# Patient Record
Sex: MALE | Race: WHITE | Employment: OTHER | URBAN - METROPOLITAN AREA
[De-identification: names, ages, dates, MRNs, and addresses within clinical notes are randomized per-mention and may not be internally consistent; named-entity substitution may affect disease eponyms.]

---

## 2017-01-09 RX ORDER — MELOXICAM 15 MG/1
TABLET ORAL
Qty: 90 TABLET | Refills: 0 | Status: SHIPPED | OUTPATIENT
Start: 2017-01-09 | End: 2017-04-06 | Stop reason: SDUPTHER

## 2017-02-24 ENCOUNTER — HOSPITAL ENCOUNTER (OUTPATIENT)
Dept: GENERAL RADIOLOGY | Age: 72
Discharge: HOME | End: 2017-02-24
Attending: NURSE PRACTITIONER | Admitting: NURSE PRACTITIONER

## 2017-02-24 ENCOUNTER — OFFICE VISIT (OUTPATIENT)
Dept: INTERNAL MEDICINE CLINIC | Age: 72
End: 2017-02-24

## 2017-02-24 VITALS
WEIGHT: 251 LBS | DIASTOLIC BLOOD PRESSURE: 68 MMHG | HEART RATE: 79 BPM | BODY MASS INDEX: 35.93 KG/M2 | SYSTOLIC BLOOD PRESSURE: 140 MMHG | OXYGEN SATURATION: 96 % | TEMPERATURE: 97.9 F | HEIGHT: 70 IN

## 2017-02-24 DIAGNOSIS — Z12.5 SCREENING FOR PROSTATE CANCER: ICD-10-CM

## 2017-02-24 DIAGNOSIS — Z00.00 ROUTINE GENERAL MEDICAL EXAMINATION AT A HEALTH CARE FACILITY: ICD-10-CM

## 2017-02-24 DIAGNOSIS — Z23 NEED FOR SHINGLES VACCINE: ICD-10-CM

## 2017-02-24 DIAGNOSIS — Z00.00 ROUTINE GENERAL MEDICAL EXAMINATION AT A HEALTH CARE FACILITY: Primary | ICD-10-CM

## 2017-02-24 DIAGNOSIS — N40.0 BENIGN PROSTATIC HYPERPLASIA, PRESENCE OF LOWER URINARY TRACT SYMPTOMS UNSPECIFIED, UNSPECIFIED MORPHOLOGY: ICD-10-CM

## 2017-02-24 DIAGNOSIS — Z11.59 NEED FOR HEPATITIS C SCREENING TEST: ICD-10-CM

## 2017-02-24 DIAGNOSIS — N52.9 ERECTILE DYSFUNCTION, UNSPECIFIED ERECTILE DYSFUNCTION TYPE: ICD-10-CM

## 2017-02-24 DIAGNOSIS — R51.9 NONINTRACTABLE HEADACHE, UNSPECIFIED CHRONICITY PATTERN, UNSPECIFIED HEADACHE TYPE: ICD-10-CM

## 2017-02-24 DIAGNOSIS — E78.2 MIXED HYPERLIPIDEMIA: ICD-10-CM

## 2017-02-24 DIAGNOSIS — I25.10 CORONARY ARTERY DISEASE INVOLVING NATIVE HEART, ANGINA PRESENCE UNSPECIFIED, UNSPECIFIED VESSEL OR LESION TYPE: ICD-10-CM

## 2017-02-24 DIAGNOSIS — I10 ESSENTIAL HYPERTENSION: ICD-10-CM

## 2017-02-24 LAB
A/G RATIO: 1.7 (ref 1.1–2.2)
ALBUMIN SERPL-MCNC: 4.3 G/DL (ref 3.4–5)
ALP BLD-CCNC: 42 U/L (ref 40–129)
ALT SERPL-CCNC: 37 U/L (ref 10–40)
ANION GAP SERPL CALCULATED.3IONS-SCNC: 15 MMOL/L (ref 3–16)
AST SERPL-CCNC: 27 U/L (ref 15–37)
BASOPHILS ABSOLUTE: 0 K/UL (ref 0–0.2)
BASOPHILS RELATIVE PERCENT: 0.8 %
BILIRUB SERPL-MCNC: 0.8 MG/DL (ref 0–1)
BUN BLDV-MCNC: 25 MG/DL (ref 7–20)
CALCIUM SERPL-MCNC: 9.2 MG/DL (ref 8.3–10.6)
CHLORIDE BLD-SCNC: 101 MMOL/L (ref 99–110)
CHOLESTEROL, TOTAL: 166 MG/DL (ref 0–199)
CO2: 25 MMOL/L (ref 21–32)
CREAT SERPL-MCNC: 1.2 MG/DL (ref 0.8–1.3)
EOSINOPHILS ABSOLUTE: 0.1 K/UL (ref 0–0.6)
EOSINOPHILS RELATIVE PERCENT: 2.4 %
GFR AFRICAN AMERICAN: >60
GFR NON-AFRICAN AMERICAN: 60
GLOBULIN: 2.6 G/DL
GLUCOSE BLD-MCNC: 88 MG/DL (ref 70–99)
HCT VFR BLD CALC: 46.5 % (ref 40.5–52.5)
HDLC SERPL-MCNC: 33 MG/DL (ref 40–60)
HEMOGLOBIN: 15.6 G/DL (ref 13.5–17.5)
LDL CHOLESTEROL CALCULATED: 97 MG/DL
LYMPHOCYTES ABSOLUTE: 2 K/UL (ref 1–5.1)
LYMPHOCYTES RELATIVE PERCENT: 37.3 %
MCH RBC QN AUTO: 31.5 PG (ref 26–34)
MCHC RBC AUTO-ENTMCNC: 33.6 G/DL (ref 31–36)
MCV RBC AUTO: 93.8 FL (ref 80–100)
MONOCYTES ABSOLUTE: 0.6 K/UL (ref 0–1.3)
MONOCYTES RELATIVE PERCENT: 10.8 %
NEUTROPHILS ABSOLUTE: 2.6 K/UL (ref 1.7–7.7)
NEUTROPHILS RELATIVE PERCENT: 48.7 %
PDW BLD-RTO: 12.3 % (ref 12.4–15.4)
PLATELET # BLD: 179 K/UL (ref 135–450)
PMV BLD AUTO: 8.3 FL (ref 5–10.5)
POTASSIUM SERPL-SCNC: 4.4 MMOL/L (ref 3.5–5.1)
PROSTATE SPECIFIC ANTIGEN: 1.02 NG/ML (ref 0–4)
RBC # BLD: 4.96 M/UL (ref 4.2–5.9)
SODIUM BLD-SCNC: 141 MMOL/L (ref 136–145)
TOTAL PROTEIN: 6.9 G/DL (ref 6.4–8.2)
TRIGL SERPL-MCNC: 181 MG/DL (ref 0–150)
VLDLC SERPL CALC-MCNC: 36 MG/DL
WBC # BLD: 5.4 K/UL (ref 4–11)

## 2017-02-24 PROCEDURE — 99397 PER PM REEVAL EST PAT 65+ YR: CPT | Performed by: NURSE PRACTITIONER

## 2017-02-24 RX ORDER — TADALAFIL 20 MG/1
20 TABLET ORAL PRN
Qty: 8 TABLET | Refills: 3 | Status: SHIPPED | OUTPATIENT
Start: 2017-02-24 | End: 2018-01-03 | Stop reason: CLARIF

## 2017-02-24 ASSESSMENT — ENCOUNTER SYMPTOMS
SORE THROAT: 0
FACIAL SWELLING: 0
COUGH: 0
SINUS PRESSURE: 0
NAUSEA: 0
DIARRHEA: 0
VOMITING: 0

## 2017-02-25 LAB — HEPATITIS C ANTIBODY INTERPRETATION: NORMAL

## 2017-04-06 RX ORDER — MELOXICAM 15 MG/1
TABLET ORAL
Qty: 90 TABLET | Refills: 0 | Status: SHIPPED | OUTPATIENT
Start: 2017-04-06 | End: 2017-10-03 | Stop reason: SDUPTHER

## 2017-04-06 RX ORDER — AMLODIPINE BESYLATE 5 MG/1
TABLET ORAL
Qty: 90 TABLET | Refills: 3 | Status: SHIPPED | OUTPATIENT
Start: 2017-04-06 | End: 2018-10-01 | Stop reason: CLARIF

## 2017-10-03 RX ORDER — MELOXICAM 15 MG/1
TABLET ORAL
Qty: 90 TABLET | Refills: 0 | Status: SHIPPED | OUTPATIENT
Start: 2017-10-03 | End: 2017-12-28 | Stop reason: SDUPTHER

## 2017-10-03 NOTE — TELEPHONE ENCOUNTER
Refill request for      -                 Meloxicam 15 MG            medication.      Name of Pharmacy-                     [facility]  # [id number]    Last visit - 02/24/17     Pending visit - 02/23/18    Last refill -04/06/17

## 2017-11-02 ENCOUNTER — HOSPITAL ENCOUNTER (OUTPATIENT)
Dept: OTHER | Age: 72
Discharge: HOME | End: 2017-11-02
Attending: RADIOLOGY | Admitting: RADIOLOGY

## 2017-11-02 DIAGNOSIS — S05.50XS: ICD-10-CM

## 2017-12-28 RX ORDER — MELOXICAM 15 MG/1
15 TABLET ORAL DAILY
Qty: 90 TABLET | Refills: 0 | Status: SHIPPED | OUTPATIENT
Start: 2017-12-28 | End: 2018-01-12 | Stop reason: SDUPTHER

## 2017-12-28 NOTE — TELEPHONE ENCOUNTER
Refill request for meloxicam medication.      Name of Pharmacy- [facility]    Last visit - 2/24/17     Pending visit - 1/3/18    Last refill -10/3/17  0 refills

## 2018-01-03 ENCOUNTER — OFFICE VISIT (OUTPATIENT)
Dept: INTERNAL MEDICINE CLINIC | Age: 73
End: 2018-01-03

## 2018-01-03 ENCOUNTER — HOSPITAL ENCOUNTER (OUTPATIENT)
Dept: GENERAL RADIOLOGY | Age: 73
Discharge: HOME | End: 2018-01-03
Attending: NURSE PRACTITIONER | Admitting: NURSE PRACTITIONER

## 2018-01-03 VITALS
SYSTOLIC BLOOD PRESSURE: 136 MMHG | BODY MASS INDEX: 35.98 KG/M2 | OXYGEN SATURATION: 98 % | DIASTOLIC BLOOD PRESSURE: 80 MMHG | WEIGHT: 257 LBS | HEIGHT: 71 IN | HEART RATE: 82 BPM

## 2018-01-03 DIAGNOSIS — I10 ESSENTIAL HYPERTENSION: ICD-10-CM
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
## 2018-01-03 DIAGNOSIS — I25.10 CORONARY ARTERY DISEASE INVOLVING NATIVE HEART, ANGINA PRESENCE UNSPECIFIED, UNSPECIFIED VESSEL OR LESION TYPE: ICD-10-CM

## 2018-01-03 DIAGNOSIS — Z01.818 PRE-OP EXAMINATION: ICD-10-CM

## 2018-01-03 DIAGNOSIS — H26.9 CATARACT OF RIGHT EYE, UNSPECIFIED CATARACT TYPE: Primary | ICD-10-CM

## 2018-01-03 DIAGNOSIS — E78.2 MIXED HYPERLIPIDEMIA: ICD-10-CM

## 2018-01-03 DIAGNOSIS — R35.1 NOCTURIA: ICD-10-CM

## 2018-01-03 LAB
A/G RATIO: 1.8 (ref 1.1–2.2)
ALBUMIN SERPL-MCNC: 4.4 G/DL (ref 3.4–5)
ALP BLD-CCNC: 41 U/L (ref 40–129)
ALT SERPL-CCNC: 39 U/L (ref 10–40)
ANION GAP SERPL CALCULATED.3IONS-SCNC: 15 MMOL/L (ref 3–16)
AST SERPL-CCNC: 26 U/L (ref 15–37)
BASOPHILS ABSOLUTE: 0 K/UL (ref 0–0.2)
BASOPHILS RELATIVE PERCENT: 0.5 %
BILIRUB SERPL-MCNC: 0.6 MG/DL (ref 0–1)
BUN BLDV-MCNC: 28 MG/DL (ref 7–20)
CALCIUM SERPL-MCNC: 9.1 MG/DL (ref 8.3–10.6)
CHLORIDE BLD-SCNC: 103 MMOL/L (ref 99–110)
CHOLESTEROL, TOTAL: 159 MG/DL (ref 0–199)
CO2: 26 MMOL/L (ref 21–32)
CREAT SERPL-MCNC: 1.2 MG/DL (ref 0.8–1.3)
EOSINOPHILS ABSOLUTE: 0.1 K/UL (ref 0–0.6)
EOSINOPHILS RELATIVE PERCENT: 2.1 %
GFR AFRICAN AMERICAN: >60
GFR NON-AFRICAN AMERICAN: 60
GLOBULIN: 2.5 G/DL
GLUCOSE BLD-MCNC: 105 MG/DL (ref 70–99)
HCT VFR BLD CALC: 45.9 % (ref 40.5–52.5)
HDLC SERPL-MCNC: 32 MG/DL (ref 40–60)
HEMOGLOBIN: 15.4 G/DL (ref 13.5–17.5)
LDL CHOLESTEROL CALCULATED: 94 MG/DL
LYMPHOCYTES ABSOLUTE: 1.6 K/UL (ref 1–5.1)
LYMPHOCYTES RELATIVE PERCENT: 24 %
MCH RBC QN AUTO: 31.7 PG (ref 26–34)
MCHC RBC AUTO-ENTMCNC: 33.6 G/DL (ref 31–36)
MCV RBC AUTO: 94.3 FL (ref 80–100)
MONOCYTES ABSOLUTE: 0.7 K/UL (ref 0–1.3)
MONOCYTES RELATIVE PERCENT: 10.7 %
NEUTROPHILS ABSOLUTE: 4.2 K/UL (ref 1.7–7.7)
NEUTROPHILS RELATIVE PERCENT: 62.7 %
PDW BLD-RTO: 12.4 % (ref 12.4–15.4)
PLATELET # BLD: 188 K/UL (ref 135–450)
PMV BLD AUTO: 8.4 FL (ref 5–10.5)
POTASSIUM SERPL-SCNC: 4.5 MMOL/L (ref 3.5–5.1)
PROSTATE SPECIFIC ANTIGEN: 1.1 NG/ML (ref 0–4)
RBC # BLD: 4.87 M/UL (ref 4.2–5.9)
SODIUM BLD-SCNC: 144 MMOL/L (ref 136–145)
TOTAL PROTEIN: 6.9 G/DL (ref 6.4–8.2)
TRIGL SERPL-MCNC: 165 MG/DL (ref 0–150)
VLDLC SERPL CALC-MCNC: 33 MG/DL
WBC # BLD: 6.8 K/UL (ref 4–11)

## 2018-01-03 PROCEDURE — 1123F ACP DISCUSS/DSCN MKR DOCD: CPT | Performed by: NURSE PRACTITIONER

## 2018-01-03 PROCEDURE — 4040F PNEUMOC VAC/ADMIN/RCVD: CPT | Performed by: NURSE PRACTITIONER

## 2018-01-03 PROCEDURE — G8427 DOCREV CUR MEDS BY ELIG CLIN: HCPCS | Performed by: NURSE PRACTITIONER

## 2018-01-03 PROCEDURE — 3017F COLORECTAL CA SCREEN DOC REV: CPT | Performed by: NURSE PRACTITIONER

## 2018-01-03 PROCEDURE — G8417 CALC BMI ABV UP PARAM F/U: HCPCS | Performed by: NURSE PRACTITIONER

## 2018-01-03 PROCEDURE — 99214 OFFICE O/P EST MOD 30 MIN: CPT | Performed by: NURSE PRACTITIONER

## 2018-01-03 PROCEDURE — G8598 ASA/ANTIPLAT THER USED: HCPCS | Performed by: NURSE PRACTITIONER

## 2018-01-03 PROCEDURE — 1036F TOBACCO NON-USER: CPT | Performed by: NURSE PRACTITIONER

## 2018-01-03 PROCEDURE — G8484 FLU IMMUNIZE NO ADMIN: HCPCS | Performed by: NURSE PRACTITIONER

## 2018-01-03 RX ORDER — DOXYCYCLINE HYCLATE 100 MG
100 TABLET ORAL 2 TIMES DAILY
Qty: 20 TABLET | Refills: 0 | Status: SHIPPED | OUTPATIENT
Start: 2018-01-03 | End: 2018-01-13

## 2018-01-03 NOTE — PROGRESS NOTES
Bleeding risk: No recent or remote history of abnormal bleeding  Personal or FH of DVT/PE: No    Patient objection to receiving blood products: No    Patient Active Problem List   Diagnosis    CAD (coronary artery disease)    Hypertension    Hyperlipidemia    Melanoma ([address]    S/P TKR (total knee replacement)       Past Medical History:   Diagnosis Date    CAD (coronary artery disease)     Hearing loss     right    Hyperlipidemia     Hypertension     Melanoma ([address]     S/P coronary artery stent placement 2004     Past Surgical History:   Procedure Laterality Date    COLONOSCOPY  01/2008    neg    COLONOSCOPY  2003    COLONOSCOPY  5/21/2014    diverticulosis-repeat 5 years    CYST REMOVAL Left 1976    foot    JOINT REPLACEMENT      MOHS SURGERY  2006    Melanoma neck    PTCA      2004    TONSILLECTOMY      TONSILLECTOMY AND ADENOIDECTOMY  1951    TOTAL KNEE ARTHROPLASTY Left 10/14    [facility]    WRIST GANGLION EXCISION Right 1979     Family History   Problem Relation Age of Onset    Heart Disease Mother 76    Heart Attack Mother     Heart Disease Father 55    Heart Attack Father     Cancer Brother 35     colon    Crohn's Disease Brother     Heart Disease Paternal Grandfather 39     Social History     Social History    Marital status:      Spouse name: N/A    Number of children: N/A    Years of education: N/A     Occupational History    Not on file. Social History Main Topics    Smoking status: Former Smoker     Packs/day: 1.00     Years: 15.00     Quit date: 1/1/1967    Smokeless tobacco: Never Used    Alcohol use No    Drug use: No    Sexual activity: Yes     Partners: Female     Other Topics Concern    Not on file     Social History Narrative    No narrative on file       Review of Systems  All other systems were reviewed and are negative. Physical Exam   Constitutional: He is oriented to person, place, and time.  He appears well-developed and well-nourished. No distress. Overweight. HENT:   Head: Normocephalic and atraumatic. Mouth/Throat: Uvula is midline, oropharynx is clear and moist and mucous membranes are normal. Crowns two upper front teeth. Eyes: Conjunctivae and EOM are normal. Pupils are equal, round, and reactive to light. Corrective lenses. Neck: Trachea normal and normal range of motion. Neck supple. No JVD present. Carotid bruit is not present. No mass and no thyromegaly present. Cardiovascular: Normal rate, regular rhythm, normal heart sounds and intact distal pulses. Exam reveals no gallop and no friction rub. No murmur heard. Pulmonary/Chest: Effort normal and breath sounds normal. No respiratory distress. He has no wheezes. He has no rales. Abdominal: Soft. Normal aorta and bowel sounds are normal. He exhibits no distension and no mass. There is no hepatosplenomegaly. No tenderness. Musculoskeletal: He exhibits no edema and no tenderness. Neurological: He is alert and oriented to person, place, and time. He has normal strength. No cranial nerve deficit or sensory deficit. Coordination and gait normal.   Skin: Skin is warm and dry. No rash noted. No erythema. Rt thumb with 1 cm laceration at base of thumb, mild surrounding erythema. No active drainage or warmth. Psychiatric: He has a normal mood and affect. His behavior is normal.     EKG Interpretation:  Cardiac clearance. Lab Review not applicable        Assessment:       81 y.o. patient with planned surgery as above. Known risk factors for perioperative complications: Coronary artery disease, Hypertension  Current medications which may produce withdrawal symptoms if withheld perioperatively: none     1. Cataract of right eye, unspecified cataract type  Cleared for surgery pending CC    2. Pre-op examination  Cleared for surgery pending CC    3. Essential hypertension  Monitor, check labs for upcoming appt  - Comprehensive Metabolic Panel;  Future  - CBC Auto Admission

## 2018-01-12 RX ORDER — MELOXICAM 15 MG/1
TABLET ORAL
Qty: 90 TABLET | Refills: 1 | Status: SHIPPED | OUTPATIENT
Start: 2018-01-12 | End: 2018-02-27 | Stop reason: SDUPTHER

## 2018-01-12 NOTE — TELEPHONE ENCOUNTER
Refill request for meloxicam medication.      Name of Pharmacy- [facility]    Last visit - 1-3-2018     Pending visit - 2-    Last refill -12-    Medication Contract signed -   Rizwan rosen-     Additional Comments

## 2018-02-23 ENCOUNTER — OFFICE VISIT (OUTPATIENT)
Dept: INTERNAL MEDICINE CLINIC | Age: 73
End: 2018-02-23

## 2018-02-23 VITALS
BODY MASS INDEX: 36.12 KG/M2 | HEART RATE: 74 BPM | WEIGHT: 258 LBS | SYSTOLIC BLOOD PRESSURE: 142 MMHG | DIASTOLIC BLOOD PRESSURE: 80 MMHG | HEIGHT: 71 IN | OXYGEN SATURATION: 96 %

## 2018-02-23 DIAGNOSIS — E78.2 MIXED HYPERLIPIDEMIA: ICD-10-CM

## 2018-02-23 DIAGNOSIS — Z13.6 SCREENING FOR AAA (ABDOMINAL AORTIC ANEURYSM): ICD-10-CM

## 2018-02-23 DIAGNOSIS — Z00.00 ROUTINE GENERAL MEDICAL EXAMINATION AT A HEALTH CARE FACILITY: Primary | ICD-10-CM

## 2018-02-23 DIAGNOSIS — R73.01 IFG (IMPAIRED FASTING GLUCOSE): ICD-10-CM

## 2018-02-23 DIAGNOSIS — M54.5 CHRONIC LOW BACK PAIN, UNSPECIFIED BACK PAIN LATERALITY, WITH SCIATICA PRESENCE UNSPECIFIED: ICD-10-CM

## 2018-02-23 DIAGNOSIS — R05.9 COUGH: ICD-10-CM

## 2018-02-23 DIAGNOSIS — H91.93 BILATERAL HEARING LOSS, UNSPECIFIED HEARING LOSS TYPE: ICD-10-CM

## 2018-02-23 DIAGNOSIS — K21.9 GASTROESOPHAGEAL REFLUX DISEASE, ESOPHAGITIS PRESENCE NOT SPECIFIED: ICD-10-CM

## 2018-02-23 DIAGNOSIS — I10 ESSENTIAL HYPERTENSION: ICD-10-CM

## 2018-02-23 DIAGNOSIS — G89.29 CHRONIC LOW BACK PAIN, UNSPECIFIED BACK PAIN LATERALITY, WITH SCIATICA PRESENCE UNSPECIFIED: ICD-10-CM

## 2018-02-23 DIAGNOSIS — Z77.29 EXPOSURE TO HAZARDOUS MATERIAL: ICD-10-CM

## 2018-02-23 PROCEDURE — G8598 ASA/ANTIPLAT THER USED: HCPCS | Performed by: NURSE PRACTITIONER

## 2018-02-23 PROCEDURE — 1036F TOBACCO NON-USER: CPT | Performed by: NURSE PRACTITIONER

## 2018-02-23 PROCEDURE — 4040F PNEUMOC VAC/ADMIN/RCVD: CPT | Performed by: NURSE PRACTITIONER

## 2018-02-23 PROCEDURE — G0402 INITIAL PREVENTIVE EXAM: HCPCS | Performed by: NURSE PRACTITIONER

## 2018-02-23 PROCEDURE — G8484 FLU IMMUNIZE NO ADMIN: HCPCS | Performed by: NURSE PRACTITIONER

## 2018-02-23 PROCEDURE — 1123F ACP DISCUSS/DSCN MKR DOCD: CPT | Performed by: NURSE PRACTITIONER

## 2018-02-23 PROCEDURE — 3017F COLORECTAL CA SCREEN DOC REV: CPT | Performed by: NURSE PRACTITIONER

## 2018-02-23 PROCEDURE — 99213 OFFICE O/P EST LOW 20 MIN: CPT | Performed by: NURSE PRACTITIONER

## 2018-02-23 PROCEDURE — G8417 CALC BMI ABV UP PARAM F/U: HCPCS | Performed by: NURSE PRACTITIONER

## 2018-02-23 PROCEDURE — G8427 DOCREV CUR MEDS BY ELIG CLIN: HCPCS | Performed by: NURSE PRACTITIONER

## 2018-02-23 ASSESSMENT — PATIENT HEALTH QUESTIONNAIRE - PHQ9: SUM OF ALL RESPONSES TO PHQ QUESTIONS 1-9: 0

## 2018-02-24 ENCOUNTER — HOSPITAL ENCOUNTER (OUTPATIENT)
Dept: OTHER | Age: 73
Discharge: HOME | End: 2018-02-24
Attending: NURSE PRACTITIONER | Admitting: NURSE PRACTITIONER

## 2018-02-24 DIAGNOSIS — R05.9 COUGH: ICD-10-CM

## 2018-02-24 DIAGNOSIS — Z77.29 EXPOSURE TO HAZARDOUS MATERIAL: ICD-10-CM

## 2018-02-27 RX ORDER — MELOXICAM 15 MG/1
TABLET ORAL
Qty: 90 TABLET | Refills: 1
Start: 2018-02-27 | End: 2018-07-13

## 2018-02-27 ASSESSMENT — ENCOUNTER SYMPTOMS
BACK PAIN: 1
SORE THROAT: 0
FACIAL SWELLING: 0
SINUS PRESSURE: 0
NAUSEA: 0
DIARRHEA: 0
VOMITING: 0
COUGH: 0

## 2018-02-27 NOTE — PROGRESS NOTES
Disease Father 55    Heart Attack Father     Cancer Brother 35     colon    Crohn's Disease Brother     Heart Disease Paternal Grandfather 39       Review of Systems   Constitutional: Negative for appetite change, chills, fatigue, fever and unexpected weight change. HENT: Positive for hearing loss. Negative for congestion, ear discharge, ear pain, facial swelling, sinus pressure, sneezing and sore throat. Respiratory: Negative for cough (in AM). Cardiovascular: Negative for chest pain. Gastrointestinal: Negative for diarrhea, nausea and vomiting. Genitourinary: Negative for difficulty urinating, dysuria, hematuria and urgency. Musculoskeletal: Positive for back pain. Negative for arthralgias and gait problem. Neurological: Negative for dizziness, weakness and headaches. Hematological: Negative for adenopathy. Psychiatric/Behavioral: Negative for sleep disturbance and suicidal ideas. Objective:   Physical Exam   Constitutional: He is oriented to person, place, and time. He appears well-developed and well-nourished. No distress. HENT:   Head: Normocephalic and atraumatic. Right Ear: External ear normal.   Left Ear: External ear normal.   Nose: Nose normal.   Mouth/Throat: Oropharynx is clear and moist.   Cardiovascular: Normal rate, regular rhythm, normal heart sounds and intact distal pulses. Pulmonary/Chest: Effort normal and breath sounds normal. He has no wheezes. He has no rales. Musculoskeletal: Normal range of motion. He exhibits no edema. Neurological: He is alert and oriented to person, place, and time. No cranial nerve deficit. Skin: He is not diaphoretic. Psychiatric: He has a normal mood and affect. His behavior is normal. Thought content normal.       Assessment:        2. Gastroesophageal reflux disease, esophagitis presence not specified  Encouraged to use Zantac 300mg OTC. Work on [employer]. 3. Essential hypertension  Monitor    4.  Mixed

## 2018-03-13 ENCOUNTER — HOSPITAL ENCOUNTER (OUTPATIENT)
Dept: ULTRASOUND IMAGING | Age: 73
Discharge: HOME | End: 2018-03-13
Attending: NURSE PRACTITIONER | Admitting: NURSE PRACTITIONER

## 2018-03-13 DIAGNOSIS — Z13.6 SCREENING FOR AAA (ABDOMINAL AORTIC ANEURYSM): ICD-10-CM

## 2018-03-13 DIAGNOSIS — Z13.6 ENCOUNTER FOR SCREENING FOR CARDIOVASCULAR DISORDERS: ICD-10-CM

## 2018-04-25 ENCOUNTER — HOSPITAL ENCOUNTER (OUTPATIENT)
Dept: OTHER | Age: 73
Discharge: HOME | End: 2018-04-25
Attending: NURSE PRACTITIONER | Admitting: NURSE PRACTITIONER

## 2018-04-25 ENCOUNTER — OFFICE VISIT (OUTPATIENT)
Dept: INTERNAL MEDICINE CLINIC | Age: 73
End: 2018-04-25

## 2018-04-25 ENCOUNTER — HOSPITAL ENCOUNTER (OUTPATIENT)
Dept: GENERAL RADIOLOGY | Age: 73
Discharge: HOME | End: 2018-04-25
Attending: NURSE PRACTITIONER | Admitting: NURSE PRACTITIONER

## 2018-04-25 VITALS
BODY MASS INDEX: 37.16 KG/M2 | HEART RATE: 89 BPM | DIASTOLIC BLOOD PRESSURE: 76 MMHG | WEIGHT: 259 LBS | SYSTOLIC BLOOD PRESSURE: 134 MMHG | OXYGEN SATURATION: 98 %

## 2018-04-25 DIAGNOSIS — G89.29 CHRONIC BILATERAL LOW BACK PAIN WITH BILATERAL SCIATICA: Primary | ICD-10-CM

## 2018-04-25 DIAGNOSIS — K21.9 GASTROESOPHAGEAL REFLUX DISEASE, ESOPHAGITIS PRESENCE NOT SPECIFIED: ICD-10-CM

## 2018-04-25 DIAGNOSIS — E78.2 MIXED HYPERLIPIDEMIA: ICD-10-CM

## 2018-04-25 DIAGNOSIS — Z01.812 PRE-OPERATIVE LABORATORY EXAMINATION: ICD-10-CM

## 2018-04-25 DIAGNOSIS — I25.10 CORONARY ARTERY DISEASE INVOLVING NATIVE HEART, ANGINA PRESENCE UNSPECIFIED, UNSPECIFIED VESSEL OR LESION TYPE: ICD-10-CM

## 2018-04-25 DIAGNOSIS — R73.01 IFG (IMPAIRED FASTING GLUCOSE): ICD-10-CM

## 2018-04-25 DIAGNOSIS — Z01.818 PREOP TESTING: ICD-10-CM

## 2018-04-25 DIAGNOSIS — I10 ESSENTIAL HYPERTENSION: ICD-10-CM

## 2018-04-25 DIAGNOSIS — M54.42 CHRONIC BILATERAL LOW BACK PAIN WITH BILATERAL SCIATICA: Primary | ICD-10-CM

## 2018-04-25 DIAGNOSIS — M54.41 CHRONIC BILATERAL LOW BACK PAIN WITH BILATERAL SCIATICA: Primary | ICD-10-CM

## 2018-04-25 LAB
ABO/RH: NORMAL
ANION GAP SERPL CALCULATED.3IONS-SCNC: 15 MMOL/L (ref 3–16)
ANTIBODY SCREEN: NORMAL
BASOPHILS ABSOLUTE: 0 K/UL (ref 0–0.2)
BASOPHILS RELATIVE PERCENT: 0.7 %
BUN BLDV-MCNC: 25 MG/DL (ref 7–20)
CALCIUM SERPL-MCNC: 9.2 MG/DL (ref 8.3–10.6)
CHLORIDE BLD-SCNC: 102 MMOL/L (ref 99–110)
CO2: 24 MMOL/L (ref 21–32)
CREAT SERPL-MCNC: 1.4 MG/DL (ref 0.8–1.3)
EOSINOPHILS ABSOLUTE: 0.1 K/UL (ref 0–0.6)
EOSINOPHILS RELATIVE PERCENT: 2 %
GFR AFRICAN AMERICAN: >60
GFR NON-AFRICAN AMERICAN: 50
GLUCOSE BLD-MCNC: 113 MG/DL (ref 70–99)
HCT VFR BLD CALC: 45.1 % (ref 40.5–52.5)
HEMOGLOBIN: 15.3 G/DL (ref 13.5–17.5)
LYMPHOCYTES ABSOLUTE: 1.9 K/UL (ref 1–5.1)
LYMPHOCYTES RELATIVE PERCENT: 33.7 %
MCH RBC QN AUTO: 32.1 PG (ref 26–34)
MCHC RBC AUTO-ENTMCNC: 33.9 G/DL (ref 31–36)
MCV RBC AUTO: 94.7 FL (ref 80–100)
MONOCYTES ABSOLUTE: 0.7 K/UL (ref 0–1.3)
MONOCYTES RELATIVE PERCENT: 12.3 %
NEUTROPHILS ABSOLUTE: 2.8 K/UL (ref 1.7–7.7)
NEUTROPHILS RELATIVE PERCENT: 51.3 %
PDW BLD-RTO: 12.7 % (ref 12.4–15.4)
PLATELET # BLD: 204 K/UL (ref 135–450)
PMV BLD AUTO: 8.4 FL (ref 5–10.5)
POTASSIUM SERPL-SCNC: 4.6 MMOL/L (ref 3.5–5.1)
RBC # BLD: 4.77 M/UL (ref 4.2–5.9)
SODIUM BLD-SCNC: 141 MMOL/L (ref 136–145)
WBC # BLD: 5.5 K/UL (ref 4–11)

## 2018-04-25 PROCEDURE — 3017F COLORECTAL CA SCREEN DOC REV: CPT | Performed by: NURSE PRACTITIONER

## 2018-04-25 PROCEDURE — 1123F ACP DISCUSS/DSCN MKR DOCD: CPT | Performed by: NURSE PRACTITIONER

## 2018-04-25 PROCEDURE — G8598 ASA/ANTIPLAT THER USED: HCPCS | Performed by: NURSE PRACTITIONER

## 2018-04-25 PROCEDURE — 4040F PNEUMOC VAC/ADMIN/RCVD: CPT | Performed by: NURSE PRACTITIONER

## 2018-04-25 PROCEDURE — 99214 OFFICE O/P EST MOD 30 MIN: CPT | Performed by: NURSE PRACTITIONER

## 2018-04-25 PROCEDURE — 1036F TOBACCO NON-USER: CPT | Performed by: NURSE PRACTITIONER

## 2018-04-25 PROCEDURE — 93000 ELECTROCARDIOGRAM COMPLETE: CPT | Performed by: NURSE PRACTITIONER

## 2018-04-25 PROCEDURE — G8427 DOCREV CUR MEDS BY ELIG CLIN: HCPCS | Performed by: NURSE PRACTITIONER

## 2018-04-25 PROCEDURE — G8417 CALC BMI ABV UP PARAM F/U: HCPCS | Performed by: NURSE PRACTITIONER

## 2018-04-27 PROBLEM — M51.369 LUMBAR DEGENERATIVE DISC DISEASE: Status: ACTIVE | Noted: 2018-04-27

## 2018-04-27 PROBLEM — M43.16 SPONDYLOLISTHESIS AT L4-L5 LEVEL: Status: ACTIVE | Noted: 2018-04-27

## 2018-04-27 PROBLEM — M51.16 LUMBAR DISC HERNIATION WITH RADICULOPATHY: Status: ACTIVE | Noted: 2018-04-27

## 2018-04-27 PROBLEM — M51.36 LUMBAR DEGENERATIVE DISC DISEASE: Status: ACTIVE | Noted: 2018-04-27

## 2018-04-27 PROBLEM — M43.16 SPONDYLOLISTHESIS, LUMBAR REGION: Status: ACTIVE | Noted: 2018-04-27

## 2018-04-27 PROBLEM — M48.062 LUMBAR STENOSIS WITH NEUROGENIC CLAUDICATION: Status: ACTIVE | Noted: 2018-04-27

## 2018-04-27 PROBLEM — E66.9 OBESITY (BMI 30-39.9): Status: ACTIVE | Noted: 2018-04-27

## 2018-04-27 PROBLEM — M54.16 LUMBAR RADICULOPATHY, ACUTE: Status: ACTIVE | Noted: 2018-04-27

## 2018-07-06 ENCOUNTER — OFFICE VISIT (OUTPATIENT)
Dept: INTERNAL MEDICINE CLINIC | Age: 73
End: 2018-07-06

## 2018-07-06 VITALS
SYSTOLIC BLOOD PRESSURE: 132 MMHG | OXYGEN SATURATION: 97 % | DIASTOLIC BLOOD PRESSURE: 78 MMHG | BODY MASS INDEX: 33.58 KG/M2 | HEART RATE: 107 BPM | WEIGHT: 234 LBS

## 2018-07-06 DIAGNOSIS — J02.9 PHARYNGITIS, UNSPECIFIED ETIOLOGY: ICD-10-CM

## 2018-07-06 DIAGNOSIS — M54.41 CHRONIC RIGHT-SIDED LOW BACK PAIN WITH RIGHT-SIDED SCIATICA: Primary | ICD-10-CM

## 2018-07-06 DIAGNOSIS — K21.9 GASTROESOPHAGEAL REFLUX DISEASE, ESOPHAGITIS PRESENCE NOT SPECIFIED: ICD-10-CM

## 2018-07-06 DIAGNOSIS — G89.29 CHRONIC RIGHT-SIDED LOW BACK PAIN WITH RIGHT-SIDED SCIATICA: Primary | ICD-10-CM

## 2018-07-06 PROCEDURE — G8427 DOCREV CUR MEDS BY ELIG CLIN: HCPCS | Performed by: NURSE PRACTITIONER

## 2018-07-06 PROCEDURE — 1123F ACP DISCUSS/DSCN MKR DOCD: CPT | Performed by: NURSE PRACTITIONER

## 2018-07-06 PROCEDURE — 3017F COLORECTAL CA SCREEN DOC REV: CPT | Performed by: NURSE PRACTITIONER

## 2018-07-06 PROCEDURE — 1036F TOBACCO NON-USER: CPT | Performed by: NURSE PRACTITIONER

## 2018-07-06 PROCEDURE — G8417 CALC BMI ABV UP PARAM F/U: HCPCS | Performed by: NURSE PRACTITIONER

## 2018-07-06 PROCEDURE — 4040F PNEUMOC VAC/ADMIN/RCVD: CPT | Performed by: NURSE PRACTITIONER

## 2018-07-06 PROCEDURE — G8598 ASA/ANTIPLAT THER USED: HCPCS | Performed by: NURSE PRACTITIONER

## 2018-07-06 PROCEDURE — 99213 OFFICE O/P EST LOW 20 MIN: CPT | Performed by: NURSE PRACTITIONER

## 2018-07-06 RX ORDER — CLOTRIMAZOLE 10 MG/1
10 LOZENGE ORAL; TOPICAL
COMMUNITY
Start: 2018-07-02 | End: 2018-07-06 | Stop reason: CLARIF

## 2018-07-06 NOTE — PROGRESS NOTES
Social History    Marital status:      Spouse name: N/A    Number of children: N/A    Years of education: N/A     Occupational History    Not on file. Social History Main Topics    Smoking status: Former Smoker     Packs/day: 1.00     Years: 15.00     Quit date: 1/1/1967    Smokeless tobacco: Never Used    Alcohol use No    Drug use: No    Sexual activity: Yes     Partners: Female     Other Topics Concern    Not on file     Social History Narrative    No narrative on file     Family History   Problem Relation Age of Onset    Heart Disease Mother 76    Heart Attack Mother     Heart Disease Father 55    Heart Attack Father     Cancer Brother 35        colon    Crohn's Disease Brother     Heart Disease Paternal Grandfather 45       Review of Systems   Constitutional: Negative for appetite change, chills, fatigue, fever and unexpected weight change. HENT: Positive for ear pain (left) and sore throat. Negative for congestion, ear discharge, facial swelling, hearing loss, sinus pressure and sneezing. Respiratory: Negative for cough. Cardiovascular: Negative for chest pain. Gastrointestinal: Negative for diarrhea, nausea and vomiting. Reflux   Genitourinary: Positive for frequency. Negative for difficulty urinating, dysuria, hematuria and urgency. Musculoskeletal: Negative for arthralgias and gait problem. Neurological: Negative for dizziness, weakness and headaches. Hematological: Negative for adenopathy. Psychiatric/Behavioral: Negative for sleep disturbance and suicidal ideas. Objective:   Physical Exam   Constitutional: He is oriented to person, place, and time. He appears well-developed and well-nourished. No distress. HENT:   Head: Normocephalic and atraumatic.    Right Ear: External ear normal.   Left Ear: External ear normal.   Nose: Nose normal.   Mouth/Throat: Oropharynx is clear and moist.   Eyes: Conjunctivae and EOM are normal. Pupils are equal,

## 2018-07-08 ASSESSMENT — ENCOUNTER SYMPTOMS
DIARRHEA: 0
SINUS PRESSURE: 0
SORE THROAT: 1
COUGH: 0
NAUSEA: 0
VOMITING: 0
FACIAL SWELLING: 0

## 2018-07-09 ENCOUNTER — HOSPITAL ENCOUNTER (OUTPATIENT)
Dept: MRI IMAGING | Age: 73
Discharge: HOME | End: 2018-07-09
Attending: ORTHOPAEDIC SURGERY | Admitting: ORTHOPAEDIC SURGERY

## 2018-07-09 ENCOUNTER — TELEPHONE (OUTPATIENT)
Dept: INTERNAL MEDICINE CLINIC | Age: 73
End: 2018-07-09

## 2018-07-09 DIAGNOSIS — M51.36 LUMBAR DEGENERATIVE DISC DISEASE: Primary | ICD-10-CM

## 2018-07-09 DIAGNOSIS — M48.062 SPINAL STENOSIS OF LUMBAR REGION WITH NEUROGENIC CLAUDICATION: ICD-10-CM

## 2018-07-09 NOTE — TELEPHONE ENCOUNTER
Patient saw Westley Hudson last Friday and he is asking if he can get a RX for a Handicap placard. Please call when ready for him to .   [phone]

## 2018-07-13 ENCOUNTER — TELEPHONE (OUTPATIENT)
Dept: INTERNAL MEDICINE CLINIC | Age: 73
End: 2018-07-13

## 2018-07-13 ENCOUNTER — OFFICE VISIT (OUTPATIENT)
Dept: INTERNAL MEDICINE CLINIC | Age: 73
End: 2018-07-13

## 2018-07-13 VITALS
BODY MASS INDEX: 33.15 KG/M2 | DIASTOLIC BLOOD PRESSURE: 68 MMHG | OXYGEN SATURATION: 97 % | TEMPERATURE: 95.7 F | WEIGHT: 231 LBS | SYSTOLIC BLOOD PRESSURE: 134 MMHG | HEART RATE: 100 BPM

## 2018-07-13 DIAGNOSIS — R60.0 LEG EDEMA, RIGHT: ICD-10-CM

## 2018-07-13 DIAGNOSIS — E78.2 MIXED HYPERLIPIDEMIA: ICD-10-CM

## 2018-07-13 DIAGNOSIS — Z01.818 PRE-OP EXAMINATION: ICD-10-CM

## 2018-07-13 DIAGNOSIS — K21.9 GASTROESOPHAGEAL REFLUX DISEASE, ESOPHAGITIS PRESENCE NOT SPECIFIED: ICD-10-CM

## 2018-07-13 DIAGNOSIS — G89.29 CHRONIC RIGHT-SIDED LOW BACK PAIN WITH RIGHT-SIDED SCIATICA: ICD-10-CM

## 2018-07-13 DIAGNOSIS — I10 ESSENTIAL HYPERTENSION: ICD-10-CM

## 2018-07-13 DIAGNOSIS — M79.604 RIGHT LEG PAIN: ICD-10-CM

## 2018-07-13 DIAGNOSIS — Z98.1 S/P LUMBAR FUSION: ICD-10-CM

## 2018-07-13 DIAGNOSIS — M51.36 LUMBAR DEGENERATIVE DISC DISEASE: Primary | ICD-10-CM

## 2018-07-13 DIAGNOSIS — M54.41 CHRONIC RIGHT-SIDED LOW BACK PAIN WITH RIGHT-SIDED SCIATICA: ICD-10-CM

## 2018-07-13 DIAGNOSIS — I25.10 CORONARY ARTERY DISEASE INVOLVING NATIVE HEART, ANGINA PRESENCE UNSPECIFIED, UNSPECIFIED VESSEL OR LESION TYPE: ICD-10-CM

## 2018-07-13 DIAGNOSIS — R73.01 IFG (IMPAIRED FASTING GLUCOSE): ICD-10-CM

## 2018-07-13 DIAGNOSIS — I82.411 ACUTE DEEP VEIN THROMBOSIS (DVT) OF FEMORAL VEIN OF RIGHT LOWER EXTREMITY (HCC): Primary | ICD-10-CM

## 2018-07-13 PROCEDURE — 99214 OFFICE O/P EST MOD 30 MIN: CPT | Performed by: NURSE PRACTITIONER

## 2018-07-13 PROCEDURE — 4040F PNEUMOC VAC/ADMIN/RCVD: CPT | Performed by: NURSE PRACTITIONER

## 2018-07-13 PROCEDURE — 3017F COLORECTAL CA SCREEN DOC REV: CPT | Performed by: NURSE PRACTITIONER

## 2018-07-13 PROCEDURE — G8598 ASA/ANTIPLAT THER USED: HCPCS | Performed by: NURSE PRACTITIONER

## 2018-07-13 PROCEDURE — 1036F TOBACCO NON-USER: CPT | Performed by: NURSE PRACTITIONER

## 2018-07-13 PROCEDURE — G8417 CALC BMI ABV UP PARAM F/U: HCPCS | Performed by: NURSE PRACTITIONER

## 2018-07-13 PROCEDURE — 1101F PT FALLS ASSESS-DOCD LE1/YR: CPT | Performed by: NURSE PRACTITIONER

## 2018-07-13 PROCEDURE — 1123F ACP DISCUSS/DSCN MKR DOCD: CPT | Performed by: NURSE PRACTITIONER

## 2018-07-13 PROCEDURE — G8427 DOCREV CUR MEDS BY ELIG CLIN: HCPCS | Performed by: NURSE PRACTITIONER

## 2018-07-13 NOTE — TELEPHONE ENCOUNTER
Please put beatriz into July 30th at 11:30AM -- 30 min. He is aware. Made pt aware to stop mobic and asa on eliquis.

## 2018-07-13 NOTE — PROGRESS NOTES
Preoperative Consultation      Denis Negrete  YOB: 1945    Date of Service:  7/13/2018    Vitals:    07/13/18 0731   BP: 134/68   Pulse: 100   Temp: 95.7 °F (35.4 °C)   SpO2: 97%   Weight: 231 lb (104.8 kg)      Wt Readings from Last 2 Encounters:   07/13/18 231 lb (104.8 kg)   07/06/18 234 lb (106.1 kg)     BP Readings from Last 3 Encounters:   07/13/18 134/68   07/06/18 132/78   04/29/18 (!) 143/84        Chief Complaint   Patient presents with   [employer] Pre-op Exam     Allergies   Allergen Reactions    Corticosteroids      Other reaction(s): unable to urinate    Cortisone Nausea Only    Other      STEROIDS    Prednisolone     Sulfa Antibiotics Rash and Other (See Comments)     Unknown, Rash possibly. Outpatient Prescriptions Marked as Taking for the 7/13/18 encounter (Office Visit) with ADIN Deleon CNP   Medication Sig Dispense Refill    aspirin 81 MG tablet Take 1 tablet by mouth daily 30 tablet     meloxicam (MOBIC) 15 MG tablet TAKE 1 TABLET EVERY DAY 90 tablet 1    amLODIPine (NORVASC) 5 MG tablet TAKE 1 TABLET BY MOUTH DAILY. 90 tablet 3    lisinopril (PRINIVIL;ZESTRIL) 20 MG tablet Take 1 tablet by mouth daily 90 tablet 4    ascorbic acid (VITAMIN C) 500 MG tablet Take 500 mg by mouth daily.  doxazosin (CARDURA) 8 MG tablet Take 8 mg by mouth nightly.  atorvastatin (LIPITOR) 20 MG tablet Take 20 mg by mouth daily.  fenofibrate (TRICOR) 145 MG tablet Take 145 mg by mouth daily. This patient presents to the office today for a preoperative consultation at the request of surgeon, Dr. Winsome Brooke, who plans on performing L3-4 and L5-S1 fusion on August 3 at [facility].  The current problem began 1 year ago, and symptoms have been worsening with time. Conservative therapy: Yes: PT, Nerve blocks, which has been ineffective. .    Planned anesthesia: General   Known anesthesia problems: None   Bleeding risk: No recent or remote history status: Former Smoker     Packs/day: 1.00     Years: 15.00     Quit date: 1/1/1967    Smokeless tobacco: Never Used    Alcohol use No    Drug use: No    Sexual activity: Yes     Partners: Female     Other Topics Concern    Not on file     Social History Narrative    No narrative on file       Review of Systems  All other systems were reviewed and are negative. Physical Exam   Constitutional: He is oriented to person, place, and time. He appears well-developed and well-nourished. No distress. HENT:   Head: Normocephalic and atraumatic. Mouth/Throat: Uvula is midline, oropharynx is clear and moist and mucous membranes are normal. Caps/crowns upper anterior. Eyes: Conjunctivae and EOM are normal. Pupils are equal, round, and reactive to light. Neck: Trachea normal and normal range of motion. Neck supple. No JVD present. Carotid bruit is not present. No mass and no thyromegaly present. Cardiovascular: Normal rate, regular rhythm, normal heart sounds and intact distal pulses. Exam reveals no gallop and no friction rub. No murmur heard. Pulmonary/Chest: Effort normal and breath sounds normal. No respiratory distress. He has no wheezes. He has no rales. Abdominal: Soft. Normal aorta and bowel sounds are normal. He exhibits no distension and no mass. There is no hepatosplenomegaly. No tenderness. Musculoskeletal: He exhibits no edema and no tenderness. Neurological: He is alert and oriented to person, place, and time. He has normal strength. No cranial nerve deficit or sensory deficit. Coordination and gait normal.   Skin: Skin is warm and dry. No rash noted. No erythema. Psychiatric: He has a normal mood and affect. His behavior is normal.     EKG Interpretation:  Cardiac clearance by Dr. Kira Carr provided (appt 7/19/18). EKG 4/25/18. Lab Review not applicable        Assessment:       72 y.o. patient with planned surgery as above.     Known risk factors for perioperative complications: Coronary artery disease, Hypertension  Current medications which may produce withdrawal symptoms if withheld perioperatively: none     1. Lumbar degenerative disc disease   SEVERE L45    Cleared pending venous doppler and cardiac clearance. 2. Chronic right-sided low back pain with right-sided sciatica  Cleared pending venous doppler and cardiac clearance    3. Pre-op examination  Cleared pending venous doppler and cardiac clearance    4. Leg edema, right  Order imaging  Elevate RLE     - US DOPPLER VENOUS LEG RIGHT; Future    5. Right leg pain  Order imaging  Elevate RLE  - US DOPPLER VENOUS LEG RIGHT; Future    6. Gastroesophageal reflux disease, esophagitis presence not specified  Monitor    7. Essential hypertension  Monitor    8. Mixed hyperlipidemia  Monitor    9. IFG (impaired fasting glucose)  Monitor    10. Coronary artery disease involving native heart, angina presence unspecified, unspecified vessel or lesion type  Monitor       Plan:     1. Preoperative workup as follows: Cardiac clearance  2. Change in medication regimen before surgery: Take Amlodipine on morning of surgery with sip of water, and hold all other medications until after surgery, Discontinue ASA 7 days before surgery, Discontinue NSAIDs (Mobic, ibuprofen, advil, aleve) 7 days before surgery, Ok to take Tylenol prior to surgery.   3. Prophylaxis for cardiac events with perioperative beta-blockers: Not indicated  ACC/AHA indications for pre-operative beta-blocker use:    · Vascular surgery with history of postitive stress test  · Intermediate or high risk surgery with history of CAD   · Intermediate or high risk surgery with multiple clinical predictors of CAD- 2 of the following: history of compensated or prior heart failure, history of cerebrovascular disease, DM, or renal insufficiency    Routine administration of higher-dose, long-acting metoprolol in beta-blockernaïve patients on the day of surgery, and in the absence of dose

## 2018-07-16 ENCOUNTER — TELEPHONE (OUTPATIENT)
Dept: INTERNAL MEDICINE CLINIC | Age: 73
End: 2018-07-16

## 2018-07-16 NOTE — TELEPHONE ENCOUNTER
Spoke with patient, he is going to pause PT for 1 week and then restart. Please call Dr Jose Bernard office and let them know of patient's DX DVT post-op Rt leg. I am not sure what they want to do regarding upcoming procedure 8/3/18.

## 2018-07-19 ENCOUNTER — TELEPHONE (OUTPATIENT)
Dept: INTERNAL MEDICINE CLINIC | Age: 73
End: 2018-07-19

## 2018-07-20 ENCOUNTER — TELEPHONE (OUTPATIENT)
Dept: INTERNAL MEDICINE CLINIC | Age: 73
End: 2018-07-20

## 2018-07-24 ENCOUNTER — TELEPHONE (OUTPATIENT)
Dept: INTERNAL MEDICINE CLINIC | Age: 73
End: 2018-07-24

## 2018-07-30 ENCOUNTER — OFFICE VISIT (OUTPATIENT)
Dept: INTERNAL MEDICINE CLINIC | Age: 73
End: 2018-07-30

## 2018-07-30 VITALS
BODY MASS INDEX: 32.86 KG/M2 | OXYGEN SATURATION: 97 % | DIASTOLIC BLOOD PRESSURE: 80 MMHG | WEIGHT: 229 LBS | HEART RATE: 114 BPM | SYSTOLIC BLOOD PRESSURE: 126 MMHG

## 2018-07-30 DIAGNOSIS — M51.36 LUMBAR DEGENERATIVE DISC DISEASE: ICD-10-CM

## 2018-07-30 DIAGNOSIS — G89.29 CHRONIC RIGHT-SIDED LOW BACK PAIN WITH RIGHT-SIDED SCIATICA: Primary | ICD-10-CM

## 2018-07-30 DIAGNOSIS — M54.41 CHRONIC RIGHT-SIDED LOW BACK PAIN WITH RIGHT-SIDED SCIATICA: Primary | ICD-10-CM

## 2018-07-30 DIAGNOSIS — I82.491 ACUTE DEEP VEIN THROMBOSIS (DVT) OF OTHER SPECIFIED VEIN OF RIGHT LOWER EXTREMITY (HCC): ICD-10-CM

## 2018-07-30 DIAGNOSIS — Z98.1 S/P LUMBAR FUSION: ICD-10-CM

## 2018-07-30 PROCEDURE — 1101F PT FALLS ASSESS-DOCD LE1/YR: CPT | Performed by: NURSE PRACTITIONER

## 2018-07-30 PROCEDURE — 4040F PNEUMOC VAC/ADMIN/RCVD: CPT | Performed by: NURSE PRACTITIONER

## 2018-07-30 PROCEDURE — G8427 DOCREV CUR MEDS BY ELIG CLIN: HCPCS | Performed by: NURSE PRACTITIONER

## 2018-07-30 PROCEDURE — G8417 CALC BMI ABV UP PARAM F/U: HCPCS | Performed by: NURSE PRACTITIONER

## 2018-07-30 PROCEDURE — 1036F TOBACCO NON-USER: CPT | Performed by: NURSE PRACTITIONER

## 2018-07-30 PROCEDURE — 99214 OFFICE O/P EST MOD 30 MIN: CPT | Performed by: NURSE PRACTITIONER

## 2018-07-30 PROCEDURE — 1123F ACP DISCUSS/DSCN MKR DOCD: CPT | Performed by: NURSE PRACTITIONER

## 2018-07-30 PROCEDURE — G8598 ASA/ANTIPLAT THER USED: HCPCS | Performed by: NURSE PRACTITIONER

## 2018-07-30 PROCEDURE — 3017F COLORECTAL CA SCREEN DOC REV: CPT | Performed by: NURSE PRACTITIONER

## 2018-07-30 ASSESSMENT — ENCOUNTER SYMPTOMS
DIARRHEA: 0
SORE THROAT: 0
BACK PAIN: 1
COUGH: 0
NAUSEA: 0
SINUS PRESSURE: 0
VOMITING: 0
FACIAL SWELLING: 0

## 2018-07-30 NOTE — PROGRESS NOTES
1. Chronic right-sided low back pain with right-sided sciatica  F/u for surgery tomorrow with Dr. Ronal Cruz    2. Lumbar degenerative disc disease  F/u for surgery tomorrow with Dr. Ronal Cruz    3. Acute deep vein thrombosis (DVT) of other specified vein of right lower extremity (HCC)  Monitor closely. Reviewed importance of movement after lumbar fusion tomorrow. Set alarm on phone for every hour. Continue Eliquis post op and f/u with me in 3 months  If any SOB or CP, go to ED    4. S/P lumbar fusion            Plan:      See above plan    Return in about 3 months (around 10/30/2018) for DVT f/u.

## 2018-08-07 DIAGNOSIS — I82.411 ACUTE DEEP VEIN THROMBOSIS (DVT) OF FEMORAL VEIN OF RIGHT LOWER EXTREMITY (HCC): ICD-10-CM

## 2018-08-07 RX ORDER — APIXABAN 5 MG/1
TABLET, FILM COATED ORAL
Qty: 74 TABLET | Refills: 0 | Status: SHIPPED | OUTPATIENT
Start: 2018-08-07 | End: 2018-09-14 | Stop reason: SDUPTHER

## 2018-08-10 ENCOUNTER — OFFICE VISIT (OUTPATIENT)
Dept: INTERNAL MEDICINE CLINIC | Age: 73
End: 2018-08-10

## 2018-08-10 ENCOUNTER — TELEPHONE (OUTPATIENT)
Dept: INTERNAL MEDICINE CLINIC | Age: 73
End: 2018-08-10

## 2018-08-10 ENCOUNTER — HOSPITAL ENCOUNTER (OUTPATIENT)
Age: 73
Discharge: HOME | End: 2018-08-10
Payer: MEDICARE

## 2018-08-10 DIAGNOSIS — I25.10 CORONARY ARTERY DISEASE INVOLVING NATIVE HEART, ANGINA PRESENCE UNSPECIFIED, UNSPECIFIED VESSEL OR LESION TYPE: ICD-10-CM

## 2018-08-10 DIAGNOSIS — R35.89 POLYURIA: ICD-10-CM

## 2018-08-10 DIAGNOSIS — R00.0 TACHYCARDIA: ICD-10-CM

## 2018-08-10 DIAGNOSIS — I95.9 HYPOTENSIVE EPISODE: ICD-10-CM

## 2018-08-10 DIAGNOSIS — R00.0 TACHYCARDIA: Primary | ICD-10-CM

## 2018-08-10 DIAGNOSIS — Z98.1 S/P LUMBAR FUSION: ICD-10-CM

## 2018-08-10 DIAGNOSIS — I82.411 ACUTE DEEP VEIN THROMBOSIS (DVT) OF FEMORAL VEIN OF RIGHT LOWER EXTREMITY (HCC): ICD-10-CM

## 2018-08-10 DIAGNOSIS — R73.9 HYPERGLYCEMIA: ICD-10-CM

## 2018-08-10 LAB
A/G RATIO: 1.6 (ref 1.1–2.2)
ALBUMIN SERPL-MCNC: 4.1 G/DL (ref 3.4–5)
ALP BLD-CCNC: 52 U/L (ref 40–129)
ALT SERPL-CCNC: 36 U/L (ref 10–40)
ANION GAP SERPL CALCULATED.3IONS-SCNC: 17 MMOL/L (ref 3–16)
AST SERPL-CCNC: 22 U/L (ref 15–37)
BACTERIA: ABNORMAL /HPF
BASOPHILS ABSOLUTE: 0.1 K/UL (ref 0–0.2)
BASOPHILS RELATIVE PERCENT: 0.9 %
BILIRUB SERPL-MCNC: 0.3 MG/DL (ref 0–1)
BUN BLDV-MCNC: 27 MG/DL (ref 7–20)
CALCIUM SERPL-MCNC: 9.7 MG/DL (ref 8.3–10.6)
CASTS 2: ABNORMAL /LPF
CASTS: ABNORMAL /LPF
CHLORIDE BLD-SCNC: 99 MMOL/L (ref 99–110)
CO2: 24 MMOL/L (ref 21–32)
CREAT SERPL-MCNC: 1.2 MG/DL (ref 0.8–1.3)
EOSINOPHILS ABSOLUTE: 0.1 K/UL (ref 0–0.6)
EOSINOPHILS RELATIVE PERCENT: 0.9 %
GFR AFRICAN AMERICAN: >60
GFR NON-AFRICAN AMERICAN: 60
GLOBULIN: 2.6 G/DL
GLUCOSE BLD-MCNC: 112 MG/DL (ref 70–99)
GLUCOSE BLD-MCNC: 132 MG/DL
HCT VFR BLD CALC: 34.9 % (ref 40.5–52.5)
HEMOGLOBIN: 11.6 G/DL (ref 13.5–17.5)
LYMPHOCYTES ABSOLUTE: 1.3 K/UL (ref 1–5.1)
LYMPHOCYTES RELATIVE PERCENT: 18.7 %
MCH RBC QN AUTO: 31.3 PG (ref 26–34)
MCHC RBC AUTO-ENTMCNC: 33.3 G/DL (ref 31–36)
MCV RBC AUTO: 93.8 FL (ref 80–100)
MONOCYTES ABSOLUTE: 0.8 K/UL (ref 0–1.3)
MONOCYTES RELATIVE PERCENT: 11.1 %
NEUTROPHILS ABSOLUTE: 4.9 K/UL (ref 1.7–7.7)
NEUTROPHILS RELATIVE PERCENT: 68.4 %
PDW BLD-RTO: 14.2 % (ref 12.4–15.4)
PLATELET # BLD: 262 K/UL (ref 135–450)
PMV BLD AUTO: 7.2 FL (ref 5–10.5)
POTASSIUM SERPL-SCNC: 4.6 MMOL/L (ref 3.5–5.1)
RBC # BLD: 3.72 M/UL (ref 4.2–5.9)
RBC UA: ABNORMAL /HPF (ref 0–2)
RENAL EPITHELIAL, UA: ABNORMAL /HPF
SODIUM BLD-SCNC: 140 MMOL/L (ref 136–145)
TOTAL PROTEIN: 6.7 G/DL (ref 6.4–8.2)
WBC # BLD: 7.1 K/UL (ref 4–11)
WBC UA: ABNORMAL /HPF (ref 0–5)

## 2018-08-10 PROCEDURE — 82962 GLUCOSE BLOOD TEST: CPT | Performed by: NURSE PRACTITIONER

## 2018-08-10 PROCEDURE — 4040F PNEUMOC VAC/ADMIN/RCVD: CPT | Performed by: NURSE PRACTITIONER

## 2018-08-10 PROCEDURE — 83935 ASSAY OF URINE OSMOLALITY: CPT

## 2018-08-10 PROCEDURE — G8598 ASA/ANTIPLAT THER USED: HCPCS | Performed by: NURSE PRACTITIONER

## 2018-08-10 PROCEDURE — 93000 ELECTROCARDIOGRAM COMPLETE: CPT | Performed by: NURSE PRACTITIONER

## 2018-08-10 PROCEDURE — 1036F TOBACCO NON-USER: CPT | Performed by: NURSE PRACTITIONER

## 2018-08-10 PROCEDURE — 81015 MICROSCOPIC EXAM OF URINE: CPT

## 2018-08-10 PROCEDURE — 1101F PT FALLS ASSESS-DOCD LE1/YR: CPT | Performed by: NURSE PRACTITIONER

## 2018-08-10 PROCEDURE — 80053 COMPREHEN METABOLIC PANEL: CPT

## 2018-08-10 PROCEDURE — G8417 CALC BMI ABV UP PARAM F/U: HCPCS | Performed by: NURSE PRACTITIONER

## 2018-08-10 PROCEDURE — 1123F ACP DISCUSS/DSCN MKR DOCD: CPT | Performed by: NURSE PRACTITIONER

## 2018-08-10 PROCEDURE — 3017F COLORECTAL CA SCREEN DOC REV: CPT | Performed by: NURSE PRACTITIONER

## 2018-08-10 PROCEDURE — 36415 COLL VENOUS BLD VENIPUNCTURE: CPT

## 2018-08-10 PROCEDURE — 82088 ASSAY OF ALDOSTERONE: CPT

## 2018-08-10 PROCEDURE — 85025 COMPLETE CBC W/AUTO DIFF WBC: CPT

## 2018-08-10 PROCEDURE — 99215 OFFICE O/P EST HI 40 MIN: CPT | Performed by: NURSE PRACTITIONER

## 2018-08-10 PROCEDURE — G8427 DOCREV CUR MEDS BY ELIG CLIN: HCPCS | Performed by: NURSE PRACTITIONER

## 2018-08-10 NOTE — TELEPHONE ENCOUNTER
Please make Dr Pravin Bashir aware that I saw patient post-fusion and he is hypotensive, tachycardic, polyuria, elevated glucose. I have stopped his Amlodipine and Cardura until I can receive labs back and urinalysis back. (Pt also had IVC filter placed last week and will not have removed for 3 months). Please fax EKG and see if Pravin Bashir would like to me do anything else at this time.

## 2018-08-10 NOTE — PROGRESS NOTES
Subjective:      Patient ID: Derek Rios is a 72 y.o. male. HPI  Chief Complaint   Patient presents with    Urinary Tract Infection     frequency      Patient is here today s/p lumbar fusion revision 7/31/18 by Dr. Elpidio Alfaro. He also was dx DVT, RLE, 7/13/18. Had been on Eliquis until an IVC filter was placed prior to surgery 7/31/18. He still has IVC filter placed. His leg pain has improved since surgery but pt c/o new symptoms. Polyuria every hour x 1-2 months. He has also c/o hypotensive episodes since procedure 7/31/18. Noticed BPs running 100/60s. Feels lightheaded, SOB and fatigued at this time. Then he will rest and symptoms improve. Discontinued Amlodipine 3 days ago. Yesterday when he went to IR consultation regarding filter, he had 105/78 and felt very fatigued again. He states he is only taking tylenol for pain and has not been on opiate. Prior to Visit Medications    Medication Sig Taking? Authorizing Provider   ELIQUIS 5 MG TABS tablet 2 PILLS TWICE A DAY X 1 WEEK 1 PILL TWICE A DAY X 3 WEEKS Yes ADIN Shukla   lisinopril (PRINIVIL;ZESTRIL) 20 MG tablet Take 1 tablet by mouth daily Yes Karyle Garfinkel, MD   ascorbic acid (VITAMIN C) 500 MG tablet Take 500 mg by mouth daily. Yes Historical Provider, MD   doxazosin (CARDURA) 8 MG tablet Take 8 mg by mouth nightly. Yes Historical Provider, MD   atorvastatin (LIPITOR) 20 MG tablet Take 20 mg by mouth daily. Yes Historical Provider, MD   fenofibrate (TRICOR) 145 MG tablet Take 145 mg by mouth daily. Yes Historical Provider, MD   amLODIPine (NORVASC) 5 MG tablet TAKE 1 TABLET BY MOUTH DAILY. ADIN Severino - CNP     Social History     Social History    Marital status:      Spouse name: N/A    Number of children: N/A    Years of education: N/A     Occupational History    Not on file.      Social History Main Topics    Smoking status: Former Smoker     Packs/day: 1.00     Years: 15.00     Quit date: 1/1/1967    Smokeless tobacco: Never Used    Alcohol use No    Drug use: No    Sexual activity: Yes     Partners: Female     Other Topics Concern    Not on file     Social History Narrative    No narrative on file     Family History   Problem Relation Age of Onset    Heart Disease Mother 76    Heart Attack Mother     Heart Disease Father 55    Heart Attack Father     Cancer Brother 35        colon    Crohn's Disease Brother     Heart Disease Paternal Grandfather 39       Review of Systems   Constitutional: Positive for activity change and fatigue. Negative for appetite change, chills, fever and unexpected weight change. HENT: Negative for congestion, ear discharge, ear pain, facial swelling, hearing loss, sinus pressure, sneezing and sore throat. Respiratory: Positive for shortness of breath (on exertion). Negative for cough. Cardiovascular: Positive for leg swelling (RLE improving). Negative for chest pain. Gastrointestinal: Negative for diarrhea, nausea and vomiting. Genitourinary: Positive for frequency. Negative for difficulty urinating, dysuria, hematuria and urgency. Musculoskeletal: Positive for back pain (soreness s/p lumbar fusion revision). Negative for arthralgias and gait problem. Neurological: Negative for dizziness, weakness and headaches. Hematological: Negative for adenopathy. Psychiatric/Behavioral: Negative for sleep disturbance and suicidal ideas. Objective:   Physical Exam   Constitutional: He is oriented to person, place, and time. He appears well-developed and well-nourished. No distress. HENT:   Head: Normocephalic and atraumatic. Right Ear: External ear normal.   Left Ear: External ear normal.   Nose: Nose normal.   Mouth/Throat: Oropharynx is clear and moist.   Eyes: Pupils are equal, round, and reactive to light. Conjunctivae and EOM are normal.   Neck: Normal range of motion. Neck supple.    Cardiovascular: Normal rate, regular rhythm, normal heart sounds and intact distal pulses. Pulmonary/Chest: Effort normal and breath sounds normal. He has no wheezes. He has no rales. Abdominal: Soft. Bowel sounds are normal.   Musculoskeletal:   Lumbar brace. Lymphadenopathy:     He has no cervical adenopathy. Neurological: He is alert and oriented to person, place, and time. No cranial nerve deficit. Skin: He is not diaphoretic. Assessment:      1. Tachycardia  EKG reviewed with Dr Deidra Cobb. No acute changes except for tachycardia. Check labs  Educated pt on monitoring pulse at home. If any irregular rhythm or increase HR > 140, go to ED    - OSMOLALITY, URINE; Future  - Comprehensive Metabolic Panel; Future  - EKG 12 Lead    2. Coronary artery disease involving native heart, angina presence unspecified, unspecified vessel or lesion type  Monitor, EKG tachycardia    - EKG 12 Lead    3. Polyuria  Check labs. Reviewed case with Dr Deidra Cobb and will monitor causes of hyperuresis. Enc pt to monitor hydration and drink > 80 oz water/ day. - POCT Urinalysis no Micro  - OSMOLALITY, URINE; Future  - CBC Auto Differential; Future  - Comprehensive Metabolic Panel; Future  - POCT Glucose  - MICROSCOPIC URINALYSIS; Future    4. Hypotensive episode  Monitor BP at home. Stop Cardura and continue no Amlodipine    - ALDOSTERONE; Future  - CBC Auto Differential; Future  - Comprehensive Metabolic Panel; Future    5. Hyperglycemia  Check A1c.     - Hemoglobin A1C; Future    6. S/P lumbar fusion    7. Acute deep vein thrombosis (DVT) of femoral vein of right lower extremity (HCC)  Continue IVC filter placement and review with Dr Key Pryor when removal will occur          Plan:      See above plan    Total visit time was 50 minutes, of which more than 50% of the time was spent discussing and counseling patient on hypotension, tachycardia, DVT and polyuria. Return if symptoms worsen or fail to improve.             Olman Laureano, APRN - CNP

## 2018-08-11 LAB — OSMOLALITY URINE: 426 MOSM/KG (ref 390–1070)

## 2018-08-12 VITALS
SYSTOLIC BLOOD PRESSURE: 84 MMHG | BODY MASS INDEX: 33.15 KG/M2 | WEIGHT: 231 LBS | OXYGEN SATURATION: 98 % | DIASTOLIC BLOOD PRESSURE: 42 MMHG | HEART RATE: 120 BPM

## 2018-08-12 LAB — ALDOSTERONE: 16.5 NG/DL

## 2018-08-12 ASSESSMENT — ENCOUNTER SYMPTOMS
BACK PAIN: 1
FACIAL SWELLING: 0
VOMITING: 0
SINUS PRESSURE: 0
SHORTNESS OF BREATH: 1
COUGH: 0
DIARRHEA: 0
SORE THROAT: 0
NAUSEA: 0

## 2018-08-15 DIAGNOSIS — I95.9 HYPOTENSIVE EPISODE: Primary | ICD-10-CM

## 2018-08-17 ENCOUNTER — HOSPITAL ENCOUNTER (OUTPATIENT)
Age: 73
Discharge: HOME | End: 2018-08-17
Payer: MEDICARE

## 2018-08-17 ENCOUNTER — TELEPHONE (OUTPATIENT)
Dept: INTERNAL MEDICINE CLINIC | Age: 73
End: 2018-08-17

## 2018-08-17 DIAGNOSIS — R35.0 FREQUENCY OF URINATION: Primary | ICD-10-CM

## 2018-08-17 DIAGNOSIS — R73.9 HYPERGLYCEMIA: ICD-10-CM

## 2018-08-17 DIAGNOSIS — I95.9 HYPOTENSIVE EPISODE: ICD-10-CM

## 2018-08-17 LAB
AMORPHOUS: ABNORMAL /HPF
BACTERIA: ABNORMAL /HPF
BASOPHILS ABSOLUTE: 0 K/UL (ref 0–0.2)
BASOPHILS RELATIVE PERCENT: 0.5 %
BILIRUBIN URINE: NEGATIVE
BLOOD, URINE: ABNORMAL
CLARITY: ABNORMAL
COLOR: YELLOW
EOSINOPHILS ABSOLUTE: 0.1 K/UL (ref 0–0.6)
EOSINOPHILS RELATIVE PERCENT: 1.3 %
GLUCOSE URINE: NEGATIVE MG/DL
HCT VFR BLD CALC: 38.5 % (ref 40.5–52.5)
HEMOGLOBIN: 12.4 G/DL (ref 13.5–17.5)
KETONES, URINE: NEGATIVE MG/DL
LEUKOCYTE ESTERASE, URINE: ABNORMAL
LYMPHOCYTES ABSOLUTE: 2.1 K/UL (ref 1–5.1)
LYMPHOCYTES RELATIVE PERCENT: 32.8 %
MCH RBC QN AUTO: 30.6 PG (ref 26–34)
MCHC RBC AUTO-ENTMCNC: 32.3 G/DL (ref 31–36)
MCV RBC AUTO: 94.6 FL (ref 80–100)
MICROSCOPIC EXAMINATION: YES
MONOCYTES ABSOLUTE: 0.7 K/UL (ref 0–1.3)
MONOCYTES RELATIVE PERCENT: 10.7 %
NEUTROPHILS ABSOLUTE: 3.6 K/UL (ref 1.7–7.7)
NEUTROPHILS RELATIVE PERCENT: 54.7 %
NITRITE, URINE: POSITIVE
PDW BLD-RTO: 14.6 % (ref 12.4–15.4)
PH UA: 6
PLATELET # BLD: 291 K/UL (ref 135–450)
PMV BLD AUTO: 7.2 FL (ref 5–10.5)
PROTEIN UA: NEGATIVE MG/DL
RBC # BLD: 4.06 M/UL (ref 4.2–5.9)
RBC UA: ABNORMAL /HPF (ref 0–2)
SPECIFIC GRAVITY UA: 1.01
URINE TYPE: ABNORMAL
UROBILINOGEN, URINE: 0.2 E.U./DL
WBC # BLD: 6.5 K/UL (ref 4–11)
WBC UA: ABNORMAL /HPF (ref 0–5)

## 2018-08-17 PROCEDURE — 87077 CULTURE AEROBIC IDENTIFY: CPT

## 2018-08-17 PROCEDURE — 87186 SC STD MICRODIL/AGAR DIL: CPT

## 2018-08-17 PROCEDURE — 83036 HEMOGLOBIN GLYCOSYLATED A1C: CPT

## 2018-08-17 PROCEDURE — 87086 URINE CULTURE/COLONY COUNT: CPT

## 2018-08-17 PROCEDURE — 81001 URINALYSIS AUTO W/SCOPE: CPT

## 2018-08-17 PROCEDURE — 85025 COMPLETE CBC W/AUTO DIFF WBC: CPT

## 2018-08-17 PROCEDURE — 36415 COLL VENOUS BLD VENIPUNCTURE: CPT

## 2018-08-17 RX ORDER — CIPROFLOXACIN 500 MG/1
500 TABLET, FILM COATED ORAL 2 TIMES DAILY
Qty: 10 TABLET | Refills: 0 | Status: SHIPPED | OUTPATIENT
Start: 2018-08-17 | End: 2018-08-22

## 2018-08-18 LAB
ESTIMATED AVERAGE GLUCOSE: 105.4 MG/DL
HBA1C MFR BLD: 5.3 %

## 2018-08-19 LAB
ORGANISM: ABNORMAL
URINE CULTURE, ROUTINE: ABNORMAL
URINE CULTURE, ROUTINE: ABNORMAL

## 2018-08-20 ENCOUNTER — TELEPHONE (OUTPATIENT)
Dept: INTERNAL MEDICINE CLINIC | Age: 73
End: 2018-08-20

## 2018-09-14 DIAGNOSIS — I82.411 ACUTE DEEP VEIN THROMBOSIS (DVT) OF FEMORAL VEIN OF RIGHT LOWER EXTREMITY (HCC): ICD-10-CM

## 2018-09-14 NOTE — TELEPHONE ENCOUNTER
Refill request for  Eliquis  medication.      Name of Pharmacy- [facility]    Last visit - 08/10/18     Pending visit - 09/21/18    Last refill -08/07/18

## 2018-09-17 DIAGNOSIS — I82.411 ACUTE DEEP VEIN THROMBOSIS (DVT) OF FEMORAL VEIN OF RIGHT LOWER EXTREMITY (HCC): ICD-10-CM

## 2018-09-21 ENCOUNTER — OFFICE VISIT (OUTPATIENT)
Dept: INTERNAL MEDICINE CLINIC | Age: 73
End: 2018-09-21

## 2018-09-21 ENCOUNTER — TELEPHONE (OUTPATIENT)
Dept: INTERNAL MEDICINE CLINIC | Age: 73
End: 2018-09-21

## 2018-09-21 VITALS
WEIGHT: 243 LBS | DIASTOLIC BLOOD PRESSURE: 72 MMHG | HEART RATE: 108 BPM | BODY MASS INDEX: 34.87 KG/M2 | SYSTOLIC BLOOD PRESSURE: 138 MMHG | OXYGEN SATURATION: 98 %

## 2018-09-21 DIAGNOSIS — I82.411 ACUTE DEEP VEIN THROMBOSIS (DVT) OF FEMORAL VEIN OF RIGHT LOWER EXTREMITY (HCC): ICD-10-CM

## 2018-09-21 DIAGNOSIS — R60.0 LEG EDEMA, RIGHT: Primary | ICD-10-CM

## 2018-09-21 DIAGNOSIS — L71.9 ROSACEA: ICD-10-CM

## 2018-09-21 PROCEDURE — G8427 DOCREV CUR MEDS BY ELIG CLIN: HCPCS | Performed by: NURSE PRACTITIONER

## 2018-09-21 PROCEDURE — 4040F PNEUMOC VAC/ADMIN/RCVD: CPT | Performed by: NURSE PRACTITIONER

## 2018-09-21 PROCEDURE — 99213 OFFICE O/P EST LOW 20 MIN: CPT | Performed by: NURSE PRACTITIONER

## 2018-09-21 PROCEDURE — 3017F COLORECTAL CA SCREEN DOC REV: CPT | Performed by: NURSE PRACTITIONER

## 2018-09-21 PROCEDURE — 1123F ACP DISCUSS/DSCN MKR DOCD: CPT | Performed by: NURSE PRACTITIONER

## 2018-09-21 PROCEDURE — 1036F TOBACCO NON-USER: CPT | Performed by: NURSE PRACTITIONER

## 2018-09-21 PROCEDURE — 1101F PT FALLS ASSESS-DOCD LE1/YR: CPT | Performed by: NURSE PRACTITIONER

## 2018-09-21 PROCEDURE — G8598 ASA/ANTIPLAT THER USED: HCPCS | Performed by: NURSE PRACTITIONER

## 2018-09-21 PROCEDURE — G8417 CALC BMI ABV UP PARAM F/U: HCPCS | Performed by: NURSE PRACTITIONER

## 2018-09-21 NOTE — PROGRESS NOTES
Subjective:      Patient ID: Nara Vang is a 72 y.o. male. HPI  Chief Complaint   Patient presents with    Foot Swelling     Right foot / swelling off an on.  Rash     Rt ankle severely swelling. Does not decrease overnight. Worsens at night. Knot appeared 2 weeks ago and noticed diffuse swelling and now knot resolved but swelling continues. Rash/redness over nose and cheeks. X 1 month. No itching. No new creams. Hx acute DVT Rt lower leg. Post op revision laminectomy - 7/2018. Denies CP. Denies SOB. Prior to Visit Medications    Medication Sig Taking? Authorizing Provider   vitamin D (CHOLECALCIFEROL) 1000 UNIT TABS tablet Take 1,000 Units by mouth daily Yes Historical Provider, MD   Calcium Citrate 200 MG TABS Take 800 mg by mouth daily Yes Historical Provider, MD   lisinopril (PRINIVIL;ZESTRIL) 20 MG tablet Take 1 tablet by mouth daily Yes Sridevi Pinon MD   ascorbic acid (VITAMIN C) 500 MG tablet Take 500 mg by mouth daily. Yes Historical Provider, MD   doxazosin (CARDURA) 8 MG tablet Take 4 mg by mouth nightly  Yes Historical Provider, MD   atorvastatin (LIPITOR) 20 MG tablet Take 20 mg by mouth daily. Yes Historical Provider, MD   fenofibrate (TRICOR) 145 MG tablet Take 145 mg by mouth daily. Yes Historical Provider, MD   apixaban (ELIQUIS) 5 MG TABS tablet 1 pill BID  ADIN Fernandez CNP   amLODIPine (NORVASC) 5 MG tablet TAKE 1 TABLET BY MOUTH DAILY. ADIN Ross CNP     Social History     Social History    Marital status:      Spouse name: N/A    Number of children: N/A    Years of education: N/A     Occupational History    Not on file.      Social History Main Topics    Smoking status: Former Smoker     Packs/day: 1.00     Years: 15.00     Quit date: 1/1/1967    Smokeless tobacco: Never Used    Alcohol use No    Drug use: No    Sexual activity: Yes     Partners: Female     Other Topics Concern    Not on file     Social History Narrative    No narrative on file     Family History   Problem Relation Age of Onset    Heart Disease Mother 76    Heart Attack Mother     Heart Disease Father 55    Heart Attack Father     Cancer Brother 35        colon    Crohn's Disease Brother     Heart Disease Paternal Grandfather 39           Review of Systems   Constitutional: Negative for appetite change, chills, fatigue, fever and unexpected weight change. HENT: Negative for congestion, ear discharge, ear pain, facial swelling, hearing loss, sinus pressure, sneezing and sore throat. Respiratory: Negative for cough. Cardiovascular: Negative for chest pain. Gastrointestinal: Negative for diarrhea, nausea and vomiting. Genitourinary: Negative for difficulty urinating, dysuria, hematuria and urgency. Musculoskeletal: Positive for joint swelling. Negative for arthralgias and gait problem. Neurological: Negative for dizziness, weakness and headaches. Hematological: Negative for adenopathy. Psychiatric/Behavioral: Negative for sleep disturbance and suicidal ideas. Objective:   Physical Exam   Constitutional: He is oriented to person, place, and time. He appears well-developed and well-nourished. No distress. HENT:   Head: Normocephalic and atraumatic. Cardiovascular: Normal rate, regular rhythm, normal heart sounds and intact distal pulses. Pulmonary/Chest: Effort normal and breath sounds normal. He has no wheezes. He has no rales. Musculoskeletal:   RLE with 1+ pitting edema below knee. Pulse 2+. FROM. No warmth. No redness. Neurological: He is alert and oriented to person, place, and time. No cranial nerve deficit. Skin: He is not diaphoretic. Erythematous lesions noted over B cheeks and bridge of nose. No excoriation. No patches   Psychiatric: He has a normal mood and affect. His behavior is normal. Thought content normal.       Assessment:      1. Leg edema, right    - US DOPPLER VENOUS LEG RIGHT; Future    2.  Acute deep vein thrombosis (DVT) of femoral vein of right lower extremity (HCC)    - US DOPPLER VENOUS LEG RIGHT; Future    Rosacea  Start on topical agent        Plan:      Patient has not been on Eliquis since surgical procedure and continues to have IVC filter in place. Per pt Dr Myles Moody does not want pt on Eliquis post-op. Will call IR and confirm no contraindication of Eliquis with IVC filter and will restart. Order STAT venous doppler. Enc to elevate LE and monitor. If any SOB or CP, go to ED. Closely monitor patient and will set up venous doppler for tomorrow morning. Return if symptoms worsen or fail to improve.           Lon Sandhoff, APRN - CNP

## 2018-09-22 ENCOUNTER — HOSPITAL ENCOUNTER (OUTPATIENT)
Dept: VASCULAR LAB | Age: 73
Discharge: HOME | End: 2018-09-22
Payer: MEDICARE

## 2018-09-22 DIAGNOSIS — I82.411 ACUTE DEEP VEIN THROMBOSIS (DVT) OF FEMORAL VEIN OF RIGHT LOWER EXTREMITY (HCC): ICD-10-CM

## 2018-09-22 DIAGNOSIS — R60.0 LEG EDEMA, RIGHT: ICD-10-CM

## 2018-09-22 PROCEDURE — 93971 EXTREMITY STUDY: CPT

## 2018-09-29 RX ORDER — METRONIDAZOLE 7.5 MG/G
GEL TOPICAL
Qty: 45 G | Refills: 0 | Status: SHIPPED | OUTPATIENT
Start: 2018-09-29 | End: 2021-09-27 | Stop reason: SDUPTHER

## 2018-09-29 ASSESSMENT — ENCOUNTER SYMPTOMS
NAUSEA: 0
DIARRHEA: 0
SORE THROAT: 0
VOMITING: 0
COUGH: 0
SINUS PRESSURE: 0
FACIAL SWELLING: 0

## 2018-10-01 ENCOUNTER — OFFICE VISIT (OUTPATIENT)
Dept: INTERNAL MEDICINE CLINIC | Age: 73
End: 2018-10-01
Payer: MEDICARE

## 2018-10-01 VITALS
SYSTOLIC BLOOD PRESSURE: 134 MMHG | HEART RATE: 72 BPM | BODY MASS INDEX: 35.58 KG/M2 | DIASTOLIC BLOOD PRESSURE: 78 MMHG | OXYGEN SATURATION: 98 % | WEIGHT: 248 LBS

## 2018-10-01 DIAGNOSIS — Z23 NEED FOR INFLUENZA VACCINATION: ICD-10-CM

## 2018-10-01 DIAGNOSIS — L71.9 ROSACEA: ICD-10-CM

## 2018-10-01 DIAGNOSIS — R60.0 LEG EDEMA, RIGHT: Primary | ICD-10-CM

## 2018-10-01 DIAGNOSIS — I82.411 ACUTE DEEP VEIN THROMBOSIS (DVT) OF FEMORAL VEIN OF RIGHT LOWER EXTREMITY (HCC): ICD-10-CM

## 2018-10-01 PROCEDURE — G8427 DOCREV CUR MEDS BY ELIG CLIN: HCPCS | Performed by: NURSE PRACTITIONER

## 2018-10-01 PROCEDURE — 1036F TOBACCO NON-USER: CPT | Performed by: NURSE PRACTITIONER

## 2018-10-01 PROCEDURE — 99213 OFFICE O/P EST LOW 20 MIN: CPT | Performed by: NURSE PRACTITIONER

## 2018-10-01 PROCEDURE — G8417 CALC BMI ABV UP PARAM F/U: HCPCS | Performed by: NURSE PRACTITIONER

## 2018-10-01 PROCEDURE — G0008 ADMIN INFLUENZA VIRUS VAC: HCPCS | Performed by: NURSE PRACTITIONER

## 2018-10-01 PROCEDURE — 1101F PT FALLS ASSESS-DOCD LE1/YR: CPT | Performed by: NURSE PRACTITIONER

## 2018-10-01 PROCEDURE — 1123F ACP DISCUSS/DSCN MKR DOCD: CPT | Performed by: NURSE PRACTITIONER

## 2018-10-01 PROCEDURE — 4040F PNEUMOC VAC/ADMIN/RCVD: CPT | Performed by: NURSE PRACTITIONER

## 2018-10-01 PROCEDURE — 3017F COLORECTAL CA SCREEN DOC REV: CPT | Performed by: NURSE PRACTITIONER

## 2018-10-01 PROCEDURE — 90662 IIV NO PRSV INCREASED AG IM: CPT | Performed by: NURSE PRACTITIONER

## 2018-10-01 PROCEDURE — G8482 FLU IMMUNIZE ORDER/ADMIN: HCPCS | Performed by: NURSE PRACTITIONER

## 2018-10-01 PROCEDURE — G8598 ASA/ANTIPLAT THER USED: HCPCS | Performed by: NURSE PRACTITIONER

## 2018-10-01 ASSESSMENT — ENCOUNTER SYMPTOMS
VOMITING: 0
NAUSEA: 0
FACIAL SWELLING: 0
SORE THROAT: 0
SINUS PRESSURE: 0
COUGH: 0
DIARRHEA: 0

## 2018-10-01 NOTE — PROGRESS NOTES
Subjective:      Patient ID: Matthew Torres is a 72 y.o. male. HPI  Chief Complaint   Patient presents with    Follow-Up from Hospital     Post-op lumbar surgery dx Rt LE DVT 9/2018. Previously he had a lumbar laminectomy 4/2018 and was also dx RT LE DVT 6/2018. He continues Eliquis 5mg b.i.d. Swelling is improving in Rt leg. He is staying active and continues to help at work. Elevates LE when at rest.   He will be starting PT once speaks to IR concerning IVC filter     Prior to Visit Medications    Medication Sig Taking? Authorizing Provider   vitamin D (CHOLECALCIFEROL) 1000 UNIT TABS tablet Take 1,000 Units by mouth daily Yes Historical Provider, MD   Calcium Citrate 200 MG TABS Take 800 mg by mouth daily Yes Historical Provider, MD   apixaban (ELIQUIS) 5 MG TABS tablet 1 pill BID Yes ADIN Fernandez CNP   lisinopril (PRINIVIL;ZESTRIL) 20 MG tablet Take 1 tablet by mouth daily Yes Jim Doshi MD   ascorbic acid (VITAMIN C) 500 MG tablet Take 500 mg by mouth daily. Yes Historical Provider, MD   doxazosin (CARDURA) 8 MG tablet Take 4 mg by mouth nightly  Yes Historical Provider, MD   atorvastatin (LIPITOR) 20 MG tablet Take 20 mg by mouth daily. Yes Historical Provider, MD   fenofibrate (TRICOR) 145 MG tablet Take 145 mg by mouth daily. Yes Historical Provider, MD   metroNIDAZOLE (METROGEL) 0.75 % gel Apply topically 2 times daily.   ADIN Sanford CNP     Social History     Social History    Marital status:      Spouse name: N/A    Number of children: N/A    Years of education: N/A     Social History Main Topics    Smoking status: Former Smoker     Packs/day: 1.00     Years: 15.00     Quit date: 1/1/1967    Smokeless tobacco: Never Used    Alcohol use No    Drug use: No    Sexual activity: Yes     Partners: Female     Other Topics Concern    Not on file     Social History Narrative    No narrative on file     Family History   Problem Relation Age of Onset    Heart Disease

## 2018-10-29 ENCOUNTER — TELEPHONE (OUTPATIENT)
Dept: INTERNAL MEDICINE CLINIC | Age: 73
End: 2018-10-29

## 2018-10-29 ENCOUNTER — OFFICE VISIT (OUTPATIENT)
Dept: INTERNAL MEDICINE CLINIC | Age: 73
End: 2018-10-29
Payer: MEDICARE

## 2018-10-29 VITALS
SYSTOLIC BLOOD PRESSURE: 134 MMHG | HEIGHT: 70 IN | HEART RATE: 105 BPM | DIASTOLIC BLOOD PRESSURE: 80 MMHG | OXYGEN SATURATION: 92 % | TEMPERATURE: 98.5 F | WEIGHT: 249 LBS | BODY MASS INDEX: 35.65 KG/M2

## 2018-10-29 DIAGNOSIS — R05.9 COUGH: ICD-10-CM

## 2018-10-29 DIAGNOSIS — I82.411 ACUTE DEEP VEIN THROMBOSIS (DVT) OF FEMORAL VEIN OF RIGHT LOWER EXTREMITY (HCC): Primary | ICD-10-CM

## 2018-10-29 PROCEDURE — 4040F PNEUMOC VAC/ADMIN/RCVD: CPT | Performed by: NURSE PRACTITIONER

## 2018-10-29 PROCEDURE — G8427 DOCREV CUR MEDS BY ELIG CLIN: HCPCS | Performed by: NURSE PRACTITIONER

## 2018-10-29 PROCEDURE — 99213 OFFICE O/P EST LOW 20 MIN: CPT | Performed by: NURSE PRACTITIONER

## 2018-10-29 PROCEDURE — G8598 ASA/ANTIPLAT THER USED: HCPCS | Performed by: NURSE PRACTITIONER

## 2018-10-29 PROCEDURE — G8417 CALC BMI ABV UP PARAM F/U: HCPCS | Performed by: NURSE PRACTITIONER

## 2018-10-29 PROCEDURE — 1036F TOBACCO NON-USER: CPT | Performed by: NURSE PRACTITIONER

## 2018-10-29 PROCEDURE — G8482 FLU IMMUNIZE ORDER/ADMIN: HCPCS | Performed by: NURSE PRACTITIONER

## 2018-10-29 PROCEDURE — 1101F PT FALLS ASSESS-DOCD LE1/YR: CPT | Performed by: NURSE PRACTITIONER

## 2018-10-29 PROCEDURE — 3017F COLORECTAL CA SCREEN DOC REV: CPT | Performed by: NURSE PRACTITIONER

## 2018-10-29 PROCEDURE — 1123F ACP DISCUSS/DSCN MKR DOCD: CPT | Performed by: NURSE PRACTITIONER

## 2018-10-29 ASSESSMENT — ENCOUNTER SYMPTOMS
FACIAL SWELLING: 0
SORE THROAT: 1
COUGH: 1
VOMITING: 0
SINUS PRESSURE: 0
DIARRHEA: 0
NAUSEA: 0

## 2018-10-31 RX ORDER — BENZONATATE 200 MG/1
200 CAPSULE ORAL 3 TIMES DAILY PRN
Qty: 30 CAPSULE | Refills: 0 | Status: SHIPPED | OUTPATIENT
Start: 2018-10-31 | End: 2018-11-07

## 2018-10-31 RX ORDER — DOXYCYCLINE HYCLATE 100 MG
100 TABLET ORAL 2 TIMES DAILY
Qty: 20 TABLET | Refills: 0 | Status: SHIPPED | OUTPATIENT
Start: 2018-10-31 | End: 2018-11-10

## 2019-02-08 ENCOUNTER — TELEPHONE (OUTPATIENT)
Dept: INTERNAL MEDICINE CLINIC | Age: 74
End: 2019-02-08

## 2019-02-08 ENCOUNTER — OFFICE VISIT (OUTPATIENT)
Dept: INTERNAL MEDICINE CLINIC | Age: 74
End: 2019-02-08
Payer: MEDICARE

## 2019-02-08 VITALS
DIASTOLIC BLOOD PRESSURE: 80 MMHG | BODY MASS INDEX: 36.01 KG/M2 | SYSTOLIC BLOOD PRESSURE: 136 MMHG | WEIGHT: 251 LBS | OXYGEN SATURATION: 99 % | HEART RATE: 82 BPM

## 2019-02-08 DIAGNOSIS — R60.0 LEG EDEMA, RIGHT: ICD-10-CM

## 2019-02-08 DIAGNOSIS — I87.2 VENOUS STASIS DERMATITIS OF RIGHT LOWER EXTREMITY: ICD-10-CM

## 2019-02-08 DIAGNOSIS — R09.89 DIMINISHED PULSE: Primary | ICD-10-CM

## 2019-02-08 PROCEDURE — 99213 OFFICE O/P EST LOW 20 MIN: CPT | Performed by: NURSE PRACTITIONER

## 2019-02-08 PROCEDURE — 4040F PNEUMOC VAC/ADMIN/RCVD: CPT | Performed by: NURSE PRACTITIONER

## 2019-02-08 PROCEDURE — G8427 DOCREV CUR MEDS BY ELIG CLIN: HCPCS | Performed by: NURSE PRACTITIONER

## 2019-02-08 PROCEDURE — G8482 FLU IMMUNIZE ORDER/ADMIN: HCPCS | Performed by: NURSE PRACTITIONER

## 2019-02-08 PROCEDURE — 1123F ACP DISCUSS/DSCN MKR DOCD: CPT | Performed by: NURSE PRACTITIONER

## 2019-02-08 PROCEDURE — 1036F TOBACCO NON-USER: CPT | Performed by: NURSE PRACTITIONER

## 2019-02-08 PROCEDURE — G8417 CALC BMI ABV UP PARAM F/U: HCPCS | Performed by: NURSE PRACTITIONER

## 2019-02-08 PROCEDURE — G8598 ASA/ANTIPLAT THER USED: HCPCS | Performed by: NURSE PRACTITIONER

## 2019-02-08 PROCEDURE — 3017F COLORECTAL CA SCREEN DOC REV: CPT | Performed by: NURSE PRACTITIONER

## 2019-02-08 PROCEDURE — 1101F PT FALLS ASSESS-DOCD LE1/YR: CPT | Performed by: NURSE PRACTITIONER

## 2019-02-09 ASSESSMENT — ENCOUNTER SYMPTOMS
VOMITING: 0
NAUSEA: 0
COUGH: 0
SORE THROAT: 0
DIARRHEA: 0
SINUS PRESSURE: 0
FACIAL SWELLING: 0

## 2019-02-13 ENCOUNTER — HOSPITAL ENCOUNTER (OUTPATIENT)
Dept: VASCULAR LAB | Age: 74
Discharge: HOME | End: 2019-02-13
Payer: MEDICARE

## 2019-02-13 DIAGNOSIS — R09.89 DIMINISHED PULSE: ICD-10-CM

## 2019-02-13 PROCEDURE — 93922 UPR/L XTREMITY ART 2 LEVELS: CPT

## 2019-02-27 ENCOUNTER — HOSPITAL ENCOUNTER (OUTPATIENT)
Age: 74
Discharge: HOME | End: 2019-02-27
Payer: MEDICARE

## 2019-02-27 ENCOUNTER — HOSPITAL ENCOUNTER (OUTPATIENT)
Dept: GENERAL RADIOLOGY | Age: 74
Discharge: HOME | End: 2019-02-27
Payer: MEDICARE

## 2019-02-27 ENCOUNTER — OFFICE VISIT (OUTPATIENT)
Dept: INTERNAL MEDICINE CLINIC | Age: 74
End: 2019-02-27
Payer: MEDICARE

## 2019-02-27 VITALS
DIASTOLIC BLOOD PRESSURE: 80 MMHG | BODY MASS INDEX: 36.16 KG/M2 | WEIGHT: 252 LBS | HEART RATE: 73 BPM | SYSTOLIC BLOOD PRESSURE: 142 MMHG | OXYGEN SATURATION: 99 %

## 2019-02-27 DIAGNOSIS — M79.671 RIGHT FOOT PAIN: ICD-10-CM

## 2019-02-27 DIAGNOSIS — R35.1 BENIGN PROSTATIC HYPERPLASIA WITH NOCTURIA: ICD-10-CM

## 2019-02-27 DIAGNOSIS — Z98.1 S/P LUMBAR FUSION: ICD-10-CM

## 2019-02-27 DIAGNOSIS — N40.1 BENIGN PROSTATIC HYPERPLASIA WITH NOCTURIA: ICD-10-CM

## 2019-02-27 DIAGNOSIS — M79.642 BILATERAL HAND PAIN: ICD-10-CM

## 2019-02-27 DIAGNOSIS — Z00.00 ROUTINE GENERAL MEDICAL EXAMINATION AT A HEALTH CARE FACILITY: ICD-10-CM

## 2019-02-27 DIAGNOSIS — R20.9 SENSORY DISTURBANCE: ICD-10-CM

## 2019-02-27 DIAGNOSIS — M79.641 BILATERAL HAND PAIN: ICD-10-CM

## 2019-02-27 DIAGNOSIS — Z23 NEED FOR PROPHYLACTIC VACCINATION AND INOCULATION AGAINST VARICELLA: ICD-10-CM

## 2019-02-27 DIAGNOSIS — Z00.00 ROUTINE GENERAL MEDICAL EXAMINATION AT A HEALTH CARE FACILITY: Primary | ICD-10-CM

## 2019-02-27 DIAGNOSIS — Z23 NEED FOR DIPHTHERIA-TETANUS-PERTUSSIS (TDAP) VACCINE: ICD-10-CM

## 2019-02-27 DIAGNOSIS — C43.9 MALIGNANT MELANOMA, UNSPECIFIED SITE (HCC): ICD-10-CM

## 2019-02-27 DIAGNOSIS — H91.93 BILATERAL HEARING LOSS, UNSPECIFIED HEARING LOSS TYPE: ICD-10-CM

## 2019-02-27 LAB
A/G RATIO: 1.6 (ref 1.1–2.2)
ALBUMIN SERPL-MCNC: 4.2 G/DL (ref 3.4–5)
ALP BLD-CCNC: 48 U/L (ref 40–129)
ALT SERPL-CCNC: 25 U/L (ref 10–40)
ANION GAP SERPL CALCULATED.3IONS-SCNC: 13 MMOL/L (ref 3–16)
AST SERPL-CCNC: 22 U/L (ref 15–37)
BILIRUB SERPL-MCNC: 0.5 MG/DL (ref 0–1)
BUN BLDV-MCNC: 23 MG/DL (ref 7–20)
CALCIUM SERPL-MCNC: 9.2 MG/DL (ref 8.3–10.6)
CHLORIDE BLD-SCNC: 103 MMOL/L (ref 99–110)
CHOLESTEROL, TOTAL: 143 MG/DL (ref 0–199)
CO2: 25 MMOL/L (ref 21–32)
CREAT SERPL-MCNC: 1.3 MG/DL (ref 0.8–1.3)
GFR AFRICAN AMERICAN: >60
GFR NON-AFRICAN AMERICAN: 54
GLOBULIN: 2.6 G/DL
GLUCOSE BLD-MCNC: 107 MG/DL (ref 70–99)
HDLC SERPL-MCNC: 30 MG/DL (ref 40–60)
LDL CHOLESTEROL CALCULATED: 77 MG/DL
POTASSIUM SERPL-SCNC: 4.2 MMOL/L (ref 3.5–5.1)
PROSTATE SPECIFIC ANTIGEN: 1.25 NG/ML (ref 0–4)
SODIUM BLD-SCNC: 141 MMOL/L (ref 136–145)
TOTAL PROTEIN: 6.8 G/DL (ref 6.4–8.2)
TRIGL SERPL-MCNC: 179 MG/DL (ref 0–150)
VLDLC SERPL CALC-MCNC: 36 MG/DL

## 2019-02-27 PROCEDURE — G8427 DOCREV CUR MEDS BY ELIG CLIN: HCPCS | Performed by: NURSE PRACTITIONER

## 2019-02-27 PROCEDURE — 1123F ACP DISCUSS/DSCN MKR DOCD: CPT | Performed by: NURSE PRACTITIONER

## 2019-02-27 PROCEDURE — 1101F PT FALLS ASSESS-DOCD LE1/YR: CPT | Performed by: NURSE PRACTITIONER

## 2019-02-27 PROCEDURE — 80053 COMPREHEN METABOLIC PANEL: CPT

## 2019-02-27 PROCEDURE — 3017F COLORECTAL CA SCREEN DOC REV: CPT | Performed by: NURSE PRACTITIONER

## 2019-02-27 PROCEDURE — G0438 PPPS, INITIAL VISIT: HCPCS | Performed by: NURSE PRACTITIONER

## 2019-02-27 PROCEDURE — 80061 LIPID PANEL: CPT

## 2019-02-27 PROCEDURE — 71046 X-RAY EXAM CHEST 2 VIEWS: CPT

## 2019-02-27 PROCEDURE — G8482 FLU IMMUNIZE ORDER/ADMIN: HCPCS | Performed by: NURSE PRACTITIONER

## 2019-02-27 PROCEDURE — 99214 OFFICE O/P EST MOD 30 MIN: CPT | Performed by: NURSE PRACTITIONER

## 2019-02-27 PROCEDURE — 1036F TOBACCO NON-USER: CPT | Performed by: NURSE PRACTITIONER

## 2019-02-27 PROCEDURE — G8417 CALC BMI ABV UP PARAM F/U: HCPCS | Performed by: NURSE PRACTITIONER

## 2019-02-27 PROCEDURE — 84153 ASSAY OF PSA TOTAL: CPT

## 2019-02-27 PROCEDURE — 4040F PNEUMOC VAC/ADMIN/RCVD: CPT | Performed by: NURSE PRACTITIONER

## 2019-02-27 PROCEDURE — 36415 COLL VENOUS BLD VENIPUNCTURE: CPT

## 2019-02-27 PROCEDURE — G8598 ASA/ANTIPLAT THER USED: HCPCS | Performed by: NURSE PRACTITIONER

## 2019-02-27 RX ORDER — DOXAZOSIN 8 MG/1
8 TABLET ORAL NIGHTLY
Qty: 30 TABLET | Refills: 0
Start: 2019-02-27 | End: 2020-06-03 | Stop reason: SDUPTHER

## 2019-02-27 ASSESSMENT — LIFESTYLE VARIABLES
HOW OFTEN DURING THE LAST YEAR HAVE YOU NEEDED AN ALCOHOLIC DRINK FIRST THING IN THE MORNING TO GET YOURSELF GOING AFTER A NIGHT OF HEAVY DRINKING: 0
HOW OFTEN DO YOU HAVE SIX OR MORE DRINKS ON ONE OCCASION: 0
HOW OFTEN DURING THE LAST YEAR HAVE YOU FAILED TO DO WHAT WAS NORMALLY EXPECTED FROM YOU BECAUSE OF DRINKING: 0
AUDIT-C TOTAL SCORE: 1
HOW OFTEN DO YOU HAVE A DRINK CONTAINING ALCOHOL: 1
AUDIT-C TOTAL SCORE: 1
HOW OFTEN DURING THE LAST YEAR HAVE YOU FOUND THAT YOU WERE NOT ABLE TO STOP DRINKING ONCE YOU HAD STARTED: 0
HOW OFTEN DURING THE LAST YEAR HAVE YOU BEEN UNABLE TO REMEMBER WHAT HAPPENED THE NIGHT BEFORE BECAUSE YOU HAD BEEN DRINKING: 0
HOW OFTEN DO YOU HAVE A DRINK CONTAINING ALCOHOL: 1
HAS A RELATIVE, FRIEND, DOCTOR, OR ANOTHER HEALTH PROFESSIONAL EXPRESSED CONCERN ABOUT YOUR DRINKING OR SUGGESTED YOU CUT DOWN: 0
HOW MANY STANDARD DRINKS CONTAINING ALCOHOL DO YOU HAVE ON A TYPICAL DAY: 0
HOW OFTEN DURING THE LAST YEAR HAVE YOU HAD A FEELING OF GUILT OR REMORSE AFTER DRINKING: 0
HOW OFTEN DO YOU HAVE SIX OR MORE DRINKS ON ONE OCCASION: 0
HAVE YOU OR SOMEONE ELSE BEEN INJURED AS A RESULT OF YOUR DRINKING: 0
AUDIT TOTAL SCORE: 1
HOW MANY STANDARD DRINKS CONTAINING ALCOHOL DO YOU HAVE ON A TYPICAL DAY: 0

## 2019-02-27 ASSESSMENT — ANXIETY QUESTIONNAIRES: GAD7 TOTAL SCORE: 0

## 2019-02-27 ASSESSMENT — PATIENT HEALTH QUESTIONNAIRE - PHQ9
SUM OF ALL RESPONSES TO PHQ QUESTIONS 1-9: 0
SUM OF ALL RESPONSES TO PHQ QUESTIONS 1-9: 0

## 2019-02-28 ENCOUNTER — TELEPHONE (OUTPATIENT)
Dept: INTERNAL MEDICINE CLINIC | Age: 74
End: 2019-02-28

## 2019-02-28 DIAGNOSIS — R20.9 SENSORY DISTURBANCE: ICD-10-CM

## 2019-02-28 DIAGNOSIS — M54.16 LUMBAR RADICULOPATHY, ACUTE: Primary | ICD-10-CM

## 2019-02-28 DIAGNOSIS — M79.642 BILATERAL HAND PAIN: ICD-10-CM

## 2019-02-28 DIAGNOSIS — M79.641 BILATERAL HAND PAIN: ICD-10-CM

## 2019-02-28 DIAGNOSIS — M79.671 RIGHT FOOT PAIN: Primary | ICD-10-CM

## 2019-02-28 PROBLEM — R35.1 BENIGN PROSTATIC HYPERPLASIA WITH NOCTURIA: Status: ACTIVE | Noted: 2019-02-28

## 2019-02-28 PROBLEM — N40.1 BENIGN PROSTATIC HYPERPLASIA WITH NOCTURIA: Status: ACTIVE | Noted: 2019-02-28

## 2019-02-28 ASSESSMENT — ENCOUNTER SYMPTOMS
SORE THROAT: 0
SINUS PRESSURE: 0
BACK PAIN: 1
DIARRHEA: 0
VOMITING: 0
FACIAL SWELLING: 0
NAUSEA: 0
COUGH: 0

## 2019-03-01 PROBLEM — R73.01 IFG (IMPAIRED FASTING GLUCOSE): Status: ACTIVE | Noted: 2019-03-01

## 2019-03-06 ENCOUNTER — HOSPITAL ENCOUNTER (OUTPATIENT)
Dept: NEUROLOGY | Age: 74
Discharge: HOME | End: 2019-03-06
Payer: MEDICARE

## 2019-03-06 DIAGNOSIS — M79.641 BILATERAL HAND PAIN: ICD-10-CM

## 2019-03-06 DIAGNOSIS — M79.642 BILATERAL HAND PAIN: ICD-10-CM

## 2019-03-06 DIAGNOSIS — R20.9 SENSORY DISTURBANCE: ICD-10-CM

## 2019-03-06 PROCEDURE — 95912 NRV CNDJ TEST 11-12 STUDIES: CPT

## 2019-03-06 PROCEDURE — 95886 MUSC TEST DONE W/N TEST COMP: CPT

## 2019-03-13 ENCOUNTER — TELEPHONE (OUTPATIENT)
Dept: INTERNAL MEDICINE CLINIC | Age: 74
End: 2019-03-13

## 2019-03-13 NOTE — TELEPHONE ENCOUNTER
Patient was told to see a ortho for his right foot. He does not want to go to [place] or  doctors. He asks if there is any doctor at [facility] he can see?   Please advise   [phone]

## 2019-03-15 NOTE — TELEPHONE ENCOUNTER
They put him in a foot brace for a month, X-rays showed arthritis, and they gave him exercises to try

## 2019-04-11 ENCOUNTER — TELEPHONE (OUTPATIENT)
Dept: INTERNAL MEDICINE CLINIC | Age: 74
End: 2019-04-11

## 2019-04-11 DIAGNOSIS — M54.16 LUMBAR RADICULOPATHY, ACUTE: Primary | ICD-10-CM

## 2019-04-11 DIAGNOSIS — M79.671 RIGHT FOOT PAIN: ICD-10-CM

## 2019-04-11 DIAGNOSIS — R20.9 SENSORY DISTURBANCE: ICD-10-CM

## 2019-05-08 ENCOUNTER — TELEPHONE (OUTPATIENT)
Dept: INTERNAL MEDICINE CLINIC | Age: 74
End: 2019-05-08

## 2019-05-10 ENCOUNTER — OFFICE VISIT (OUTPATIENT)
Dept: VASCULAR SURGERY | Age: 74
End: 2019-05-10
Payer: MEDICARE

## 2019-05-10 VITALS
HEIGHT: 70 IN | BODY MASS INDEX: 36.08 KG/M2 | SYSTOLIC BLOOD PRESSURE: 150 MMHG | WEIGHT: 252 LBS | DIASTOLIC BLOOD PRESSURE: 78 MMHG

## 2019-05-10 DIAGNOSIS — M79.671 CHRONIC PAIN IN RIGHT FOOT: Primary | ICD-10-CM

## 2019-05-10 DIAGNOSIS — G89.29 CHRONIC PAIN IN RIGHT FOOT: Primary | ICD-10-CM

## 2019-05-10 PROCEDURE — G8417 CALC BMI ABV UP PARAM F/U: HCPCS | Performed by: SURGERY

## 2019-05-10 PROCEDURE — 99204 OFFICE O/P NEW MOD 45 MIN: CPT | Performed by: SURGERY

## 2019-05-10 PROCEDURE — G8427 DOCREV CUR MEDS BY ELIG CLIN: HCPCS | Performed by: SURGERY

## 2019-05-10 NOTE — PROGRESS NOTES
Outpatient Consultation / H&P    Date of Consultation:  5/10/2019    PCP:  ADIN Mcdaniel CNP     Referring Provider:  Dr. Julio Cesar Chatterjee     Chief Complaint:   Chief Complaint   Patient presents with    Other     patient ref by Jh Martines cnp for right foot pain. pambettina        History of Present Illness: We are asked to see this patient in consultation by Dr. Julio Cesar Chatterjee regarding right foot pain. Yinka Radford is a 69 y.o. male who has a history of prior RLE DVT occurring perioperatively. He has been on chronic anticoagulation. Prior IVC filter placement then removal.    Reports no significant claudication symptoms but reports continuous pain in right ankle and foot. No toe ulcers or skin breakdown. Walking does nor elevation relieve pain. Past Medical History:  Past Medical History:   Diagnosis Date    CAD (coronary artery disease)     Hearing loss     right    Hyperlipidemia     Hypertension     Melanoma ([address]     S/P coronary artery stent placement 2004       Past Surgical History:  Past Surgical History:   Procedure Laterality Date    CATARACT REMOVAL WITH IMPLANT Right     also removed free floating blood blots per pt     COLONOSCOPY  01/2008    neg    COLONOSCOPY  2003    COLONOSCOPY  5/21/2014    diverticulosis-repeat 5 years    CYST REMOVAL Left 1976    foot    EYE SURGERY Left     laser surgery for torn retina     IVC FILTER INSERTION  2018    IVC FILTER REMOVAL  2018    JOINT REPLACEMENT      LUMBAR LAMINECTOMY N/A 04/27/2018    FACETECTOMY AND INSTRUMENTED FUSION L4/5    MOHS SURGERY  2006    Melanoma neck    PTCA      2004    TONSILLECTOMY      TONSILLECTOMY AND ADENOIDECTOMY  1951    [facility]OTAL KNEE [facility]ROPLAST[facility] 10[facility]    WRIST GANGLION EXCISION Right 1979       Home Medications:   Prior to Admission medications    Medication Sig Start Date End Date Taking?  Authorizing Provider   doxazosin (CARDURA) 8 MG tablet Take 1 tablet by mouth nightly 2/27/19 Yes ADIN Ortega CNP   metroNIDAZOLE (METROGEL) 0.75 % gel Apply topically 2 times daily. 18  Yes ADIN Fernandez CNP   vitamin D (CHOLECALCIFEROL) 1000 UNIT TABS tablet Take 1,000 Units by mouth daily   Yes Historical Provider, MD   Calcium Citrate 200 MG TABS Take 800 mg by mouth daily   Yes Historical Provider, MD   apixaban (ELIQUIS) 5 MG TABS tablet 1 pill BID 18  Yes ADIN Ortega CNP   lisinopril (PRINIVIL;ZESTRIL) 20 MG tablet Take 1 tablet by mouth daily 16  Yes Francesca Romo MD   ascorbic acid (VITAMIN C) 500 MG tablet Take 500 mg by mouth daily. Yes Historical Provider, MD   atorvastatin (LIPITOR) 20 MG tablet Take 20 mg by mouth daily. Yes Historical Provider, MD   fenofibrate (TRICOR) 145 MG tablet Take 145 mg by mouth daily. Yes Historical Provider, MD        Allergies:  Corticosteroids; Cortisone;  Other; Prednisolone; and Sulfa antibiotics      Social History:      Social History     Socioeconomic History    Marital status:      Spouse name: Not on file    Number of children: Not on file    Years of education: Not on file    Highest education level: Not on file   Occupational History    Not on file   Social Needs    Financial resource strain: Not on file    Food insecurity:     Worry: Not on file     Inability: Not on file    Transportation needs:     Medical: Not on file     Non-medical: Not on file   Tobacco Use    Smoking status: Former Smoker     Packs/day: 1.00     Years: 15.00     Pack years: 15.00     Last attempt to quit: 1967     Years since quittin.3    Smokeless tobacco: Never Used   Substance and Sexual Activity    Alcohol use: No    Drug use: No    Sexual activity: Yes     Partners: Female   Lifestyle    Physical activity:     Days per week: Not on file     Minutes per session: Not on file    Stress: Not on file   Relationships    Social connections:     Talks on phone: Not on file     Gets together: Not on file Attends [employer]ism service: Not on file     Active member of club or organization: Not on file     Attends meetings of clubs or organizations: Not on file     Relationship status: Not on file    Intimate partner violence:     Fear of current or ex partner: Not on file     Emotionally abused: Not on file     Physically abused: Not on file     Forced sexual activity: Not on file   Other Topics Concern    Not on file   Social History Narrative    Not on file       Family History:        Problem Relation Age of Onset    Heart Disease Mother 76    Heart Attack Mother     Other Mother         Smoker    Heart Disease Father 55    Heart Attack Father     Cancer Brother 35        colon    Crohn's Disease Brother     Heart Disease Paternal Grandfather 39    Heart Attack Paternal Grandfather        Review of Systems:  A 14 point review of systems was completed. Pertinent positives identified in the HPI, all other review of systems negative. Physical Examination:    BP (!) 150/78 (Site: Right Upper Arm)   Ht 5' 10\" (1.778 m)   Wt 252 lb (114.3 kg)   BMI 36.16 kg/m²     Weight: 252 lb (114.3 kg)       General appearance: NAD  Eyes: PERRLA  Neck: no JVD, no lymphadenopathy. Respiratory: effort is unlabored, no crackles, wheezes or rubs. Cardiovascular: regular, no murmur. No carotid bruits. Mile edema R>L  Pulses:    femoral DP PT   RIGHT 2 2 2   LEFT 2 2 2   GI: abdomen soft, nondistended, no organomegaly. Musculoskeletal: strength and tone normal.  Extremities: warm and pink. Skin: no dermatitis or ulceration. Neuro/psychiatric: grossly intact. MEDICAL DECISION MAKING/TESTING      Arterail exam:   Right Impression   1. The right ankle/brachial index is 1.25.   2. Pulse volume recording at the ankle is within normal limits. 3. Continuous wave Doppler of the dorsalis pedis and posterior tibial arteries   demonstrate normal multiphasic flow.    4. PPG's of the toe digits indicate decreased amplitude involving the 3rd and   4th digits suggesting infra malleolar disease. 5. The toe/brachial index was performed and is normal measuring 0.92 (normal   value >0.64),   Left Impression   1. The left ankle/brachial index is 1.25.   2. Pulse volume recording at the ankle is within normal limits. 3. Continuous wave Doppler of the dorsalis pedis and posterior tibial arteries   demonstrate normal multiphasic flow. 4. PPG's of the toe digits indicate decreased amplitude involving the 4th and   5th digits suggesting infra malleolar disease. 5. The toe/brachial index was performed and is normal measuring 1.05 (normal   value >0.64),           Venous Duplex:  Chronic phlebitic changes RLE. Assessment:      Diagnosis Orders   1. Chronic pain in right foot       Unclear etiology, but non vascular. Recommendations/Plan:  Suggested compression stocking as may help decrease edema.   Further Ortho/Podiatric Tia MD Ray, FACS

## 2019-06-03 ENCOUNTER — TELEPHONE (OUTPATIENT)
Dept: INTERNAL MEDICINE CLINIC | Age: 74
End: 2019-06-03

## 2019-06-03 ENCOUNTER — OFFICE VISIT (OUTPATIENT)
Dept: INTERNAL MEDICINE CLINIC | Age: 74
End: 2019-06-03
Payer: MEDICARE

## 2019-06-03 VITALS
HEART RATE: 72 BPM | TEMPERATURE: 98.1 F | BODY MASS INDEX: 36.16 KG/M2 | DIASTOLIC BLOOD PRESSURE: 76 MMHG | OXYGEN SATURATION: 98 % | SYSTOLIC BLOOD PRESSURE: 148 MMHG | WEIGHT: 252 LBS

## 2019-06-03 DIAGNOSIS — M51.36 LUMBAR DEGENERATIVE DISC DISEASE: ICD-10-CM

## 2019-06-03 DIAGNOSIS — I82.5Z1 CHRONIC DEEP VEIN THROMBOSIS (DVT) OF DISTAL VEIN OF RIGHT LOWER EXTREMITY (HCC): ICD-10-CM

## 2019-06-03 DIAGNOSIS — R20.9 SENSORY DISTURBANCE: ICD-10-CM

## 2019-06-03 DIAGNOSIS — M51.16 LUMBAR DISC HERNIATION WITH RADICULOPATHY: ICD-10-CM

## 2019-06-03 DIAGNOSIS — M48.062 LUMBAR STENOSIS WITH NEUROGENIC CLAUDICATION: Primary | ICD-10-CM

## 2019-06-03 DIAGNOSIS — M79.671 RIGHT FOOT PAIN: Primary | ICD-10-CM

## 2019-06-03 PROCEDURE — 99213 OFFICE O/P EST LOW 20 MIN: CPT | Performed by: NURSE PRACTITIONER

## 2019-06-03 PROCEDURE — G8427 DOCREV CUR MEDS BY ELIG CLIN: HCPCS | Performed by: NURSE PRACTITIONER

## 2019-06-03 PROCEDURE — G8417 CALC BMI ABV UP PARAM F/U: HCPCS | Performed by: NURSE PRACTITIONER

## 2019-06-03 PROCEDURE — 1036F TOBACCO NON-USER: CPT | Performed by: NURSE PRACTITIONER

## 2019-06-03 PROCEDURE — 4040F PNEUMOC VAC/ADMIN/RCVD: CPT | Performed by: NURSE PRACTITIONER

## 2019-06-03 PROCEDURE — 1123F ACP DISCUSS/DSCN MKR DOCD: CPT | Performed by: NURSE PRACTITIONER

## 2019-06-03 PROCEDURE — G8598 ASA/ANTIPLAT THER USED: HCPCS | Performed by: NURSE PRACTITIONER

## 2019-06-03 PROCEDURE — 3017F COLORECTAL CA SCREEN DOC REV: CPT | Performed by: NURSE PRACTITIONER

## 2019-06-03 RX ORDER — AMLODIPINE BESYLATE 5 MG/1
5 TABLET ORAL DAILY
Qty: 90 TABLET | Refills: 1
Start: 2019-06-03 | End: 2020-11-25 | Stop reason: SDUPTHER

## 2019-06-03 RX ORDER — TIZANIDINE 2 MG/1
2 TABLET ORAL DAILY PRN
Qty: 30 TABLET | Refills: 0 | Status: SHIPPED | OUTPATIENT
Start: 2019-06-03 | End: 2020-03-04

## 2019-06-03 ASSESSMENT — ENCOUNTER SYMPTOMS
SORE THROAT: 0
VOMITING: 0
COUGH: 0
FACIAL SWELLING: 0
DIARRHEA: 0
SINUS PRESSURE: 0
NAUSEA: 0

## 2019-06-03 NOTE — PROGRESS NOTES
Subjective:      Patient ID: Charlotte Gambino is a 69 y.o. male. HPI  Chief Complaint   Patient presents with    Foot Swelling    Leg Swelling     Pt has hx Rt DVT, chronic. Last venous doppler performed 10/2018. CHRISTOPH showed no arterial insufficiency but some chronic venous changes. EMG unremarkable LE. Podiatry dx arthritis and recommend ankle brace x 1 month and exercises. No relief with brace and exercise. States brace worsened symptoms. Podiatrist recommend vascular evaluation. Dr Ami Figueroa evaluated patient and stated compression hose daily and f/u podiatry. Pt states Dr Ami Figeuroa also stated to d/c Eliquis d/t injury and on medication 6-12 months. He states symptoms are persistent. Feeling of rubberband at ankle x several months. Feeling of sand between toes. Itching over skin above ankle. Has tried metrogel, no relief. Has tried cerave, no relief. Has tried cortisone - some relief. Feelings of severe pain in great toe and heel. Prior to Visit Medications    Medication Sig Taking? Authorizing Provider   amLODIPine (NORVASC) 5 MG tablet Take 1 tablet by mouth daily Yes ADIN Fernandez - CNP   tiZANidine (ZANAFLEX) 2 MG tablet Take 1 tablet by mouth daily as needed (spasm of back) Yes ADIN Fernandez - CNP   doxazosin (CARDURA) 8 MG tablet Take 1 tablet by mouth nightly Yes ADIN Fernandez - CNP   metroNIDAZOLE (METROGEL) 0.75 % gel Apply topically 2 times daily. Yes ADIN Hermosillo - CNP   vitamin D (CHOLECALCIFEROL) 1000 UNIT TABS tablet Take 1,000 Units by mouth daily Yes Historical Provider, MD   Calcium Citrate 200 MG TABS Take 800 mg by mouth daily Yes Historical Provider, MD   apixaban (ELIQUIS) 5 MG TABS tablet 1 pill BID Yes ADIN Fernandez - CNP   lisinopril (PRINIVIL;ZESTRIL) 20 MG tablet Take 1 tablet by mouth daily Yes Kaelyn Wilcox MD   ascorbic acid (VITAMIN C) 500 MG tablet Take 500 mg by mouth daily.  Yes Historical Provider, MD   atorvastatin (LIPITOR) 20 MG tablet Take 20 mg by mouth daily. Yes Historical Provider, MD   fenofibrate (TRICOR) 145 MG tablet Take 145 mg by mouth daily.    Yes Historical Provider, MD     Social History     Socioeconomic History    Marital status:      Spouse name: Not on file    Number of children: Not on file    Years of education: Not on file    Highest education level: Not on file   Occupational History    Not on file   Social Needs    Financial resource strain: Not on file    Food insecurity:     Worry: Not on file     Inability: Not on file    Transportation needs:     Medical: Not on file     Non-medical: Not on file   Tobacco Use    Smoking status: Former Smoker     Packs/day: 1.00     Years: 15.00     Pack years: 15.00     Last attempt to quit: 1967     Years since quittin.4    Smokeless tobacco: Never Used   Substance and Sexual Activity    Alcohol use: No    Drug use: No    Sexual activity: Yes     Partners: Female   Lifestyle    Physical activity:     Days per week: Not on file     Minutes per session: Not on file    Stress: Not on file   Relationships    Social connections:     Talks on phone: Not on file     Gets together: Not on file     Attends Jewish service: Not on file     Active member of club or organization: Not on file     Attends meetings of clubs or organizations: Not on file     Relationship status: Not on file    Intimate partner violence:     Fear of current or ex partner: Not on file     Emotionally abused: Not on file     Physically abused: Not on file     Forced sexual activity: Not on file   Other Topics Concern    Not on file   Social History Narrative    Not on file     Family History   Problem Relation Age of Onset    Heart Disease Mother 76    Heart Attack Mother     Other Mother         Smoker    Heart Disease Father 55    Heart Attack Father     Cancer Brother 35        colon    Crohn's Disease Brother     Heart Disease Paternal Grandfather 39    Heart Attack Paternal Grandfather        Review of Systems   Constitutional: Negative for appetite change, chills, fatigue, fever and unexpected weight change. HENT: Negative for congestion, ear discharge, ear pain, facial swelling, hearing loss, sinus pressure, sneezing and sore throat. Respiratory: Negative for cough. Cardiovascular: Positive for leg swelling (RLE). Negative for chest pain. Gastrointestinal: Negative for diarrhea, nausea and vomiting. Genitourinary: Negative for difficulty urinating, dysuria, hematuria and urgency. Musculoskeletal: Negative for arthralgias and gait problem. Neurological: Negative for dizziness, weakness and headaches. Hematological: Negative for adenopathy. Psychiatric/Behavioral: Negative for sleep disturbance and suicidal ideas. Objective:   Physical Exam   Constitutional: He is oriented to person, place, and time. He appears well-developed and well-nourished. No distress. HENT:   Head: Normocephalic and atraumatic. Cardiovascular: Normal rate, regular rhythm, normal heart sounds and intact distal pulses. Pulmonary/Chest: Effort normal and breath sounds normal. He has no wheezes. Musculoskeletal:   Mild RLE edema < 1+ pitting. No erythema. No warmth. Pulse 2+. Neurological: He is alert and oriented to person, place, and time. No cranial nerve deficit. Skin: He is not diaphoretic. Assessment:      1. Right foot pain  Referral placed  Wear compression hose daily    - Collins Landry MD, Orthopedic Surgery, [facility]    2. Sensory disturbance  Referral placed    - Collins Landry MD, Orthopedic Surgery, [facility]    3. 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 May consider d/c Eliquis in future    - [facility] - Kassi James MD, Oncology, [facility]          Plan:      See above plan    Return if symptoms worsen or fail to improve.

## 2019-06-12 ENCOUNTER — TELEPHONE (OUTPATIENT)
Dept: INTERNAL MEDICINE CLINIC | Age: 74
End: 2019-06-12

## 2019-06-12 NOTE — TELEPHONE ENCOUNTER
Patient states he had hemotology appt today 6/12/19.    If he needs an with Cholo Hampton next, please call him at [phone]

## 2019-06-13 NOTE — TELEPHONE ENCOUNTER
Patient notified. Patient mentioned that there were some things that he discussed with the hematologist and he thought those notes may not have been in his note. He would like to talk to Georgie Castaneda if he could. Patient knows you are not in the office until tomorrow.

## 2019-06-14 NOTE — TELEPHONE ENCOUNTER
Spoke with patient and he is going to continue Eliquis as a recommendation from Hematology and will even restart Meloxicam p.r.n for arthritic pain. He is aware of risks of bleeding and will monitor very closely.

## 2019-06-24 ENCOUNTER — TELEPHONE (OUTPATIENT)
Dept: INTERNAL MEDICINE CLINIC | Age: 74
End: 2019-06-24

## 2019-06-24 DIAGNOSIS — M54.5 CHRONIC LOW BACK PAIN, UNSPECIFIED BACK PAIN LATERALITY, WITH SCIATICA PRESENCE UNSPECIFIED: ICD-10-CM

## 2019-06-24 DIAGNOSIS — G89.29 CHRONIC LOW BACK PAIN, UNSPECIFIED BACK PAIN LATERALITY, WITH SCIATICA PRESENCE UNSPECIFIED: ICD-10-CM

## 2019-06-24 RX ORDER — MELOXICAM 15 MG/1
TABLET ORAL
Qty: 90 TABLET | Refills: 1 | Status: CANCELLED | OUTPATIENT
Start: 2019-06-24

## 2019-06-24 RX ORDER — MELOXICAM 7.5 MG/1
7.5 TABLET ORAL DAILY PRN
Qty: 30 TABLET | Refills: 5 | Status: SHIPPED | OUTPATIENT
Start: 2019-06-24 | End: 2020-03-04

## 2019-07-09 ENCOUNTER — OFFICE VISIT (OUTPATIENT)
Dept: ORTHOPEDIC SURGERY | Age: 74
End: 2019-07-09
Payer: MEDICARE

## 2019-07-09 VITALS
DIASTOLIC BLOOD PRESSURE: 72 MMHG | HEART RATE: 100 BPM | WEIGHT: 251.99 LBS | BODY MASS INDEX: 36.07 KG/M2 | HEIGHT: 70 IN | SYSTOLIC BLOOD PRESSURE: 141 MMHG

## 2019-07-09 DIAGNOSIS — M25.571 RIGHT ANKLE PAIN, UNSPECIFIED CHRONICITY: ICD-10-CM

## 2019-07-09 DIAGNOSIS — M25.572 LEFT ANKLE PAIN, UNSPECIFIED CHRONICITY: Primary | ICD-10-CM

## 2019-07-09 PROCEDURE — 99204 OFFICE O/P NEW MOD 45 MIN: CPT | Performed by: ORTHOPAEDIC SURGERY

## 2019-07-09 PROCEDURE — G8428 CUR MEDS NOT DOCUMENT: HCPCS | Performed by: ORTHOPAEDIC SURGERY

## 2019-07-09 PROCEDURE — G8598 ASA/ANTIPLAT THER USED: HCPCS | Performed by: ORTHOPAEDIC SURGERY

## 2019-07-09 PROCEDURE — 1123F ACP DISCUSS/DSCN MKR DOCD: CPT | Performed by: ORTHOPAEDIC SURGERY

## 2019-07-09 PROCEDURE — 4040F PNEUMOC VAC/ADMIN/RCVD: CPT | Performed by: ORTHOPAEDIC SURGERY

## 2019-07-09 PROCEDURE — 1036F TOBACCO NON-USER: CPT | Performed by: ORTHOPAEDIC SURGERY

## 2019-07-09 PROCEDURE — G8417 CALC BMI ABV UP PARAM F/U: HCPCS | Performed by: ORTHOPAEDIC SURGERY

## 2019-07-09 PROCEDURE — 3017F COLORECTAL CA SCREEN DOC REV: CPT | Performed by: ORTHOPAEDIC SURGERY

## 2019-07-10 NOTE — PROGRESS NOTES
Chief Complaint    New Patient (RT ankle pain)      History of Present Illness:  Cheri Magallanes is a 69 y.o. malehere for evaluation chief complaint of right midfoot pain. He states this started about 10 months ago. He has had 2 back surgeries in the past 2 years and when he was recovering he fell hit his face and hurt his right ankle. During his postoperative care he developed a blood clot twice in the right lower extremity. He is currently on Eliquis. He had a Jaden filter placed which was subsequently taken out with his second DVT and he states he had stents put in his right leg. Currently complains of a constant tightness in his right ankle and foot. It is mainly on the dorsal aspect of the proximal midfoot. He has seen a physician for this in the past and he was placed into a brace which she states made it worse because the strap went right across this area. Currently complains of 6 out of 10 pain. Its constant. Using it makes it worse rest seems to make it better. .        Medical History:  Patient's medications, allergies, past medical, surgical, social and family histories were reviewed and updated as appropriate. Review of Systems:  Pertinent items are noted in HPI  Review of systems reviewed from Patient History Form dated on 7/9/2019 and available in the patient's chart under the Media tab. Vital Signs:  BP (!) 141/72   Pulse 100   Ht 5' 10\" (1.778 m)   Wt 251 lb 15.8 oz (114.3 kg)   BMI 36.16 kg/m²     General Exam:   Constitutional: Patient is adequately groomed with no evidence of malnutrition  DTRs: Deep tendon reflexes are intact  Mental Status: The patient is oriented to time, place and person. The patient's mood and affect are appropriate. Lymphatic: The lymphatic examination bilaterally reveals all areas to be without enlargement or induration.     Foot Examination:    Inspection: Minimal swelling through his right ankle and foot no erythema or ecchymosis    Palpation:

## 2019-07-24 ENCOUNTER — HOSPITAL ENCOUNTER (OUTPATIENT)
Dept: MRI IMAGING | Age: 74
Discharge: HOME | End: 2019-07-24
Payer: MEDICARE

## 2019-07-24 DIAGNOSIS — M25.571 RIGHT ANKLE PAIN, UNSPECIFIED CHRONICITY: ICD-10-CM

## 2019-07-24 PROCEDURE — 73721 MRI JNT OF LWR EXTRE W/O DYE: CPT

## 2019-07-25 ENCOUNTER — TELEPHONE (OUTPATIENT)
Dept: ORTHOPEDIC SURGERY | Age: 74
End: 2019-07-25

## 2019-07-25 DIAGNOSIS — M25.571 RIGHT ANKLE PAIN, UNSPECIFIED CHRONICITY: Primary | ICD-10-CM

## 2019-07-26 NOTE — TELEPHONE ENCOUNTER
Midfoot arthritis low-grade ankle sprain plantar fasciitis and mild swelling in his ankle joint nothing that would require anything surgical

## 2019-08-27 DIAGNOSIS — I82.411 ACUTE DEEP VEIN THROMBOSIS (DVT) OF FEMORAL VEIN OF RIGHT LOWER EXTREMITY (HCC): ICD-10-CM

## 2019-12-12 DIAGNOSIS — T15.90XA FOREIGN BODY IN EYE, UNSPECIFIED LATERALITY, INITIAL ENCOUNTER: Primary | ICD-10-CM

## 2020-02-12 ENCOUNTER — OFFICE VISIT (OUTPATIENT)
Dept: INTERNAL MEDICINE CLINIC | Age: 75
End: 2020-02-12
Payer: MEDICARE

## 2020-02-12 ENCOUNTER — TELEPHONE (OUTPATIENT)
Dept: FAMILY MEDICINE CLINIC | Age: 75
End: 2020-02-12

## 2020-02-12 VITALS
OXYGEN SATURATION: 98 % | SYSTOLIC BLOOD PRESSURE: 128 MMHG | WEIGHT: 251 LBS | BODY MASS INDEX: 36.01 KG/M2 | HEART RATE: 76 BPM | DIASTOLIC BLOOD PRESSURE: 62 MMHG

## 2020-02-12 PROCEDURE — G8417 CALC BMI ABV UP PARAM F/U: HCPCS | Performed by: NURSE PRACTITIONER

## 2020-02-12 PROCEDURE — 1036F TOBACCO NON-USER: CPT | Performed by: NURSE PRACTITIONER

## 2020-02-12 PROCEDURE — 3288F FALL RISK ASSESSMENT DOCD: CPT | Performed by: NURSE PRACTITIONER

## 2020-02-12 PROCEDURE — G8482 FLU IMMUNIZE ORDER/ADMIN: HCPCS | Performed by: NURSE PRACTITIONER

## 2020-02-12 PROCEDURE — 4040F PNEUMOC VAC/ADMIN/RCVD: CPT | Performed by: NURSE PRACTITIONER

## 2020-02-12 PROCEDURE — 3017F COLORECTAL CA SCREEN DOC REV: CPT | Performed by: NURSE PRACTITIONER

## 2020-02-12 PROCEDURE — 1123F ACP DISCUSS/DSCN MKR DOCD: CPT | Performed by: NURSE PRACTITIONER

## 2020-02-12 PROCEDURE — G8427 DOCREV CUR MEDS BY ELIG CLIN: HCPCS | Performed by: NURSE PRACTITIONER

## 2020-02-12 PROCEDURE — 99213 OFFICE O/P EST LOW 20 MIN: CPT | Performed by: NURSE PRACTITIONER

## 2020-02-12 PROCEDURE — G0008 ADMIN INFLUENZA VIRUS VAC: HCPCS | Performed by: NURSE PRACTITIONER

## 2020-02-12 PROCEDURE — G8510 SCR DEP NEG, NO PLAN REQD: HCPCS | Performed by: NURSE PRACTITIONER

## 2020-02-12 PROCEDURE — 90686 IIV4 VACC NO PRSV 0.5 ML IM: CPT | Performed by: NURSE PRACTITIONER

## 2020-02-12 RX ORDER — PREGABALIN 50 MG/1
50 CAPSULE ORAL 3 TIMES DAILY
Qty: 90 CAPSULE | Refills: 0 | Status: SHIPPED | OUTPATIENT
Start: 2020-02-12 | End: 2020-03-04

## 2020-02-12 ASSESSMENT — ENCOUNTER SYMPTOMS
GASTROINTESTINAL NEGATIVE: 1
STRIDOR: 0
BACK PAIN: 1
SHORTNESS OF BREATH: 0
EYES NEGATIVE: 1
COUGH: 0

## 2020-02-12 ASSESSMENT — PATIENT HEALTH QUESTIONNAIRE - PHQ9
2. FEELING DOWN, DEPRESSED OR HOPELESS: 0
SUM OF ALL RESPONSES TO PHQ9 QUESTIONS 1 & 2: 0
SUM OF ALL RESPONSES TO PHQ QUESTIONS 1-9: 0
SUM OF ALL RESPONSES TO PHQ QUESTIONS 1-9: 0
1. LITTLE INTEREST OR PLEASURE IN DOING THINGS: 0

## 2020-02-12 NOTE — PROGRESS NOTES
Vaccine Information Sheet, \"Influenza - Inactivated\"  given to Shukri Rosa, or parent/legal guardian of  Shukri Rosa and verbalized understanding. Patient responses:    Have you ever had a reaction to a flu vaccine? No  Do you have any current illness? No  Have you ever had Guillian Clovis Syndrome? No and   Do you have a serious allergy to any of the follow: Neomycin, Polymyxin, Thimerosal, eggs or egg products? No    Flu vaccine given per order. Please see immunization tab. Risks and benefits explained. Current VIS given. Patient has been going to PT for back pain.  Hui

## 2020-02-12 NOTE — PROGRESS NOTES
Bowen Fernandes  1945      HPI:  Chief Complaint   Patient presents with    Ankle Pain     no better. Rt ankle x 1 year. He has seen vascular specialist, no recommendation. Dr Basil Esparza recommended \"lace up brace\". He did not like this as it felt it was too tight. Has tried bio-med pain cream w/o relief. Did not suggest physical therapy. Ankle \"burning\" at night after along day of work. Toes feel \"numb\". Tingling in foot. Pain 4/10. Nighttime 8/10. Tylenol for pain. /62 (Site: Right Upper Arm, Position: Sitting, Cuff Size: Large Adult)   Pulse 76   Wt 251 lb (113.9 kg)   SpO2 98%   BMI 36.01 kg/m²     Prior to Visit Medications    Medication Sig Taking? Authorizing Provider   pregabalin (LYRICA) 50 MG capsule Take 1 capsule by mouth 3 times daily for 30 days. Yes ADIN Banuelos CNP   apixaban (ELIQUIS) 5 MG TABS tablet TAKE 1 TABLET TWICE A DAY Yes ADIN Fernandez CNP   Diclofenac Sodium POWD 2 g by Does not apply route 3 times daily Formula 3D Deann 5% Baclo 2% Diclo 3% Lashay 6% Lido 2% Prilo 2% Yes Lenda Schilder, MD   meloxicam (MOBIC) 7.5 MG tablet Take 1 tablet by mouth daily as needed for Pain Yes ADIN Fernandez CNP   amLODIPine (NORVASC) 5 MG tablet Take 1 tablet by mouth daily Yes ADIN Fernandez CNP   tiZANidine (ZANAFLEX) 2 MG tablet Take 1 tablet by mouth daily as needed (spasm of back) Yes ADIN Fernandez CNP   doxazosin (CARDURA) 8 MG tablet Take 1 tablet by mouth nightly Yes ADIN Fernandez CNP   metroNIDAZOLE (METROGEL) 0.75 % gel Apply topically 2 times daily. Yes ADIN Banuelos CNP   vitamin D (CHOLECALCIFEROL) 1000 UNIT TABS tablet Take 1,000 Units by mouth daily Yes Historical Provider, MD   Calcium Citrate 200 MG TABS Take 800 mg by mouth daily Yes Historical Provider, MD   lisinopril (PRINIVIL;ZESTRIL) 20 MG tablet Take 1 tablet by mouth daily Yes Jonathan Sharpe MD   ascorbic acid (VITAMIN C) 500 MG tablet Take 500 mg by mouth daily.  Yes Historical Provider, MD   atorvastatin (LIPITOR) 20 MG tablet Take 20 mg by mouth daily. Yes Historical Provider, MD   fenofibrate (TRICOR) 145 MG tablet Take 145 mg by mouth daily.    Yes Historical Provider, MD     Family History   Problem Relation Age of Onset    Heart Disease Mother 76    Heart Attack Mother     Other Mother         Smoker    Heart Disease Father 55    Heart Attack Father     Cancer Brother 35        colon    Crohn's Disease Brother     Heart Disease Paternal Grandfather 39    Heart Attack Paternal Grandfather      Social History     Socioeconomic History    Marital status:      Spouse name: Not on file    Number of children: Not on file    Years of education: Not on file    Highest education level: Not on file   Occupational History    Not on file   Social Needs    Financial resource strain: Not on file    Food insecurity:     Worry: Not on file     Inability: Not on file    Transportation needs:     Medical: Not on file     Non-medical: Not on file   Tobacco Use    Smoking status: Former Smoker     Packs/day: 1.00     Years: 15.00     Pack years: 15.00     Last attempt to quit: 1967     Years since quittin.1    Smokeless tobacco: Never Used   Substance and Sexual Activity    Alcohol use: No    Drug use: No    Sexual activity: Yes     Partners: Female   Lifestyle    Physical activity:     Days per week: Not on file     Minutes per session: Not on file    Stress: Not on file   Relationships    Social connections:     Talks on phone: Not on file     Gets together: Not on file     Attends Caodaism service: Not on file     Active member of club or organization: Not on file     Attends meetings of clubs or organizations: Not on file     Relationship status: Not on file    Intimate partner violence:     Fear of current or ex partner: Not on file     Emotionally abused: Not on file     Physically abused: Not on file     Forced sexual activity: Not on file Other Topics Concern    Not on file   Social History Narrative    Not on file       Review of Systems   Constitutional: Negative for activity change, fatigue and fever. HENT: Negative. Eyes: Negative. Respiratory: Negative for cough, shortness of breath and stridor. Cardiovascular: Negative for chest pain, palpitations and leg swelling. Gastrointestinal: Negative. Genitourinary: Negative. Musculoskeletal: Positive for arthralgias, back pain and joint swelling. Moderate R ankle pain, swelling, tingling, numbness, and burning sensation. Mild L ankle pain. Skin: Negative. Hematological: Negative. Psychiatric/Behavioral: Negative. Physical Exam  Constitutional:       Appearance: Normal appearance. He is obese. HENT:      Head: Normocephalic. Neck:      Musculoskeletal: Normal range of motion and neck supple. Cardiovascular:      Rate and Rhythm: Normal rate and regular rhythm. Pulses: Normal pulses. Heart sounds: Normal heart sounds. Pulmonary:      Effort: Pulmonary effort is normal.      Breath sounds: Normal breath sounds. Musculoskeletal:         General: Swelling present. No tenderness. Right lower leg: Edema present. Comments: R ankle edematous with limited ROM,and stiffness present. Negative for discoloration, tenderness, inability to bear weight. Skin:     General: Skin is warm and dry. Capillary Refill: Capillary refill takes less than 2 seconds. Neurological:      General: No focal deficit present. Mental Status: He is alert and oriented to person, place, and time. Psychiatric:         Mood and Affect: Mood normal.         Assessment:     1. Arthritis of right ankle  Will continue Tylenol as needed  Add Physical therapy at [place]  Trial Lyrica once to three times a day (side effects to gabapentin in the past)    2. Plantar fasciitis of right foot  Perform PT and may consider consult with Dr Regla Queen again    3.  Chronic

## 2020-02-14 ENCOUNTER — TELEPHONE (OUTPATIENT)
Dept: INTERNAL MEDICINE CLINIC | Age: 75
End: 2020-02-14

## 2020-02-14 NOTE — TELEPHONE ENCOUNTER
Patient states that Ale Agarwal NP called in a Rx of Lyrica and Medicare denied it. Patient states that Lyudmila Holland told him if this happens to give her a call and she would look for something else.        [phone]

## 2020-02-17 RX ORDER — DULOXETIN HYDROCHLORIDE 30 MG/1
CAPSULE, DELAYED RELEASE ORAL
Qty: 60 CAPSULE | Refills: 1 | Status: SHIPPED | OUTPATIENT
Start: 2020-02-17 | End: 2020-03-13

## 2020-03-04 ENCOUNTER — TELEPHONE (OUTPATIENT)
Dept: INTERNAL MEDICINE CLINIC | Age: 75
End: 2020-03-04

## 2020-03-04 ENCOUNTER — OFFICE VISIT (OUTPATIENT)
Dept: INTERNAL MEDICINE CLINIC | Age: 75
End: 2020-03-04
Payer: MEDICARE

## 2020-03-04 ENCOUNTER — HOSPITAL ENCOUNTER (OUTPATIENT)
Age: 75
Discharge: HOME | End: 2020-03-04
Payer: MEDICARE

## 2020-03-04 VITALS
OXYGEN SATURATION: 98 % | WEIGHT: 259.4 LBS | BODY MASS INDEX: 37.14 KG/M2 | DIASTOLIC BLOOD PRESSURE: 78 MMHG | HEIGHT: 70 IN | SYSTOLIC BLOOD PRESSURE: 128 MMHG | RESPIRATION RATE: 16 BRPM | HEART RATE: 68 BPM

## 2020-03-04 LAB
A/G RATIO: 1.5 (ref 1.1–2.2)
ALBUMIN SERPL-MCNC: 4.1 G/DL (ref 3.4–5)
ALP BLD-CCNC: 37 U/L (ref 40–129)
ALT SERPL-CCNC: 32 U/L (ref 10–40)
ANION GAP SERPL CALCULATED.3IONS-SCNC: 15 MMOL/L (ref 3–16)
AST SERPL-CCNC: 23 U/L (ref 15–37)
BILIRUB SERPL-MCNC: 0.6 MG/DL (ref 0–1)
BUN BLDV-MCNC: 24 MG/DL (ref 7–20)
CALCIUM SERPL-MCNC: 9 MG/DL (ref 8.3–10.6)
CHLORIDE BLD-SCNC: 102 MMOL/L (ref 99–110)
CHOLESTEROL, TOTAL: 143 MG/DL (ref 0–199)
CO2: 25 MMOL/L (ref 21–32)
CREAT SERPL-MCNC: 1.2 MG/DL (ref 0.8–1.3)
GFR AFRICAN AMERICAN: >60
GFR NON-AFRICAN AMERICAN: 59
GLOBULIN: 2.7 G/DL
GLUCOSE BLD-MCNC: 102 MG/DL (ref 70–99)
HDLC SERPL-MCNC: 35 MG/DL (ref 40–60)
LDL CHOLESTEROL CALCULATED: 87 MG/DL
POTASSIUM SERPL-SCNC: 4.8 MMOL/L (ref 3.5–5.1)
PROSTATE SPECIFIC ANTIGEN: 1.32 NG/ML (ref 0–4)
SODIUM BLD-SCNC: 142 MMOL/L (ref 136–145)
TOTAL PROTEIN: 6.8 G/DL (ref 6.4–8.2)
TRIGL SERPL-MCNC: 104 MG/DL (ref 0–150)
VLDLC SERPL CALC-MCNC: 21 MG/DL

## 2020-03-04 PROCEDURE — 84153 ASSAY OF PSA TOTAL: CPT

## 2020-03-04 PROCEDURE — 3017F COLORECTAL CA SCREEN DOC REV: CPT | Performed by: NURSE PRACTITIONER

## 2020-03-04 PROCEDURE — 36415 COLL VENOUS BLD VENIPUNCTURE: CPT

## 2020-03-04 PROCEDURE — 1123F ACP DISCUSS/DSCN MKR DOCD: CPT | Performed by: NURSE PRACTITIONER

## 2020-03-04 PROCEDURE — 4040F PNEUMOC VAC/ADMIN/RCVD: CPT | Performed by: NURSE PRACTITIONER

## 2020-03-04 PROCEDURE — 80053 COMPREHEN METABOLIC PANEL: CPT

## 2020-03-04 PROCEDURE — 80061 LIPID PANEL: CPT

## 2020-03-04 PROCEDURE — G0439 PPPS, SUBSEQ VISIT: HCPCS | Performed by: NURSE PRACTITIONER

## 2020-03-04 PROCEDURE — G8482 FLU IMMUNIZE ORDER/ADMIN: HCPCS | Performed by: NURSE PRACTITIONER

## 2020-03-04 ASSESSMENT — LIFESTYLE VARIABLES
AUDIT-C TOTAL SCORE: 1
HOW OFTEN DO YOU HAVE SIX OR MORE DRINKS ON ONE OCCASION: 0
AUDIT TOTAL SCORE: 1
HAVE YOU OR SOMEONE ELSE BEEN INJURED AS A RESULT OF YOUR DRINKING: 0
HOW OFTEN DURING THE LAST YEAR HAVE YOU FOUND THAT YOU WERE NOT ABLE TO STOP DRINKING ONCE YOU HAD STARTED: 0
HOW OFTEN DURING THE LAST YEAR HAVE YOU BEEN UNABLE TO REMEMBER WHAT HAPPENED THE NIGHT BEFORE BECAUSE YOU HAD BEEN DRINKING: 0
HOW OFTEN DURING THE LAST YEAR HAVE YOU HAD A FEELING OF GUILT OR REMORSE AFTER DRINKING: 0
HOW OFTEN DURING THE LAST YEAR HAVE YOU FAILED TO DO WHAT WAS NORMALLY EXPECTED FROM YOU BECAUSE OF DRINKING: 0
HOW OFTEN DURING THE LAST YEAR HAVE YOU NEEDED AN ALCOHOLIC DRINK FIRST THING IN THE MORNING TO GET YOURSELF GOING AFTER A NIGHT OF HEAVY DRINKING: 0
HOW MANY STANDARD DRINKS CONTAINING ALCOHOL DO YOU HAVE ON A TYPICAL DAY: 0
HAS A RELATIVE, FRIEND, DOCTOR, OR ANOTHER HEALTH PROFESSIONAL EXPRESSED CONCERN ABOUT YOUR DRINKING OR SUGGESTED YOU CUT DOWN: 0
HOW OFTEN DO YOU HAVE A DRINK CONTAINING ALCOHOL: 1

## 2020-03-04 ASSESSMENT — PATIENT HEALTH QUESTIONNAIRE - PHQ9
2. FEELING DOWN, DEPRESSED OR HOPELESS: 0
SUM OF ALL RESPONSES TO PHQ9 QUESTIONS 1 & 2: 0
1. LITTLE INTEREST OR PLEASURE IN DOING THINGS: 0
SUM OF ALL RESPONSES TO PHQ QUESTIONS 1-9: 0
SUM OF ALL RESPONSES TO PHQ QUESTIONS 1-9: 0

## 2020-03-04 NOTE — PROGRESS NOTES
Medicare Annual Wellness Visit  Name: Sharita Ronquillo Date: 3/4/2020   MRN: [id number] Sex: Male   Age: 70 y.o. Ethnicity: Non-/Non    : 1945 Race: Yayo Chatterjee is here for Medicare AWV    Screenings for behavioral, psychosocial and functional/safety risks, and cognitive dysfunction are all negative except as indicated below. These results, as well as other patient data from the [address] form, are documented in Flowsheets linked to this Encounter. Allergies   Allergen Reactions    Corticosteroids      Other reaction(s): unable to urinate    Cortisone Nausea Only    Gabapentin Other (See Comments)     Shaky    Other      STEROIDS    Prednisolone     Sulfa Antibiotics Rash and Other (See Comments)     Unknown, Rash possibly. Prior to Visit Medications    Medication Sig Taking? Authorizing Provider   DULoxetine (CYMBALTA) 30 MG extended release capsule Take 1 capsule daily x 1 week then increase 2 capsules once daily Yes ADIN Fernandez CNP   apixaban (ELIQUIS) 5 MG TABS tablet TAKE 1 TABLET TWICE A DAY Yes ADIN Fernandez CNP   Diclofenac Sodium POWD 2 g by Does not apply route 3 times daily Formula 3D Deann 5% Baclo 2% Diclo 3% Lashay 6% Lido 2% Prilo 2% Yes Yanique Hayden MD   amLODIPine (NORVASC) 5 MG tablet Take 1 tablet by mouth daily Yes Melvyn Harada, APRN - CNP   doxazosin (CARDURA) 8 MG tablet Take 1 tablet by mouth nightly Yes ADIN Fernandez CNP   metroNIDAZOLE (METROGEL) 0.75 % gel Apply topically 2 times daily. Yes Melvyn Harada, APRN - CNP   vitamin D (CHOLECALCIFEROL) 1000 UNIT TABS tablet Take 1,000 Units by mouth daily Yes Historical Provider, MD   Calcium Citrate 200 MG TABS Take 800 mg by mouth daily Yes Historical Provider, MD   lisinopril (PRINIVIL;ZESTRIL) 20 MG tablet Take 1 tablet by mouth daily Yes Enrrique Godinez MD   ascorbic acid (VITAMIN C) 500 MG tablet Take 500 mg by mouth daily.  Yes Historical Provider, MD atorvastatin (LIPITOR) 20 MG tablet Take 20 mg by mouth daily. Yes Historical Provider, MD   fenofibrate (TRICOR) 145 MG tablet Take 145 mg by mouth daily.    Yes Historical Provider, MD         Past Medical History:   Diagnosis Date    CAD (coronary artery disease)     Hearing loss     right    Hyperlipidemia     Hypertension     Melanoma ([address]     S/P coronary artery stent placement 2004       Past Surgical History:   Procedure Laterality Date    CATARACT REMOVAL WITH IMPLANT Right     also removed free floating blood blots per pt     COLONOSCOPY  01/2008    neg    COLONOSCOPY  2003    COLONOSCOPY  5/21/2014    diverticulosis-repeat 5 years    CYST REMOVAL Left 1976    foot    EYE SURGERY Left     laser surgery for torn retina     IVC FILTER INSERTION  2018    IVC FILTER REMOVAL  2018    JOINT REPLACEMENT      LUMBAR LAMINECTOMY N/A 04/27/2018    FACETECTOMY AND INSTRUMENTED FUSION L4/5    MOHS SURGERY  2006    Melanoma neck    PTCA      2004    TONSILLECTOMY      TONSILLECTOMY AND ADENOIDECTOMY  1951    T[facility]    WRIST GANGLION EXCISION Right 1979         Family History   Problem Relation Age of Onset    Heart Disease Mother 76    Heart Attack Mother     Other Mother         Smoker    Heart Disease Father 55    Heart Attack Father     Cancer Brother 35        colon    Crohn's Disease Brother     Heart Disease Paternal Grandfather 39    Heart Attack Paternal Grandfather        CareTeam (Including outside providers/suppliers regularly involved in providing care):   Patient Care Team:  ADIN Hendricks CNP as PCP - General  ADIN Hendricks CNP as PCP - [facility] Empaneled Provider  Jeanine Bowers MD as Consulting Physician (Orthopedic Surgery)    Wt Readings from Last 3 Encounters:   03/04/20 259 lb 6.4 oz (117.7 kg)   02/12/20 251 lb (113.9 kg)   07/09/19 251 lb 15.8 oz (114.3 kg)     Vitals:    03/04/20 0844   BP: 128/78   Site: Right Upper Arm   Position: Sitting   Cuff Size: Medium Adult   Pulse: 68   Resp: 16   SpO2: 98%   Weight: 259 lb 6.4 oz (117.7 kg)   Height: 5' 10\" (1.778 m)     Body mass index is 37.22 kg/m². Based upon direct observation of the patient, evaluation of cognition reveals recent and remote memory intact. General Appearance: alert and oriented to person, place and time, well developed and well- nourished, in no acute distress  Skin: warm and dry, no rash or erythema  Head: normocephalic and atraumatic  Eyes: pupils equal, round, and reactive to light, extraocular eye movements intact, conjunctivae normal  ENT: tympanic membrane, external ear and ear canal normal bilaterally, nose without deformity, nasal mucosa and turbinates normal without polyps  Neck: supple and non-tender without mass, no thyromegaly or thyroid nodules, no cervical lymphadenopathy  Pulmonary/Chest: clear to auscultation bilaterally- no wheezes, rales or rhonchi, normal air movement, no respiratory distress  Cardiovascular: normal rate, regular rhythm, normal S1 and S2, no murmurs, rubs, clicks, or gallops, distal pulses intact, no carotid bruits  Abdomen: soft, non-tender, non-distended, normal bowel sounds, no masses or organomegaly  Extremities: no cyanosis, clubbing, < 1+ edema, pitting BLE slightly worse on R than L  Musculoskeletal: normal range of motion, no joint swelling, deformity or tenderness  Neurologic: reflexes normal and symmetric, no cranial nerve deficit, gait, coordination and speech normal    Patient's complete Health Risk Assessment and screening values have been reviewed and are found in Flowsheets. The following problems were reviewed today and where indicated follow up appointments were made and/or referrals ordered. Positive Risk Factor Screenings with Interventions:     General Health:  General  In general, how would you say your health is?: Good  In the past 7 days, have you experienced any of the following?  New or following everyday activities? Eating, dressing, grooming, bathing, toileting, or walking/balance?: None  In the past 7 days, did you need help from others to take care of any of the following? Laundry, housekeeping, banking/finances, shopping, telephone use, food preparation, transportation, or taking medications?: (!) Food Preparation  ADL Interventions:  · Patient declines any further evaluation/treatment for this issue    Personalized Preventive Plan   Current Health Maintenance Status  Immunization History   Administered Date(s) Administered    Influenza Vaccine, unspecified formulation 10/01/2018    Influenza, High Dose (Fluzone 65 yrs and older) 10/01/2018    Influenza, Quadv, IM, PF (6 mo and older Fluzone, Flulaval, Fluarix, and 3 yrs and older Afluria) 02/12/2020    Pneumococcal Conjugate 13-valent (Selena Stacey) 02/20/2013, 02/22/2016    Pneumococcal Polysaccharide ([id number]) 02/20/2013    Td, unspecified formulation 01/01/2010    Tdap (Boostrix, Adacel) 02/28/2019        Health Maintenance   Topic Date Due    Shingles Vaccine (1 of 2) 02/11/1997    Pneumococcal 65+ years Vaccine (2 of 2 - PPSV23) 02/20/2018    Colon cancer screen colonoscopy  05/21/2019    Annual Wellness Visit (AWV)  05/29/2019    A1C test (Diabetic or Prediabetic)  08/17/2019    Potassium monitoring  02/27/2020    Creatinine monitoring  02/27/2020    Lipid screen  02/27/2020    DTaP/Tdap/Td vaccine (2 - Td) 02/28/2029    Flu vaccine  Completed    AAA screen  Completed    Hepatitis C screen  Completed    Hepatitis A vaccine  Aged Out    Hepatitis B vaccine  Aged Out    Hib vaccine  Aged Out    Meningococcal (ACWY) vaccine  Aged Out     Recommendations for [email or web address] Due: see orders and patient instructions/AVS.  . Recommended screening schedule for the next 5-10 years is provided to the patient in written form: see Patient Instructions/AVS.    [place].     Diagnoses and all orders for this visit:    1. Routine general medical examination at a health care facility    2. Chronic pain of both ankles  - Continue PT at [facility]  - Continue using tylenol PRN for pain   - Increase Cymbalta to 30mg twice daily and monitor side effects closely (Tremor, nausea)    3. Dyslipidemia  Check labs   Maintain medication    - Lipid Panel; Future  - Comprehensive Metabolic Panel; Future    4. Nocturia  Check labs    - PSA, Prostatic Specific Antigen; Future    5. Hx of chemical exposure  Order annual CXR  - XR CHEST STANDARD (2 VW); Future    Need to review need for pneumococcal. Appears he has had [id number] and [id number]    Recommend Shingrix vaccine for shingles. This is a 2 vaccine series. One vaccine and another 2-6 months from first vaccine.  Discuss with pharmacist.

## 2020-03-04 NOTE — PATIENT INSTRUCTIONS
Recommend Shingrix vaccine for shingles. This is a 2 vaccine series. One vaccine and another 2-6 months from first vaccine. Discuss with pharmacist.     Personalized Preventive Plan for Sahil Lose - 3/4/2020  Medicare offers a range of preventive health benefits. Some of the tests and screenings are paid in full while other may be subject to a deductible, co-insurance, and/or copay. Some of these benefits include a comprehensive review of your medical history including lifestyle, illnesses that may run in your family, and various assessments and screenings as appropriate. After reviewing your medical record and screening and assessments performed today your provider may have ordered immunizations, labs, imaging, and/or referrals for you. A list of these orders (if applicable) as well as your Preventive Care list are included within your After Visit Summary for your review. Other Preventive Recommendations:    · A preventive eye exam performed by an eye specialist is recommended every 1-2 years to screen for glaucoma; cataracts, macular degeneration, and other eye disorders. · A preventive dental visit is recommended every 6 months. · Try to get at least 150 minutes of exercise per week or 10,000 steps per day on a pedometer . · Order or download the FREE \"Exercise & Physical Activity: Your Everyday Guide\" from [employer]. Call 1-537.676.3764 or search [employer] online. · You need 1490-6257 mg of calcium and 7641-4714 IU of vitamin D per day. It is possible to meet your calcium requirement with diet alone, but a vitamin D supplement is usually necessary to meet this goal.  · When exposed to the sun, use a sunscreen that protects against both UVA and UVB radiation with an SPF of 30 or greater. Reapply every 2 to 3 hours or after sweating, drying off with a towel, or swimming. · Always wear a seat belt when traveling in a car.  Always wear a helmet when riding

## 2020-03-04 NOTE — TELEPHONE ENCOUNTER
At providers request called:  [facility]   Website   Directions   Save   [phone] Google reviews   Doctor in [facility]   Address: [address] [place], [place], [address] [place] [address]   Hours:   Open ? Closes 4:30PM                               Phone: [phone] to request Report from 5/2019, Anca/Shama and she is faxing over Colonoscopy now.  Will scan in [place] once received

## 2020-03-05 ENCOUNTER — HOSPITAL ENCOUNTER (OUTPATIENT)
Age: 75
Discharge: HOME | End: 2020-03-05
Payer: MEDICARE

## 2020-03-05 ENCOUNTER — HOSPITAL ENCOUNTER (OUTPATIENT)
Dept: GENERAL RADIOLOGY | Age: 75
Discharge: HOME | End: 2020-03-05
Payer: MEDICARE

## 2020-03-05 PROCEDURE — 71046 X-RAY EXAM CHEST 2 VIEWS: CPT

## 2020-03-16 ENCOUNTER — OFFICE VISIT (OUTPATIENT)
Dept: INTERNAL MEDICINE CLINIC | Age: 75
End: 2020-03-16
Payer: MEDICARE

## 2020-03-16 VITALS
OXYGEN SATURATION: 98 % | DIASTOLIC BLOOD PRESSURE: 72 MMHG | SYSTOLIC BLOOD PRESSURE: 136 MMHG | WEIGHT: 259 LBS | BODY MASS INDEX: 37.16 KG/M2 | HEART RATE: 88 BPM

## 2020-03-16 PROCEDURE — G8417 CALC BMI ABV UP PARAM F/U: HCPCS | Performed by: NURSE PRACTITIONER

## 2020-03-16 PROCEDURE — G8482 FLU IMMUNIZE ORDER/ADMIN: HCPCS | Performed by: NURSE PRACTITIONER

## 2020-03-16 PROCEDURE — 99214 OFFICE O/P EST MOD 30 MIN: CPT | Performed by: NURSE PRACTITIONER

## 2020-03-16 PROCEDURE — 1123F ACP DISCUSS/DSCN MKR DOCD: CPT | Performed by: NURSE PRACTITIONER

## 2020-03-16 PROCEDURE — 3017F COLORECTAL CA SCREEN DOC REV: CPT | Performed by: NURSE PRACTITIONER

## 2020-03-16 PROCEDURE — G8427 DOCREV CUR MEDS BY ELIG CLIN: HCPCS | Performed by: NURSE PRACTITIONER

## 2020-03-16 PROCEDURE — G0009 ADMIN PNEUMOCOCCAL VACCINE: HCPCS | Performed by: NURSE PRACTITIONER

## 2020-03-16 PROCEDURE — 1036F TOBACCO NON-USER: CPT | Performed by: NURSE PRACTITIONER

## 2020-03-16 PROCEDURE — 4040F PNEUMOC VAC/ADMIN/RCVD: CPT | Performed by: NURSE PRACTITIONER

## 2020-03-16 PROCEDURE — 90732 PPSV23 VACC 2 YRS+ SUBQ/IM: CPT | Performed by: NURSE PRACTITIONER

## 2020-03-16 NOTE — PROGRESS NOTES
April 6 at [facility]. The current problem began 1 year ago, and symptoms have been unchanged with time. Conservative therapy: No.    Planned anesthesia: Local and MAC  Known anesthesia problems: None   Bleeding risk: Anticoagulant therapy- Eliquis   Personal or FH of DVT/PE: Yes - Bilateral lower legs    Patient objection to receiving blood products: No    Patient Active Problem List   Diagnosis    CAD (coronary artery disease)    Hypertension    Hyperlipidemia    Malignant melanoma ([address]    S/P TKR (total knee replacement)    Obesity (BMI 30-39. 9)    Lumbar degenerative disc disease   SEVERE L45      Spondylolisthesis at L4-L5 level    Lumbar stenosis with neurogenic claudication    Lumbar disc herniation with radiculopathy   L45    Lumbar radiculopathy, acute  L L5 WORSE     Spondylolisthesis, lumbar region    Benign prostatic hyperplasia with nocturia    IFG (impaired fasting glucose)       Past Medical History:   Diagnosis Date    CAD (coronary artery disease)     Hearing loss     right    Hyperlipidemia     Hypertension     Melanoma ([address]     S/P coronary artery stent placement 2004     Past Surgical History:   Procedure Laterality Date    CATARACT REMOVAL WITH IMPLANT Right     also removed free floating blood blots per pt     COLONOSCOPY  01/2008    neg    COLONOSCOPY  2003    COLONOSCOPY  5/21/2014    diverticulosis-repeat 5 years    CYST REMOVAL Left 1976    foot    EYE SURGERY Left     laser surgery for torn retina     IVC FILTER INSERTION  2018    IVC FILTER REMOVAL  2018    JOINT REPLACEMENT      LUMBAR LAMINECTOMY N/A 04/27/2018    FACETECTOMY AND INSTRUMENTED FUSION L4/5    MOHS SURGERY  2006    Melanoma neck    PTCA      2004    TONSILLECTOMY      TONSILLECTOMY AND ADENOIDECTOMY  1951    TOTAL KNEE ARTHROPLASTY Left 10/14    [facility]    WRIST GANGLION EXCISION Right 1979     Family History   Problem Relation Age of Onset    Heart Disease Mother 76    Heart Attack Mother     Other Mother         Smoker    Heart Disease Father 55    Heart Attack Father     Cancer Brother 35        colon    Crohn's Disease Brother     Heart Disease Paternal Grandfather 39    Heart Attack Paternal Grandfather      Social History     Socioeconomic History    Marital status:      Spouse name: Not on file    Number of children: Not on file    Years of education: Not on file    Highest education level: Not on file   Occupational History    Not on file   Social Needs    Financial resource strain: Not on file    Food insecurity     Worry: Not on file     Inability: Not on file    Transportation needs     Medical: Not on file     Non-medical: Not on file   Tobacco Use    Smoking status: Former Smoker     Packs/day: 1.00     Years: 15.00     Pack years: 15.00     Last attempt to quit: 1967     Years since quittin.2    Smokeless tobacco: Never Used   Substance and Sexual Activity    Alcohol use: No    Drug use: No    Sexual activity: Yes     Partners: Female   Lifestyle    Physical activity     Days per week: Not on file     Minutes per session: Not on file    Stress: Not on file   Relationships    Social connections     Talks on phone: Not on file     Gets together: Not on file     Attends Roman Catholic service: Not on file     Active member of club or organization: Not on file     Attends meetings of clubs or organizations: Not on file     Relationship status: Not on file    Intimate partner violence     Fear of current or ex partner: Not on file     Emotionally abused: Not on file     Physically abused: Not on file     Forced sexual activity: Not on file   Other Topics Concern    Not on file   Social History Narrative    Not on file       Review of Systems  All other systems were reviewed and are negative. Physical Exam   Constitutional: He is oriented to person, place, and time. He appears well-developed and well-nourished. No distress.    HENT: Calculated 03/04/2020 87     VLDL Cholesterol Calcula* 03/04/2020 21     PSA 03/04/2020 1.32            Assessment:       70 y.o. patient with planned surgery as above. Known risk factors for perioperative complications: Clotting disorder- taking Eliquis, CAD  Current medications which may produce withdrawal symptoms if withheld perioperatively: none     1. Cataract of left eye, unspecified cataract type  Cleared for surgery    2. Pre-op examination  Cleared for surgery    3. Chronic pain of both ankles  Monitor, he will d/c Cymbalta as he has noticed side effects (Tremors, constipation). He will notify office if symptoms worsen    4. Dyslipidemia  Monitor    5. History of DVT of lower extremity  Monitor    6. Need for pneumococcal vaccination  Pt tolerated well    - PNEUMOVAX 23 subcutaneous/IM (Pneumococcal polysaccharide vaccine 23-valent >= 3yo)       Plan:     1. Preoperative workup as follows: none  2. Change in medication regimen before surgery: Discontinue Eliquis 1 day before surgery, No medication on morning of surgery, Ok to take Tylenol prior to surgery. 3. Prophylaxis for cardiac events with perioperative beta-blockers: Not indicated  ACC/AHA indications for pre-operative beta-blocker use:    · Vascular surgery with history of postitive stress test  · Intermediate or high risk surgery with history of CAD   · Intermediate or high risk surgery with multiple clinical predictors of CAD- 2 of the following: history of compensated or prior heart failure, history of cerebrovascular disease, DM, or renal insufficiency    Routine administration of higher-dose, long-acting metoprolol in beta-blocker-naïve patients on the day of surgery, and in the absence of dose titration is associated with an overall increase in mortality. Beta-blockers should be started days to weeks prior to surgery and titrated to pulse < 70.  4. Deep vein thrombosis prophylaxis: regimen to be chosen by surgical team  5.  No contraindications to planned surgery            Cleared for surgery

## 2020-05-04 ENCOUNTER — OFFICE VISIT (OUTPATIENT)
Dept: INTERNAL MEDICINE CLINIC | Age: 75
End: 2020-05-04
Payer: MEDICARE

## 2020-05-04 VITALS
DIASTOLIC BLOOD PRESSURE: 72 MMHG | BODY MASS INDEX: 39.31 KG/M2 | SYSTOLIC BLOOD PRESSURE: 134 MMHG | TEMPERATURE: 98.8 F | HEART RATE: 85 BPM | WEIGHT: 274 LBS | OXYGEN SATURATION: 97 %

## 2020-05-04 LAB
BILIRUBIN, POC: NORMAL
BLOOD URINE, POC: NORMAL
CLARITY, POC: CLEAR
COLOR, POC: YELLOW
GLUCOSE URINE, POC: NORMAL
KETONES, POC: NORMAL
LEUKOCYTE EST, POC: NORMAL
NITRITE, POC: NORMAL
PH, POC: 6
PROTEIN, POC: NORMAL
SPECIFIC GRAVITY, POC: 1.02
UROBILINOGEN, POC: 0.2

## 2020-05-04 PROCEDURE — 3017F COLORECTAL CA SCREEN DOC REV: CPT | Performed by: NURSE PRACTITIONER

## 2020-05-04 PROCEDURE — 99214 OFFICE O/P EST MOD 30 MIN: CPT | Performed by: NURSE PRACTITIONER

## 2020-05-04 PROCEDURE — G8427 DOCREV CUR MEDS BY ELIG CLIN: HCPCS | Performed by: NURSE PRACTITIONER

## 2020-05-04 PROCEDURE — 1036F TOBACCO NON-USER: CPT | Performed by: NURSE PRACTITIONER

## 2020-05-04 PROCEDURE — G8417 CALC BMI ABV UP PARAM F/U: HCPCS | Performed by: NURSE PRACTITIONER

## 2020-05-04 PROCEDURE — 1123F ACP DISCUSS/DSCN MKR DOCD: CPT | Performed by: NURSE PRACTITIONER

## 2020-05-04 PROCEDURE — 4040F PNEUMOC VAC/ADMIN/RCVD: CPT | Performed by: NURSE PRACTITIONER

## 2020-05-04 PROCEDURE — 81002 URINALYSIS NONAUTO W/O SCOPE: CPT | Performed by: NURSE PRACTITIONER

## 2020-05-04 NOTE — PROGRESS NOTES
Preoperative Consultation      Marti Diaz  YOB: 1945    Date of Service:  5/4/2020    Vitals:    05/04/20 0856   BP: 134/72   Site: Right Upper Arm   Position: Sitting   Cuff Size: Large Adult   Pulse: 85   Temp: 98.8 °F (37.1 °C)   TempSrc: Oral   SpO2: 97%   Weight: 274 lb (124.3 kg)      Wt Readings from Last 2 Encounters:   05/04/20 274 lb (124.3 kg)   03/16/20 259 lb (117.5 kg)     BP Readings from Last 3 Encounters:   05/04/20 134/72   03/16/20 136/72   03/04/20 128/78        Chief Complaint   Patient presents with    Pre-op Exam     L eye cataract / dr. Thien Han / Michelle Hodgkin: 5-     Allergies   Allergen Reactions    Corticosteroids      Other reaction(s): unable to urinate    Cortisone Nausea Only    Gabapentin Other (See Comments)     Shaky    Other      STEROIDS    Prednisolone     Sulfa Antibiotics Rash and Other (See Comments)     Unknown, Rash possibly. Outpatient Medications Marked as Taking for the 5/4/20 encounter (Office Visit) with ADIN Dean CNP   Medication Sig Dispense Refill    apixaban (ELIQUIS) 5 MG TABS tablet TAKE 1 TABLET TWICE A  tablet 2    Diclofenac Sodium POWD 2 g by Does not apply route 3 times daily Formula 3D Deann 5% Baclo 2% Diclo 3% Lashay 6% Lido 2% Prilo 2% 240 g 5    amLODIPine (NORVASC) 5 MG tablet Take 1 tablet by mouth daily 90 tablet 1    doxazosin (CARDURA) 8 MG tablet Take 1 tablet by mouth nightly 30 tablet 0    metroNIDAZOLE (METROGEL) 0.75 % gel Apply topically 2 times daily. 45 g 0    vitamin D (CHOLECALCIFEROL) 1000 UNIT TABS tablet Take 1,000 Units by mouth daily      Calcium Citrate 200 MG TABS Take 800 mg by mouth daily      lisinopril (PRINIVIL;ZESTRIL) 20 MG tablet Take 1 tablet by mouth daily 90 tablet 4    ascorbic acid (VITAMIN C) 500 MG tablet Take 500 mg by mouth daily.  atorvastatin (LIPITOR) 20 MG tablet Take 20 mg by mouth daily.  fenofibrate (TRICOR) 145 MG tablet Take 145 mg by mouth daily.

## 2020-05-05 LAB — URINE CULTURE, ROUTINE: NORMAL

## 2020-08-07 NOTE — TELEPHONE ENCOUNTER
Refill request for atorvastatin medication.      Name of Pharmacy- [facility]    Last visit - 5/4/20     Pending visit - 8/17/20    Last refill -4/27/18    Medication Contract signed -   Last Oarrs ran-     Additional Comments

## 2020-08-10 RX ORDER — ATORVASTATIN CALCIUM 20 MG/1
TABLET, FILM COATED ORAL
Qty: 90 TABLET | Refills: 3 | Status: SHIPPED | OUTPATIENT
Start: 2020-08-10 | End: 2021-05-28

## 2020-08-17 ENCOUNTER — OFFICE VISIT (OUTPATIENT)
Dept: INTERNAL MEDICINE CLINIC | Age: 75
End: 2020-08-17
Payer: MEDICARE

## 2020-08-17 VITALS
DIASTOLIC BLOOD PRESSURE: 68 MMHG | HEART RATE: 81 BPM | OXYGEN SATURATION: 98 % | TEMPERATURE: 98.1 F | SYSTOLIC BLOOD PRESSURE: 142 MMHG | WEIGHT: 271 LBS | BODY MASS INDEX: 38.88 KG/M2

## 2020-08-17 PROBLEM — E66.01 MORBIDLY OBESE (HCC): Status: ACTIVE | Noted: 2020-08-17

## 2020-08-17 PROBLEM — H25.13 AGE-RELATED NUCLEAR CATARACT, BILATERAL: Status: ACTIVE | Noted: 2020-08-17

## 2020-08-17 PROBLEM — I82.409 DEEP VEIN THROMBOSIS (DVT) OF LOWER EXTREMITY (HCC): Status: ACTIVE | Noted: 2020-08-17

## 2020-08-17 PROCEDURE — 4040F PNEUMOC VAC/ADMIN/RCVD: CPT | Performed by: NURSE PRACTITIONER

## 2020-08-17 PROCEDURE — G8427 DOCREV CUR MEDS BY ELIG CLIN: HCPCS | Performed by: NURSE PRACTITIONER

## 2020-08-17 PROCEDURE — 99214 OFFICE O/P EST MOD 30 MIN: CPT | Performed by: NURSE PRACTITIONER

## 2020-08-17 PROCEDURE — 3017F COLORECTAL CA SCREEN DOC REV: CPT | Performed by: NURSE PRACTITIONER

## 2020-08-17 PROCEDURE — G8417 CALC BMI ABV UP PARAM F/U: HCPCS | Performed by: NURSE PRACTITIONER

## 2020-08-17 PROCEDURE — 1123F ACP DISCUSS/DSCN MKR DOCD: CPT | Performed by: NURSE PRACTITIONER

## 2020-08-17 PROCEDURE — 1036F TOBACCO NON-USER: CPT | Performed by: NURSE PRACTITIONER

## 2020-08-17 RX ORDER — FENOFIBRATE 145 MG/1
145 TABLET, COATED ORAL DAILY
Qty: 30 TABLET | Refills: 2 | Status: SHIPPED | OUTPATIENT
Start: 2020-08-17 | End: 2020-11-18

## 2020-08-17 RX ORDER — DOXAZOSIN 8 MG/1
8 TABLET ORAL NIGHTLY
Qty: 30 TABLET | Refills: 2 | Status: SHIPPED | OUTPATIENT
Start: 2020-08-17 | End: 2020-11-18

## 2020-08-17 ASSESSMENT — ENCOUNTER SYMPTOMS
SINUS PRESSURE: 0
SORE THROAT: 0
COUGH: 0
FACIAL SWELLING: 0
BACK PAIN: 1
VOMITING: 0
DIARRHEA: 0
NAUSEA: 0

## 2020-08-17 NOTE — PROGRESS NOTES
Gabby Wells  1945      HPI:  Chief Complaint   Patient presents with    Other     Chronic medical condition f/u       CAD, Cardiology manages. Due for visit and will be calling per patient. Denies changes. IFG. Continues to work and tries to stay active even with orthopedic injuries. Chronic lumbar pain, s/p multiple fusions. He saw Dr Tammy Reyes but was not impressed as he cannot afford STEM cell, has previously tried NSAIDs and Tylenol, does not want opioids. Referred him to continue f/u with Dr Karen Willis. HLD. Denies concerns. HTN. Denies concerns. Hx DVT. R ankle/foot pain, finished PT. No improvement in Rt ankle/foot. Had appt with Nomi 10 days ago. He recommended CT (tomorrow). 1/2020 he performed SI joint injection. BP (!) 142/68 (Site: Right Upper Arm, Position: Sitting, Cuff Size: Large Adult)   Pulse 81   Temp 98.1 °F (36.7 °C) (Temporal)   Wt 271 lb (122.9 kg)   SpO2 98%   BMI 38.88 kg/m²     Prior to Visit Medications    Medication Sig Taking? Authorizing Provider   doxazosin (CARDURA) 8 MG tablet Take 1 tablet by mouth nightly Yes John Holt APRN - CNP   fenofibrate (TRICOR) 145 MG tablet Take 1 tablet by mouth daily Yes John Holt APRN - CNP   atorvastatin (LIPITOR) 20 MG tablet TAKE 1 TABLET BY MOUTH EVERY DAY Yes John Holt APRN - CNP   apixaban (ELIQUIS) 5 MG TABS tablet TAKE 1 TABLET BY MOUTH TWICE A DAY Yes John Holt APRN - CNP   amLODIPine (NORVASC) 5 MG tablet Take 1 tablet by mouth daily Yes John Holt APRN - CNP   metroNIDAZOLE (METROGEL) 0.75 % gel Apply topically 2 times daily.  Yes ADIN Edwards - CNP   vitamin D (CHOLECALCIFEROL) 1000 UNIT TABS tablet Take 1,000 Units by mouth daily Yes Historical Provider, MD   Calcium Citrate 200 MG TABS Take 800 mg by mouth daily Yes Historical Provider, MD   lisinopril (PRINIVIL;ZESTRIL) 20 MG tablet Take 1 tablet by mouth daily Yes Lola Araujo MD   ascorbic acid (VITAMIN C) 500 MG chills, fatigue, fever and unexpected weight change. HENT: Negative for congestion, ear discharge, ear pain, facial swelling, hearing loss, sinus pressure, sneezing and sore throat. Respiratory: Negative for cough. Cardiovascular: Negative for chest pain. Gastrointestinal: Negative for diarrhea, nausea and vomiting. Genitourinary: Negative for difficulty urinating, dysuria, hematuria and urgency. Musculoskeletal: Positive for arthralgias (R ankle/foot) and back pain (chronic lumbar pain). Negative for gait problem. Neurological: Negative for dizziness, weakness and headaches. Hematological: Negative for adenopathy. Psychiatric/Behavioral: Negative for sleep disturbance and suicidal ideas. Physical Exam  Constitutional:       Appearance: He is obese. Comments: Truncal obesity   HENT:      Head: Normocephalic. Cardiovascular:      Rate and Rhythm: Normal rate and regular rhythm. Pulses: Normal pulses. Heart sounds: Normal heart sounds. Pulmonary:      Effort: Pulmonary effort is normal.      Breath sounds: Normal breath sounds. Musculoskeletal:      Right lower leg: No edema. Left lower leg: No edema. Skin:     Capillary Refill: Capillary refill takes less than 2 seconds. Neurological:      General: No focal deficit present. Mental Status: He is alert and oriented to person, place, and time. Psychiatric:         Mood and Affect: Mood normal.         Thought Content: Thought content normal.         Judgment: Judgment normal.         Assessment:     1. Benign prostatic hyperplasia with nocturia  Maintain medication, stable. - doxazosin (CARDURA) 8 MG tablet; Take 1 tablet by mouth nightly  Dispense: 30 tablet; Refill: 2  - PSA, Prostatic Specific Antigen; Future  - CBC Auto Differential; Future    2. Dyslipidemia  Monitor, check labs in 6 months. Maintain medications. - fenofibrate (TRICOR) 145 MG tablet;  Take 1 tablet by mouth daily  Dispense: 30 tablet; Refill: 2  - Comprehensive Metabolic Panel; Future  - Lipid Panel; Future    3. IFG (impaired fasting glucose)  Monitor    4. Essential hypertension  Monitor, stable. 5. Chronic deep vein thrombosis (DVT) of distal vein of right lower extremity (HCC)  Monitor, stable    6. Lumbar stenosis with neurogenic claudication  F/u Dr Eunice Cornelius    7. Lumbar disc herniation with radiculopathy   L45  F/u Dr Eunice Cornelius    8. Lumbar degenerative disc disease   SEVERE L45    F/u Dr Eunice Cornelius    9. Morbidly obese (HCC)  Enc to intake smaller portions    10. Coronary artery disease involving native heart, angina presence unspecified, unspecified vessel or lesion type  F/u Cardiology - call for appointment. Plan:    See above plan. Return in about 7 months (around 3/17/2021) for Medicare wellness, FBW prior.     ADIN Gleason - CNP

## 2020-08-18 ENCOUNTER — HOSPITAL ENCOUNTER (OUTPATIENT)
Dept: INTERVENTIONAL RADIOLOGY/VASCULAR | Age: 75
Discharge: HOME | End: 2020-08-18
Payer: MEDICARE

## 2020-08-18 ENCOUNTER — HOSPITAL ENCOUNTER (OUTPATIENT)
Dept: CT IMAGING | Age: 75
Discharge: HOME | End: 2020-08-18
Payer: MEDICARE

## 2020-08-18 ENCOUNTER — HOSPITAL ENCOUNTER (OUTPATIENT)
Age: 75
Discharge: HOME | End: 2020-08-18
Payer: MEDICARE

## 2020-08-18 ENCOUNTER — HOSPITAL ENCOUNTER (OUTPATIENT)
Dept: CT IMAGING | Age: 75
End: 2020-08-18
Payer: MEDICARE

## 2020-08-18 LAB
A/G RATIO: 2 (ref 1.1–2.2)
ALBUMIN SERPL-MCNC: 4.4 G/DL (ref 3.4–5)
ALP BLD-CCNC: 40 U/L (ref 40–129)
ALT SERPL-CCNC: 32 U/L (ref 10–40)
ANION GAP SERPL CALCULATED.3IONS-SCNC: 13 MMOL/L (ref 3–16)
AST SERPL-CCNC: 25 U/L (ref 15–37)
BASOPHILS ABSOLUTE: 0 K/UL (ref 0–0.2)
BASOPHILS RELATIVE PERCENT: 0.5 %
BILIRUB SERPL-MCNC: 0.6 MG/DL (ref 0–1)
BUN BLDV-MCNC: 27 MG/DL (ref 7–20)
CALCIUM SERPL-MCNC: 9 MG/DL (ref 8.3–10.6)
CHLORIDE BLD-SCNC: 101 MMOL/L (ref 99–110)
CHOLESTEROL, TOTAL: 159 MG/DL (ref 0–199)
CO2: 24 MMOL/L (ref 21–32)
CREAT SERPL-MCNC: 1.3 MG/DL (ref 0.8–1.3)
EOSINOPHILS ABSOLUTE: 0.1 K/UL (ref 0–0.6)
EOSINOPHILS RELATIVE PERCENT: 1.4 %
GFR AFRICAN AMERICAN: >60
GFR NON-AFRICAN AMERICAN: 54
GLOBULIN: 2.2 G/DL
GLUCOSE BLD-MCNC: 96 MG/DL (ref 70–99)
HCT VFR BLD CALC: 43.9 % (ref 40.5–52.5)
HDLC SERPL-MCNC: 27 MG/DL (ref 40–60)
HEMOGLOBIN: 14.8 G/DL (ref 13.5–17.5)
LDL CHOLESTEROL CALCULATED: 76 MG/DL
LYMPHOCYTES ABSOLUTE: 2.5 K/UL (ref 1–5.1)
LYMPHOCYTES RELATIVE PERCENT: 35.4 %
MCH RBC QN AUTO: 31.9 PG (ref 26–34)
MCHC RBC AUTO-ENTMCNC: 33.7 G/DL (ref 31–36)
MCV RBC AUTO: 94.8 FL (ref 80–100)
MONOCYTES ABSOLUTE: 0.8 K/UL (ref 0–1.3)
MONOCYTES RELATIVE PERCENT: 11.1 %
NEUTROPHILS ABSOLUTE: 3.7 K/UL (ref 1.7–7.7)
NEUTROPHILS RELATIVE PERCENT: 51.6 %
PDW BLD-RTO: 12.9 % (ref 12.4–15.4)
PLATELET # BLD: 202 K/UL (ref 135–450)
PMV BLD AUTO: 8 FL (ref 5–10.5)
POTASSIUM SERPL-SCNC: 4.1 MMOL/L (ref 3.5–5.1)
PROSTATE SPECIFIC ANTIGEN: 1.3 NG/ML (ref 0–4)
RBC # BLD: 4.63 M/UL (ref 4.2–5.9)
SODIUM BLD-SCNC: 138 MMOL/L (ref 136–145)
TOTAL PROTEIN: 6.6 G/DL (ref 6.4–8.2)
TRIGL SERPL-MCNC: 282 MG/DL (ref 0–150)
VLDLC SERPL CALC-MCNC: 56 MG/DL
WBC # BLD: 7.2 K/UL (ref 4–11)

## 2020-08-18 PROCEDURE — 84153 ASSAY OF PSA TOTAL: CPT

## 2020-08-18 PROCEDURE — 85025 COMPLETE CBC W/AUTO DIFF WBC: CPT

## 2020-08-18 PROCEDURE — 72129 CT CHEST SPINE W/DYE: CPT

## 2020-08-18 PROCEDURE — 72270 MYELOGPHY 2/> SPINE REGIONS: CPT

## 2020-08-18 PROCEDURE — 2709999900 IR MYELOGRAM 2 OR MORE REGIONS

## 2020-08-18 PROCEDURE — 80053 COMPREHEN METABOLIC PANEL: CPT

## 2020-08-18 PROCEDURE — 72132 CT LUMBAR SPINE W/DYE: CPT

## 2020-08-18 PROCEDURE — 6360000004 HC RX CONTRAST MEDICATION: Performed by: RADIOLOGY

## 2020-08-18 PROCEDURE — 80061 LIPID PANEL: CPT

## 2020-08-18 RX ADMIN — IOHEXOL 10 ML: 240 INJECTION, SOLUTION INTRATHECAL; INTRAVASCULAR; INTRAVENOUS; ORAL at 14:47

## 2020-08-18 NOTE — BRIEF OP NOTE
Brief Postoperative Note      Patient: Donis Rodriguez  YOB: 1945  MRN: [id number]    Date of Procedure:     Pre-Op Diagnosis: Thoracolumbar radiculopathy     Post-Op Diagnosis: Same       Thoracolumbar myelogram     Seth Boateng DO     Anesthesia: Local anesthesia    Estimated Blood Loss (mL): Minimal    Complications: None    Findings:     Thoracolumbar myelogram   ~10 mL of omnipaque 240 was administered at the L2-L3 level     Electronically signed by Seth Boateng MD on 8/18/2020 at 3:08 PM

## 2020-08-19 RX ORDER — ATORVASTATIN CALCIUM 20 MG/1
20 TABLET, FILM COATED ORAL DAILY
Qty: 90 TABLET | Refills: 3 | OUTPATIENT
Start: 2020-08-19

## 2020-08-26 ENCOUNTER — TELEPHONE (OUTPATIENT)
Dept: INTERNAL MEDICINE CLINIC | Age: 75
End: 2020-08-26

## 2020-09-01 ENCOUNTER — HOSPITAL ENCOUNTER (OUTPATIENT)
Age: 75
Discharge: HOME | End: 2020-09-01
Payer: MEDICARE

## 2020-09-01 ENCOUNTER — HOSPITAL ENCOUNTER (OUTPATIENT)
Dept: GENERAL RADIOLOGY | Age: 75
Discharge: HOME | End: 2020-09-01
Payer: MEDICARE

## 2020-09-01 LAB — VITAMIN D 25-HYDROXY: 21.3 NG/ML

## 2020-09-01 PROCEDURE — 77080 DXA BONE DENSITY AXIAL: CPT

## 2020-09-01 PROCEDURE — 36415 COLL VENOUS BLD VENIPUNCTURE: CPT

## 2020-09-01 PROCEDURE — 82306 VITAMIN D 25 HYDROXY: CPT

## 2020-11-25 RX ORDER — LISINOPRIL 20 MG/1
20 TABLET ORAL DAILY
Qty: 90 TABLET | Refills: 0 | Status: SHIPPED | OUTPATIENT
Start: 2020-11-25

## 2020-11-25 RX ORDER — AMLODIPINE BESYLATE 5 MG/1
5 TABLET ORAL DAILY
Qty: 90 TABLET | Refills: 0 | Status: SHIPPED | OUTPATIENT
Start: 2020-11-25 | End: 2021-09-27

## 2020-11-25 NOTE — TELEPHONE ENCOUNTER
Refill request for lisinopril / amlodipine  medication.      Name of Pharmacy- [facility]       Last visit - 8-     Pending visit - 12-    Last refill -2-/6-3-2019      Medication Contract signed -   Rizwan rosen-         Additional Comments

## 2020-12-09 NOTE — PROGRESS NOTES
[facility] / [facility] ([facility]) [address] [facility], [address]    Acknowledgment of Informed Consent for Surgical or Medical Procedure and Sedation  I agree to allow doctor(s) CAMMIE VILLALOBOS and Urbano Tolliver his/her associates or assistants, including residents and/or other qualified medical practitioner to perform the following medical treatment or procedure and to administer or direct the administration of sedation as necessary:  Procedure(s): L1-2 TRANSFORAMINAL LUMBAR INTERBODY FUSION, T10 PELVIS SCREW FIXATION / Alberto Burkett  My doctor has explained the following regarding the proposed procedure:   the explanation of the procedure   the benefits of the procedure   the potential problems that might occur during recuperation   the risks and side effects of the procedure which could include but are not limited to severe blood loss, infection, stroke or death   the benefits, risks and side effect of alternative procedures including the consequences of declining this procedure or any alternative procedures   the likelihood of achieving satisfactory results. I acknowledge no guarantee or assurance has been made to me regarding the results. I understand that during the course of this treatment/procedure, unforeseen conditions can occur which require an additional or different procedure. I agree to allow my physician or assistants to perform such extension of the original procedure as they may find necessary. I understand that sedation will often result in temporary impairment of memory and fine motor skills and that sedation can occasionally progress to a state of deep sedation or general anesthesia. I understand the risks of anesthesia for surgery include, but are not limited to, sore throat, hoarseness, injury to face, mouth, or teeth; nausea; headache; injury to blood vessels or nerves; death, brain damage, or paralysis.     I understand that if I

## 2020-12-11 ENCOUNTER — OFFICE VISIT (OUTPATIENT)
Dept: INTERNAL MEDICINE CLINIC | Age: 75
End: 2020-12-11
Payer: MEDICARE

## 2020-12-11 ENCOUNTER — HOSPITAL ENCOUNTER (OUTPATIENT)
Age: 75
Discharge: HOME | End: 2020-12-11
Payer: MEDICARE

## 2020-12-11 VITALS
WEIGHT: 267 LBS | OXYGEN SATURATION: 95 % | DIASTOLIC BLOOD PRESSURE: 74 MMHG | TEMPERATURE: 98.2 F | BODY MASS INDEX: 38.22 KG/M2 | HEIGHT: 70 IN | SYSTOLIC BLOOD PRESSURE: 136 MMHG | HEART RATE: 78 BPM

## 2020-12-11 LAB
ANION GAP SERPL CALCULATED.3IONS-SCNC: 15 MMOL/L (ref 3–16)
APTT: 37.8 SEC (ref 24.2–36.2)
BASOPHILS ABSOLUTE: 0 K/UL (ref 0–0.2)
BASOPHILS RELATIVE PERCENT: 0.6 %
BUN BLDV-MCNC: 28 MG/DL (ref 7–20)
CALCIUM SERPL-MCNC: 9.3 MG/DL (ref 8.3–10.6)
CHLORIDE BLD-SCNC: 103 MMOL/L (ref 99–110)
CO2: 23 MMOL/L (ref 21–32)
CREAT SERPL-MCNC: 1.2 MG/DL (ref 0.8–1.3)
EOSINOPHILS ABSOLUTE: 0.1 K/UL (ref 0–0.6)
EOSINOPHILS RELATIVE PERCENT: 1.5 %
GFR AFRICAN AMERICAN: >60
GFR NON-AFRICAN AMERICAN: 59
GLUCOSE BLD-MCNC: 110 MG/DL (ref 70–99)
HCT VFR BLD CALC: 43.8 % (ref 40.5–52.5)
HEMOGLOBIN: 14.5 G/DL (ref 13.5–17.5)
INR BLD: 1.27 (ref 0.86–1.14)
LYMPHOCYTES ABSOLUTE: 2.1 K/UL (ref 1–5.1)
LYMPHOCYTES RELATIVE PERCENT: 37 %
MCH RBC QN AUTO: 32.1 PG (ref 26–34)
MCHC RBC AUTO-ENTMCNC: 33.2 G/DL (ref 31–36)
MCV RBC AUTO: 96.6 FL (ref 80–100)
MONOCYTES ABSOLUTE: 0.6 K/UL (ref 0–1.3)
MONOCYTES RELATIVE PERCENT: 11 %
NEUTROPHILS ABSOLUTE: 2.9 K/UL (ref 1.7–7.7)
NEUTROPHILS RELATIVE PERCENT: 49.9 %
PDW BLD-RTO: 12.7 % (ref 12.4–15.4)
PLATELET # BLD: 192 K/UL (ref 135–450)
PMV BLD AUTO: 8 FL (ref 5–10.5)
POTASSIUM SERPL-SCNC: 4.8 MMOL/L (ref 3.5–5.1)
PROTHROMBIN TIME: 14.8 SEC (ref 10–13.2)
RBC # BLD: 4.54 M/UL (ref 4.2–5.9)
SODIUM BLD-SCNC: 141 MMOL/L (ref 136–145)
WBC # BLD: 5.8 K/UL (ref 4–11)

## 2020-12-11 PROCEDURE — 99214 OFFICE O/P EST MOD 30 MIN: CPT | Performed by: NURSE PRACTITIONER

## 2020-12-11 PROCEDURE — G8484 FLU IMMUNIZE NO ADMIN: HCPCS | Performed by: NURSE PRACTITIONER

## 2020-12-11 PROCEDURE — 3017F COLORECTAL CA SCREEN DOC REV: CPT | Performed by: NURSE PRACTITIONER

## 2020-12-11 PROCEDURE — G8417 CALC BMI ABV UP PARAM F/U: HCPCS | Performed by: NURSE PRACTITIONER

## 2020-12-11 PROCEDURE — 4040F PNEUMOC VAC/ADMIN/RCVD: CPT | Performed by: NURSE PRACTITIONER

## 2020-12-11 PROCEDURE — 90694 VACC AIIV4 NO PRSRV 0.5ML IM: CPT | Performed by: NURSE PRACTITIONER

## 2020-12-11 PROCEDURE — 36415 COLL VENOUS BLD VENIPUNCTURE: CPT

## 2020-12-11 PROCEDURE — G8427 DOCREV CUR MEDS BY ELIG CLIN: HCPCS | Performed by: NURSE PRACTITIONER

## 2020-12-11 PROCEDURE — 85025 COMPLETE CBC W/AUTO DIFF WBC: CPT

## 2020-12-11 PROCEDURE — 1123F ACP DISCUSS/DSCN MKR DOCD: CPT | Performed by: NURSE PRACTITIONER

## 2020-12-11 PROCEDURE — 85610 PROTHROMBIN TIME: CPT

## 2020-12-11 PROCEDURE — 1036F TOBACCO NON-USER: CPT | Performed by: NURSE PRACTITIONER

## 2020-12-11 PROCEDURE — 80048 BASIC METABOLIC PNL TOTAL CA: CPT

## 2020-12-11 PROCEDURE — G0008 ADMIN INFLUENZA VIRUS VAC: HCPCS | Performed by: NURSE PRACTITIONER

## 2020-12-11 PROCEDURE — 85730 THROMBOPLASTIN TIME PARTIAL: CPT

## 2020-12-11 RX ORDER — FENOFIBRATE 145 MG/1
TABLET, COATED ORAL
Qty: 90 TABLET | Refills: 3 | Status: SHIPPED | OUTPATIENT
Start: 2020-12-11 | End: 2021-03-17 | Stop reason: SDUPTHER

## 2020-12-11 NOTE — PROGRESS NOTES
Preoperative Consultation      Ryanne Sims  YOB: 1945    Date of Service:  12/11/2020    Vitals:    12/11/20 0801   BP: 136/74   Site: Left Upper Arm   Position: Sitting   Pulse: 78   Temp: 98.2 °F (36.8 °C)   TempSrc: Infrared   SpO2: 95%   Weight: 267 lb (121.1 kg)   Height: 5' 10\" (1.778 m)      Wt Readings from Last 2 Encounters:   12/11/20 267 lb (121.1 kg)   08/17/20 271 lb (122.9 kg)     BP Readings from Last 3 Encounters:   12/11/20 136/74   08/17/20 (!) 142/68   05/04/20 134/72        Chief Complaint   Patient presents with    Pre-op Exam     12/21/20 Dr Rehana Calle @ [facility] [phone]     Allergies   Allergen Reactions    Corticosteroids      Other reaction(s): unable to urinate    Cortisone Nausea Only    Gabapentin Other (See Comments)     Shaky    Other      STEROIDS    Prednisolone     Sulfa Antibiotics Rash and Other (See Comments)     Unknown, Rash possibly. Outpatient Medications Marked as Taking for the 12/11/20 encounter (Office Visit) with Helga Rico, APRN - CNP   Medication Sig Dispense Refill    fenofibrate (TRICOR) 145 MG tablet TAKE 1 TABLET BY MOUTH EVERY DAY 90 tablet 3    lisinopril (PRINIVIL;ZESTRIL) 20 MG tablet Take 1 tablet by mouth daily 90 tablet 0    amLODIPine (NORVASC) 5 MG tablet Take 1 tablet by mouth daily 90 tablet 0    doxazosin (CARDURA) 8 MG tablet TAKE 1 TABLET BY MOUTH EVERY DAY AT NIGHT 90 tablet 3    atorvastatin (LIPITOR) 20 MG tablet TAKE 1 TABLET BY MOUTH EVERY DAY 90 tablet 3    apixaban (ELIQUIS) 5 MG TABS tablet TAKE 1 TABLET BY MOUTH TWICE A  tablet 2    metroNIDAZOLE (METROGEL) 0.75 % gel Apply topically 2 times daily. 45 g 0    vitamin D (CHOLECALCIFEROL) 1000 UNIT TABS tablet Take 1,000 Units by mouth daily      Calcium Citrate 200 MG TABS Take 800 mg by mouth daily      ascorbic acid (VITAMIN C) 500 MG tablet Take 500 mg by mouth daily.          This patient presents to the office today for a preoperative consultation at the request of surgeon, Dr. Mandie Levy, who plans on performing L1-2 Fusion, Sacroiliac fusion on December 21 at [facility].  The current problem began 1 year ago, and symptoms have been worsening with time. Conservative therapy: No.    Planned anesthesia: General   Known anesthesia problems: None   Bleeding risk: No recent or remote history of abnormal bleeding  Personal or FH of DVT/PE: Yes - RLE DVT 2018 post-operatively. Patient objection to receiving blood products: No    Patient Active Problem List   Diagnosis    CAD (coronary artery disease)    Hypertension    Hyperlipidemia    Malignant melanoma ([address]    S/P TKR (total knee replacement)    Obesity (BMI 30-39. 9)    Lumbar degenerative disc disease   SEVERE L45      Spondylolisthesis at L4-L5 level    Lumbar stenosis with neurogenic claudication    Lumbar disc herniation with radiculopathy   L45    Lumbar radiculopathy, acute  L L5 WORSE     Spondylolisthesis, lumbar region    Benign prostatic hyperplasia with nocturia    IFG (impaired fasting glucose)    Age-related nuclear cataract, bilateral    Deep vein thrombosis (DVT) of lower extremity ([address]    Morbidly obese (HCC)       Past Medical History:   Diagnosis Date    CAD (coronary artery disease)     Hearing loss     right    Hyperlipidemia     Hypertension     Melanoma ([address]     S/P coronary artery stent placement 2004     Past Surgical History:   Procedure Laterality Date    CATARACT REMOVAL WITH IMPLANT Right     also removed free floating blood blots per pt     COLONOSCOPY  01/2008    neg    COLONOSCOPY  2003    COLONOSCOPY  5/21/2014    diverticulosis-repeat 5 years    CYST REMOVAL Left 1976    foot    EYE SURGERY Left     laser surgery for torn retina     IVC FILTER INSERTION  2018    IVC FILTER REMOVAL  2018    JOINT REPLACEMENT      LUMBAR LAMINECTOMY N/A 04/27/2018    FACETECTOMY AND INSTRUMENTED FUSION L4/5    MOHS SURGERY 2006    Melanoma neck    PTCA      2004    TONSILLECTOMY      TONSILLECTOMY AND ADENOIDECTOMY      TOTAL KNEE ARTHROPLASTY Left 10/14    [facility]    WRIST GANGLION EXCISION Right      Family History   Problem Relation Age of Onset    Heart Disease Mother 76    Heart Attack Mother     Other Mother         Smoker    Heart Disease Father 55    Heart Attack Father     Cancer Brother 35        colon    Crohn's Disease Brother     Heart Disease Paternal Grandfather 39    Heart Attack Paternal Grandfather      Social History     Socioeconomic History    Marital status:      Spouse name: Not on file    Number of children: Not on file    Years of education: Not on file    Highest education level: Not on file   Occupational History    Not on file   Social Needs    Financial resource strain: Not on file    Food insecurity     Worry: Not on file     Inability: Not on file    Transportation needs     Medical: Not on file     Non-medical: Not on file   Tobacco Use    Smoking status: Former Smoker     Packs/day: 1.00     Years: 15.00     Pack years: 15.00     Last attempt to quit: 1967     Years since quittin.9    Smokeless tobacco: Never Used   Substance and Sexual Activity    Alcohol use: No    Drug use: No    Sexual activity: Yes     Partners: Female   Lifestyle    Physical activity     Days per week: Not on file     Minutes per session: Not on file    Stress: Not on file   Relationships    Social connections     Talks on phone: Not on file     Gets together: Not on file     Attends [employer]ism service: Not on file     Active member of club or organization: Not on file     Attends meetings of clubs or organizations: Not on file     Relationship status: Not on file    Intimate partner violence     Fear of current or ex partner: Not on file     Emotionally abused: Not on file     Physically abused: Not on file     Forced sexual activity: Not on file   Other Topics Concern    Not on file   Social History Narrative    Not on file       Review of Systems  All other systems were reviewed and are negative. Physical Exam   Constitutional: He is oriented to person, place, and time. He appears well-developed and well-nourished. No distress. Obese  HENT:   Head: Normocephalic and atraumatic. Mouth/Throat: Uvula is midline, oropharynx is clear and moist and mucous membranes are normal.   Eyes: Conjunctivae and EOM are normal. Pupils are equal, round, and reactive to light. Neck: Trachea normal and normal range of motion. Neck supple. No JVD present. Carotid bruit is not present. No mass and no thyromegaly present. Cardiovascular: Normal rate, regular rhythm, normal heart sounds and intact distal pulses. Exam reveals no gallop and no friction rub. No murmur heard. Pulmonary/Chest: Effort normal and breath sounds normal. No respiratory distress. He has no wheezes. He has no rales. Abdominal: Soft. Normal aorta and bowel sounds are normal. He exhibits no distension and no mass. There is no hepatosplenomegaly. No tenderness. Musculoskeletal: He exhibits no edema and no tenderness. Neurological: He is alert and oriented to person, place, and time. He has normal strength. No cranial nerve deficit or sensory deficit. Coordination and gait normal.   Skin: Skin is warm and dry. No rash noted. No erythema. Psychiatric: He has a normal mood and affect. His behavior is normal.     EKG Interpretation:  Cardiac clearance. Lab Review Ordered by surgeon        Assessment:       70 y.o. patient with planned surgery as above. Known risk factors for perioperative complications: Coronary artery disease, Hypertension, DVT hx  Current medications which may produce withdrawal symptoms if withheld perioperatively: none     1. Pre-operative cardiovascular examination  Cleared for surgery by Dr Russ Bolton    2. Preop examination  Cleared for surgery pending surgeon labs.      3. Spinal stenosis of lumbar region, unspecified whether neurogenic claudication present  Cleared for surgery pending surgeon labs ordered. 4. [address]roiliitis ([facility]  Cleared for surgery pending surgeon ordered labs. 5. Chronic deep vein thrombosis (DVT) of distal vein of right lower extremity (HCC)  Pt will d/c Eliquis 2 days prior to surgery    6. Coronary artery disease involving native heart, angina presence unspecified, unspecified vessel or lesion type  Appears stable by Dr Mychal Rocha management. 7. Dyslipidemia  RF needed today    - fenofibrate (TRICOR) 145 MG tablet; TAKE 1 TABLET BY MOUTH EVERY DAY  Dispense: 90 tablet; Refill: 3    8. Need for influenza vaccination  Pt tolerated well    - INFLUENZA, QUADV, ADJUVANTED, 65 YRS =, IM, PF, PREFILL SYR, 0.5ML (FLUAD)       Plan:     1. Preoperative workup as follows: Cardiac clearance, Blood work ordered by surgeon  2. Change in medication regimen before surgery: Hold all medications on morning of surgery, Discontinue ASA 7 days before surgery, Discontinue NSAIDs (Ibuprofen, Advil, Aleve, Motrin) 7 days before surgery, Discontinue Eliquis 2 days before surgery, Ok to take Tylenol prior to surgery. 3. Prophylaxis for cardiac events with perioperative beta-blockers: Not indicated  ACC/AHA indications for pre-operative beta-blocker use:    · Vascular surgery with history of postitive stress test  · Intermediate or high risk surgery with history of CAD   · Intermediate or high risk surgery with multiple clinical predictors of CAD- 2 of the following: history of compensated or prior heart failure, history of cerebrovascular disease, DM, or renal insufficiency    Routine administration of higher-dose, long-acting metoprolol in beta-blocker-naïve patients on the day of surgery, and in the absence of dose titration is associated with an overall increase in mortality.   Beta-blockers should be started days to weeks prior to surgery and titrated to pulse < 70.  4. Deep vein

## 2020-12-14 NOTE — TELEPHONE ENCOUNTER
Agree, pt should call other pharmacies to see if they have in stock.      I could change to 160mg pills if needed

## 2020-12-14 NOTE — TELEPHONE ENCOUNTER
Spoke with pharmacy / Robyn Kimbrough ,   The suggested alternatives are :   Atorvastatin or Crestor     They have Fenofibrate  160 mg in stock. . This is due to back now 2 weeks. They are recommending pts call around to see if anyone has it on shelf and can fill.

## 2020-12-14 NOTE — TELEPHONE ENCOUNTER
Pharmacy told patient that they need to get an alternative for Fenofibrate. They said theu  have sent us a fax - please advise.   [facility]

## 2020-12-15 ENCOUNTER — NURSE ONLY (OUTPATIENT)
Dept: PRIMARY CARE CLINIC | Age: 75
End: 2020-12-15
Payer: MEDICARE

## 2020-12-15 PROCEDURE — 99211 OFF/OP EST MAY X REQ PHY/QHP: CPT | Performed by: NURSE PRACTITIONER

## 2020-12-16 ENCOUNTER — TELEPHONE (OUTPATIENT)
Dept: INTERNAL MEDICINE CLINIC | Age: 75
End: 2020-12-16

## 2020-12-16 LAB — SARS-COV-2, NAA: NOT DETECTED

## 2020-12-16 NOTE — TELEPHONE ENCOUNTER
Eliquis should be stopped 48 hours prior to surgery. Why does Hui want him off for longer than that?  It is not Coumadin

## 2020-12-16 NOTE — TELEPHONE ENCOUNTER
Keyana Ambrocio is having back surgery on Monday and delia needs the ok for him to be off of his eliquis for 5 days.  Please call neal with the ok

## 2020-12-17 NOTE — PROGRESS NOTES
PRE-OP INSTRUCTIONS FOR THE SURGICAL PATIENT YOU ARE UNABLE TO MAKE CONTACT FOR AN INTERVIEW:       All patients having surgery or anesthesia are required to be Covid tested. You will need to quarantined from the time you are tested until your surgery. 1. Follow instructions for your ARRIVAL TIME as DIRECTED BY YOUR SURGEON. 2. Enter the MAIN entrance located on [email or web address] and report to the desk. 3. Bring your insurance & prescription card and photo ID with you. You may also be asked to pay a co-pay, as you may want to bring a check or credit card with you. 4. Leave all other valuables at home. 5. Arrange for someone to drive you home and be with you for the first 24 hours after discharge. 6. Bring your medication list with you day of surgery with doses and frequency listed  7. You must contact your surgeon for Instructions regarding:              - ALL medication instructions, especially if taking blood thinners, aspirin, or diabetic medication.  - IF  there is a change in your physical condition such as a cold, fever, rash, cuts, sores or any other infection, especially near your surgical site. 8. A Pre-op History and Physical for surgery MUST be completed by your Physician or an Urgent Care within 30 days of your procedure date. Please bring a copy with you on the day of your procedure and along with any other testing performed. 9. DO NOT EAT ANYTHING eight hours prior to surgery. May have up to 8 ounces of water 4 hours prior to surgery. 10. No gum, candy, mints, or ice chips day of procedure. 11. Please refrain from drinking alcohol the day before or day of your procedure. 12. Please do not smoke the day of your procedure. 13. Dress in loose, comfortable clothing appropriate for redressing after your procedure. Do not wear jewelry (including body piercings), make-up, fingernail polish, lotion, powders or metal hairclips.    13. Contacts will need to be removed prior to surgery. You may want to bring your eye glasses to wear immediately before and after surgery. 14. Dentures will need to be removed before your procedure. 13. Bring cases for your glasses, contacts, dentures, or hearing aids to protect them while you are in surgery. 16. If you use a CPAP, please bring it with you on the day of your procedure. 17. Do not shave or wax for 72 hours prior to procedure near your operative site  18. FOR WOMAN OF CHILDBEARING AGE ONLY- please bring a urine sample with you on day of surgery or make sure we can collect on arrival.     If you have further questions, you may contact your surgeon's office or us at [phone]     Left instructions on patient's voicemail. Dangelo Phillips. 12/17/2020 .2:42 PM

## 2020-12-18 ENCOUNTER — ANESTHESIA EVENT (OUTPATIENT)
Dept: OPERATING ROOM | Age: 75
DRG: 456 | End: 2020-12-18
Payer: MEDICARE

## 2020-12-21 ENCOUNTER — HOSPITAL ENCOUNTER (INPATIENT)
Age: 75
LOS: 5 days | Discharge: HOME | DRG: 456 | End: 2020-12-26
Attending: NEUROLOGICAL SURGERY | Admitting: NEUROLOGICAL SURGERY
Payer: MEDICARE

## 2020-12-21 ENCOUNTER — ANESTHESIA (OUTPATIENT)
Dept: OPERATING ROOM | Age: 75
DRG: 456 | End: 2020-12-21
Payer: MEDICARE

## 2020-12-21 ENCOUNTER — APPOINTMENT (OUTPATIENT)
Dept: CT IMAGING | Age: 75
DRG: 456 | End: 2020-12-21
Attending: NEUROLOGICAL SURGERY
Payer: MEDICARE

## 2020-12-21 VITALS — SYSTOLIC BLOOD PRESSURE: 123 MMHG | TEMPERATURE: 98.1 F | OXYGEN SATURATION: 97 % | DIASTOLIC BLOOD PRESSURE: 61 MMHG

## 2020-12-21 PROBLEM — M48.062 SPINAL STENOSIS, LUMBAR REGION WITH NEUROGENIC CLAUDICATION: Status: ACTIVE | Noted: 2020-12-21

## 2020-12-21 LAB
ABO/RH: NORMAL
ANTIBODY SCREEN: NORMAL
APTT: 28.6 SEC (ref 24.2–36.2)
INR BLD: 0.92 (ref 0.86–1.14)
PROTHROMBIN TIME: 10.7 SEC (ref 10–13.2)

## 2020-12-21 PROCEDURE — 2500000003 HC RX 250 WO HCPCS: Performed by: NURSE ANESTHETIST, CERTIFIED REGISTERED

## 2020-12-21 PROCEDURE — 1200000000 HC SEMI PRIVATE

## 2020-12-21 PROCEDURE — 6360000002 HC RX W HCPCS: Performed by: NURSE ANESTHETIST, CERTIFIED REGISTERED

## 2020-12-21 PROCEDURE — 2720000010 HC SURG SUPPLY STERILE: Performed by: NEUROLOGICAL SURGERY

## 2020-12-21 PROCEDURE — 85730 THROMBOPLASTIN TIME PARTIAL: CPT

## 2020-12-21 PROCEDURE — 2580000003 HC RX 258: Performed by: PHYSICIAN ASSISTANT

## 2020-12-21 PROCEDURE — 85610 PROTHROMBIN TIME: CPT

## 2020-12-21 PROCEDURE — 6360000002 HC RX W HCPCS: Performed by: PHYSICIAN ASSISTANT

## 2020-12-21 PROCEDURE — 3700000001 HC ADD 15 MINUTES (ANESTHESIA): Performed by: NEUROLOGICAL SURGERY

## 2020-12-21 PROCEDURE — 6360000002 HC RX W HCPCS: Performed by: ANESTHESIOLOGY

## 2020-12-21 PROCEDURE — 6370000000 HC RX 637 (ALT 250 FOR IP): Performed by: PHYSICIAN ASSISTANT

## 2020-12-21 PROCEDURE — 0SG10K1 FUSION OF 2 OR MORE LUMBAR VERTEBRAL JOINTS WITH NONAUTOLOGOUS TISSUE SUBSTITUTE, POSTERIOR APPROACH, POSTERIOR COLUMN, OPEN APPROACH: ICD-10-PCS | Performed by: NEUROLOGICAL SURGERY

## 2020-12-21 PROCEDURE — 86850 RBC ANTIBODY SCREEN: CPT

## 2020-12-21 PROCEDURE — 0SG30K1 FUSION OF LUMBOSACRAL JOINT WITH NONAUTOLOGOUS TISSUE SUBSTITUTE, POSTERIOR APPROACH, POSTERIOR COLUMN, OPEN APPROACH: ICD-10-PCS | Performed by: NEUROLOGICAL SURGERY

## 2020-12-21 PROCEDURE — 3700000000 HC ANESTHESIA ATTENDED CARE: Performed by: NEUROLOGICAL SURGERY

## 2020-12-21 PROCEDURE — 01NB0ZZ RELEASE LUMBAR NERVE, OPEN APPROACH: ICD-10-PCS | Performed by: NEUROLOGICAL SURGERY

## 2020-12-21 PROCEDURE — 3600000004 HC SURGERY LEVEL 4 BASE: Performed by: NEUROLOGICAL SURGERY

## 2020-12-21 PROCEDURE — 6360000002 HC RX W HCPCS

## 2020-12-21 PROCEDURE — 00NY0ZZ RELEASE LUMBAR SPINAL CORD, OPEN APPROACH: ICD-10-PCS | Performed by: NEUROLOGICAL SURGERY

## 2020-12-21 PROCEDURE — 6360000002 HC RX W HCPCS: Performed by: NEUROLOGICAL SURGERY

## 2020-12-21 PROCEDURE — 0SG80KZ FUSION OF LEFT SACROILIAC JOINT WITH NONAUTOLOGOUS TISSUE SUBSTITUTE, OPEN APPROACH: ICD-10-PCS | Performed by: NEUROLOGICAL SURGERY

## 2020-12-21 PROCEDURE — 86901 BLOOD TYPING SEROLOGIC RH(D): CPT

## 2020-12-21 PROCEDURE — 77011 CT SCAN FOR LOCALIZATION: CPT

## 2020-12-21 PROCEDURE — 2580000003 HC RX 258: Performed by: ANESTHESIOLOGY

## 2020-12-21 PROCEDURE — 7100000000 HC PACU RECOVERY - FIRST 15 MIN: Performed by: NEUROLOGICAL SURGERY

## 2020-12-21 PROCEDURE — 0SG804Z FUSION OF LEFT SACROILIAC JOINT WITH INTERNAL FIXATION DEVICE, OPEN APPROACH: ICD-10-PCS | Performed by: NEUROLOGICAL SURGERY

## 2020-12-21 PROCEDURE — 94761 N-INVAS EAR/PLS OXIMETRY MLT: CPT

## 2020-12-21 PROCEDURE — 0SG70KZ FUSION OF RIGHT SACROILIAC JOINT WITH NONAUTOLOGOUS TISSUE SUBSTITUTE, OPEN APPROACH: ICD-10-PCS | Performed by: NEUROLOGICAL SURGERY

## 2020-12-21 PROCEDURE — C9359 IMPLNT,BON VOID FILLER-PUTTY: HCPCS | Performed by: NEUROLOGICAL SURGERY

## 2020-12-21 PROCEDURE — 2500000003 HC RX 250 WO HCPCS: Performed by: NEUROLOGICAL SURGERY

## 2020-12-21 PROCEDURE — 0SG704Z FUSION OF RIGHT SACROILIAC JOINT WITH INTERNAL FIXATION DEVICE, OPEN APPROACH: ICD-10-PCS | Performed by: NEUROLOGICAL SURGERY

## 2020-12-21 PROCEDURE — C9290 INJ, BUPIVACAINE LIPOSOME: HCPCS | Performed by: NEUROLOGICAL SURGERY

## 2020-12-21 PROCEDURE — 86900 BLOOD TYPING SEROLOGIC ABO: CPT

## 2020-12-21 PROCEDURE — 0RG70K1 FUSION OF 2 TO 7 THORACIC VERTEBRAL JOINTS WITH NONAUTOLOGOUS TISSUE SUBSTITUTE, POSTERIOR APPROACH, POSTERIOR COLUMN, OPEN APPROACH: ICD-10-PCS | Performed by: NEUROLOGICAL SURGERY

## 2020-12-21 PROCEDURE — 3600000014 HC SURGERY LEVEL 4 ADDTL 15MIN: Performed by: NEUROLOGICAL SURGERY

## 2020-12-21 PROCEDURE — C1713 ANCHOR/SCREW BN/BN,TIS/BN: HCPCS | Performed by: NEUROLOGICAL SURGERY

## 2020-12-21 PROCEDURE — 94640 AIRWAY INHALATION TREATMENT: CPT

## 2020-12-21 PROCEDURE — 0RGA0K1 FUSION OF THORACOLUMBAR VERTEBRAL JOINT WITH NONAUTOLOGOUS TISSUE SUBSTITUTE, POSTERIOR APPROACH, POSTERIOR COLUMN, OPEN APPROACH: ICD-10-PCS | Performed by: NEUROLOGICAL SURGERY

## 2020-12-21 PROCEDURE — 2709999900 HC NON-CHARGEABLE SUPPLY: Performed by: NEUROLOGICAL SURGERY

## 2020-12-21 PROCEDURE — 2580000003 HC RX 258: Performed by: NEUROLOGICAL SURGERY

## 2020-12-21 PROCEDURE — 7100000001 HC PACU RECOVERY - ADDTL 15 MIN: Performed by: NEUROLOGICAL SURGERY

## 2020-12-21 PROCEDURE — 2700000000 HC OXYGEN THERAPY PER DAY

## 2020-12-21 DEVICE — PIN FIX DIA3.2MM EXCHG DISP FOR IFUSE IMPL SYS: Type: IMPLANTABLE DEVICE | Site: SPINE LUMBAR | Status: FUNCTIONAL

## 2020-12-21 DEVICE — ROD SPNL L480MM DIA5.5MM POST THORLUM CO CHROM SMOOTH STR: Type: IMPLANTABLE DEVICE | Site: SPINE LUMBAR | Status: FUNCTIONAL

## 2020-12-21 DEVICE — SET SCR SPNL L25MM DIA5.5MM TI FOR 5.5MM ROD EXPEDIUM: Type: IMPLANTABLE DEVICE | Site: SPINE LUMBAR | Status: FUNCTIONAL

## 2020-12-21 DEVICE — SCREW SPNL L90MM DIA8MM SACR IL TI T27 HD VIPER +: Type: IMPLANTABLE DEVICE | Site: SPINE LUMBAR | Status: FUNCTIONAL

## 2020-12-21 DEVICE — BONE GRAFT KIT 7510400 INFUSE MEDIUM
Type: IMPLANTABLE DEVICE | Site: SPINE LUMBAR | Status: FUNCTIONAL
Brand: INFUSE® BONE GRAFT

## 2020-12-21 DEVICE — IMPLANTABLE DEVICE
Type: IMPLANTABLE DEVICE | Site: SPINE LUMBAR | Status: FUNCTIONAL
Brand: IFUSE IMPLANT SYSTEM

## 2020-12-21 DEVICE — PIN FIX DIA3.2MM BLNT DISP FOR IFUSE IMPL SYS: Type: IMPLANTABLE DEVICE | Site: SPINE LUMBAR | Status: FUNCTIONAL

## 2020-12-21 DEVICE — IMPLANTABLE DEVICE: Type: IMPLANTABLE DEVICE | Site: SPINE LUMBAR | Status: FUNCTIONAL

## 2020-12-21 DEVICE — GRAFT BNE MED 6.25 CC MTRX FIBERGRAFT BG: Type: IMPLANTABLE DEVICE | Site: SPINE LUMBAR | Status: FUNCTIONAL

## 2020-12-21 DEVICE — BONE GRFT SUB L 12.5CC BIOACTIVE GLS MTRX: Type: IMPLANTABLE DEVICE | Site: SPINE LUMBAR | Status: FUNCTIONAL

## 2020-12-21 RX ORDER — AMLODIPINE BESYLATE 5 MG/1
5 TABLET ORAL DAILY
Status: DISCONTINUED | OUTPATIENT
Start: 2020-12-22 | End: 2020-12-26 | Stop reason: HOSPADM

## 2020-12-21 RX ORDER — SODIUM CHLORIDE, SODIUM LACTATE, POTASSIUM CHLORIDE, CALCIUM CHLORIDE 600; 310; 30; 20 MG/100ML; MG/100ML; MG/100ML; MG/100ML
INJECTION, SOLUTION INTRAVENOUS CONTINUOUS
Status: DISCONTINUED | OUTPATIENT
Start: 2020-12-21 | End: 2020-12-21

## 2020-12-21 RX ORDER — CEFAZOLIN SODIUM 2 G/50ML
2 SOLUTION INTRAVENOUS ONCE
Status: COMPLETED | OUTPATIENT
Start: 2020-12-21 | End: 2020-12-21

## 2020-12-21 RX ORDER — PROPOFOL 10 MG/ML
INJECTION, EMULSION INTRAVENOUS CONTINUOUS PRN
Status: DISCONTINUED | OUTPATIENT
Start: 2020-12-21 | End: 2020-12-21 | Stop reason: SDUPTHER

## 2020-12-21 RX ORDER — SODIUM CHLORIDE 0.9 % (FLUSH) 0.9 %
10 SYRINGE (ML) INJECTION EVERY 12 HOURS SCHEDULED
Status: DISCONTINUED | OUTPATIENT
Start: 2020-12-21 | End: 2020-12-26 | Stop reason: HOSPADM

## 2020-12-21 RX ORDER — SUCCINYLCHOLINE CHLORIDE 20 MG/ML
INJECTION INTRAMUSCULAR; INTRAVENOUS PRN
Status: DISCONTINUED | OUTPATIENT
Start: 2020-12-21 | End: 2020-12-21 | Stop reason: SDUPTHER

## 2020-12-21 RX ORDER — GLYCOPYRROLATE 0.2 MG/ML
INJECTION INTRAMUSCULAR; INTRAVENOUS PRN
Status: DISCONTINUED | OUTPATIENT
Start: 2020-12-21 | End: 2020-12-21 | Stop reason: SDUPTHER

## 2020-12-21 RX ORDER — OXYCODONE HYDROCHLORIDE AND ACETAMINOPHEN 5; 325 MG/1; MG/1
1 TABLET ORAL
Status: DISCONTINUED | OUTPATIENT
Start: 2020-12-21 | End: 2020-12-21 | Stop reason: HOSPADM

## 2020-12-21 RX ORDER — FENTANYL CITRATE 50 UG/ML
50 INJECTION, SOLUTION INTRAMUSCULAR; INTRAVENOUS EVERY 5 MIN PRN
Status: DISCONTINUED | OUTPATIENT
Start: 2020-12-21 | End: 2020-12-21 | Stop reason: HOSPADM

## 2020-12-21 RX ORDER — HYDRALAZINE HYDROCHLORIDE 20 MG/ML
5 INJECTION INTRAMUSCULAR; INTRAVENOUS EVERY 10 MIN PRN
Status: DISCONTINUED | OUTPATIENT
Start: 2020-12-21 | End: 2020-12-21 | Stop reason: HOSPADM

## 2020-12-21 RX ORDER — DOXAZOSIN MESYLATE 4 MG/1
8 TABLET ORAL DAILY
Status: DISCONTINUED | OUTPATIENT
Start: 2020-12-22 | End: 2020-12-26 | Stop reason: HOSPADM

## 2020-12-21 RX ORDER — LABETALOL 20 MG/4 ML (5 MG/ML) INTRAVENOUS SYRINGE
5 EVERY 10 MIN PRN
Status: DISCONTINUED | OUTPATIENT
Start: 2020-12-21 | End: 2020-12-21 | Stop reason: HOSPADM

## 2020-12-21 RX ORDER — ATORVASTATIN CALCIUM 20 MG/1
20 TABLET, FILM COATED ORAL DAILY
Status: DISCONTINUED | OUTPATIENT
Start: 2020-12-22 | End: 2020-12-26 | Stop reason: HOSPADM

## 2020-12-21 RX ORDER — ONDANSETRON 2 MG/ML
4 INJECTION INTRAMUSCULAR; INTRAVENOUS
Status: DISCONTINUED | OUTPATIENT
Start: 2020-12-21 | End: 2020-12-21 | Stop reason: HOSPADM

## 2020-12-21 RX ORDER — SODIUM CHLORIDE 9 MG/ML
INJECTION, SOLUTION INTRAVENOUS CONTINUOUS
Status: DISCONTINUED | OUTPATIENT
Start: 2020-12-21 | End: 2020-12-22

## 2020-12-21 RX ORDER — ROCURONIUM BROMIDE 10 MG/ML
INJECTION, SOLUTION INTRAVENOUS PRN
Status: DISCONTINUED | OUTPATIENT
Start: 2020-12-21 | End: 2020-12-21 | Stop reason: SDUPTHER

## 2020-12-21 RX ORDER — EPHEDRINE SULFATE 50 MG/ML
INJECTION INTRAVENOUS PRN
Status: DISCONTINUED | OUTPATIENT
Start: 2020-12-21 | End: 2020-12-21 | Stop reason: SDUPTHER

## 2020-12-21 RX ORDER — ONDANSETRON 2 MG/ML
4 INJECTION INTRAMUSCULAR; INTRAVENOUS EVERY 6 HOURS PRN
Status: DISCONTINUED | OUTPATIENT
Start: 2020-12-21 | End: 2020-12-26 | Stop reason: HOSPADM

## 2020-12-21 RX ORDER — NALOXONE HYDROCHLORIDE 0.4 MG/ML
INJECTION, SOLUTION INTRAMUSCULAR; INTRAVENOUS; SUBCUTANEOUS
Status: COMPLETED
Start: 2020-12-21 | End: 2020-12-21

## 2020-12-21 RX ORDER — ONDANSETRON 2 MG/ML
INJECTION INTRAMUSCULAR; INTRAVENOUS PRN
Status: DISCONTINUED | OUTPATIENT
Start: 2020-12-21 | End: 2020-12-21 | Stop reason: SDUPTHER

## 2020-12-21 RX ORDER — FENOFIBRATE 160 MG/1
160 TABLET ORAL DAILY
Status: DISCONTINUED | OUTPATIENT
Start: 2020-12-22 | End: 2020-12-26 | Stop reason: HOSPADM

## 2020-12-21 RX ORDER — MEPERIDINE HYDROCHLORIDE 25 MG/ML
12.5 INJECTION INTRAMUSCULAR; INTRAVENOUS; SUBCUTANEOUS EVERY 5 MIN PRN
Status: DISCONTINUED | OUTPATIENT
Start: 2020-12-21 | End: 2020-12-21 | Stop reason: HOSPADM

## 2020-12-21 RX ORDER — FENTANYL CITRATE 50 UG/ML
INJECTION, SOLUTION INTRAMUSCULAR; INTRAVENOUS PRN
Status: DISCONTINUED | OUTPATIENT
Start: 2020-12-21 | End: 2020-12-21 | Stop reason: SDUPTHER

## 2020-12-21 RX ORDER — LISINOPRIL 20 MG/1
20 TABLET ORAL DAILY
Status: DISCONTINUED | OUTPATIENT
Start: 2020-12-22 | End: 2020-12-26 | Stop reason: HOSPADM

## 2020-12-21 RX ORDER — NALOXONE HYDROCHLORIDE 0.4 MG/ML
0.1 INJECTION, SOLUTION INTRAMUSCULAR; INTRAVENOUS; SUBCUTANEOUS EVERY 5 MIN PRN
Status: COMPLETED | OUTPATIENT
Start: 2020-12-21 | End: 2020-12-21

## 2020-12-21 RX ORDER — HYDROMORPHONE HCL 110MG/55ML
PATIENT CONTROLLED ANALGESIA SYRINGE INTRAVENOUS PRN
Status: DISCONTINUED | OUTPATIENT
Start: 2020-12-21 | End: 2020-12-21 | Stop reason: SDUPTHER

## 2020-12-21 RX ORDER — NALOXONE HYDROCHLORIDE 0.4 MG/ML
0.1 INJECTION, SOLUTION INTRAMUSCULAR; INTRAVENOUS; SUBCUTANEOUS PRN
Status: DISCONTINUED | OUTPATIENT
Start: 2020-12-21 | End: 2020-12-26 | Stop reason: HOSPADM

## 2020-12-21 RX ORDER — CEFAZOLIN SODIUM 2 G/50ML
2 SOLUTION INTRAVENOUS EVERY 8 HOURS
Status: DISCONTINUED | OUTPATIENT
Start: 2020-12-21 | End: 2020-12-21

## 2020-12-21 RX ORDER — PROMETHAZINE HYDROCHLORIDE 12.5 MG/1
12.5 TABLET ORAL EVERY 6 HOURS PRN
Status: DISCONTINUED | OUTPATIENT
Start: 2020-12-21 | End: 2020-12-26 | Stop reason: HOSPADM

## 2020-12-21 RX ORDER — FENTANYL CITRATE 50 UG/ML
25 INJECTION, SOLUTION INTRAMUSCULAR; INTRAVENOUS EVERY 5 MIN PRN
Status: DISCONTINUED | OUTPATIENT
Start: 2020-12-21 | End: 2020-12-21 | Stop reason: HOSPADM

## 2020-12-21 RX ORDER — SODIUM CHLORIDE 0.9 % (FLUSH) 0.9 %
10 SYRINGE (ML) INJECTION PRN
Status: DISCONTINUED | OUTPATIENT
Start: 2020-12-21 | End: 2020-12-26 | Stop reason: HOSPADM

## 2020-12-21 RX ORDER — LIDOCAINE HYDROCHLORIDE 20 MG/ML
INJECTION, SOLUTION INFILTRATION; PERINEURAL PRN
Status: DISCONTINUED | OUTPATIENT
Start: 2020-12-21 | End: 2020-12-21 | Stop reason: SDUPTHER

## 2020-12-21 RX ORDER — PROPOFOL 10 MG/ML
INJECTION, EMULSION INTRAVENOUS PRN
Status: DISCONTINUED | OUTPATIENT
Start: 2020-12-21 | End: 2020-12-21 | Stop reason: SDUPTHER

## 2020-12-21 RX ORDER — CEFAZOLIN SODIUM 2 G/50ML
2 SOLUTION INTRAVENOUS EVERY 8 HOURS
Status: COMPLETED | OUTPATIENT
Start: 2020-12-22 | End: 2020-12-22

## 2020-12-21 RX ORDER — OXYCODONE HYDROCHLORIDE 5 MG/1
5 TABLET ORAL EVERY 4 HOURS PRN
Status: DISCONTINUED | OUTPATIENT
Start: 2020-12-21 | End: 2020-12-22

## 2020-12-21 RX ORDER — PROMETHAZINE HYDROCHLORIDE 25 MG/ML
6.25 INJECTION, SOLUTION INTRAMUSCULAR; INTRAVENOUS
Status: DISCONTINUED | OUTPATIENT
Start: 2020-12-21 | End: 2020-12-21 | Stop reason: HOSPADM

## 2020-12-21 RX ORDER — ALBUTEROL SULFATE 2.5 MG/3ML
2.5 SOLUTION RESPIRATORY (INHALATION) EVERY 6 HOURS PRN
Status: DISCONTINUED | OUTPATIENT
Start: 2020-12-21 | End: 2020-12-26 | Stop reason: HOSPADM

## 2020-12-21 RX ADMIN — PROPOFOL 150 MCG/KG/MIN: 10 INJECTION, EMULSION INTRAVENOUS at 14:08

## 2020-12-21 RX ADMIN — LIDOCAINE HYDROCHLORIDE 100 MG: 20 INJECTION, SOLUTION INFILTRATION; PERINEURAL at 14:06

## 2020-12-21 RX ADMIN — LIDOCAINE HYDROCHLORIDE 100 MG: 20 INJECTION, SOLUTION INFILTRATION; PERINEURAL at 14:54

## 2020-12-21 RX ADMIN — ROCURONIUM BROMIDE 50 MG: 10 INJECTION INTRAVENOUS at 14:56

## 2020-12-21 RX ADMIN — HYDROMORPHONE HYDROCHLORIDE 2 MG: 2 INJECTION, SOLUTION INTRAMUSCULAR; INTRAVENOUS; SUBCUTANEOUS at 14:05

## 2020-12-21 RX ADMIN — SODIUM CHLORIDE, POTASSIUM CHLORIDE, SODIUM LACTATE AND CALCIUM CHLORIDE: 600; 310; 30; 20 INJECTION, SOLUTION INTRAVENOUS at 10:31

## 2020-12-21 RX ADMIN — PHENYLEPHRINE HYDROCHLORIDE 80 MCG: 10 INJECTION, SOLUTION INTRAMUSCULAR; INTRAVENOUS; SUBCUTANEOUS at 18:33

## 2020-12-21 RX ADMIN — NALOXONE HYDROCHLORIDE 0.1 MG: 0.4 INJECTION, SOLUTION INTRAMUSCULAR; INTRAVENOUS; SUBCUTANEOUS at 19:22

## 2020-12-21 RX ADMIN — NALOXONE HYDROCHLORIDE 0.1 MG: 0.4 INJECTION, SOLUTION INTRAMUSCULAR; INTRAVENOUS; SUBCUTANEOUS at 19:30

## 2020-12-21 RX ADMIN — ONDANSETRON 4 MG: 2 INJECTION INTRAMUSCULAR; INTRAVENOUS at 14:02

## 2020-12-21 RX ADMIN — EPHEDRINE SULFATE 10 MG: 50 INJECTION INTRAVENOUS at 14:36

## 2020-12-21 RX ADMIN — FENTANYL CITRATE 50 MCG: 50 INJECTION INTRAMUSCULAR; INTRAVENOUS at 18:11

## 2020-12-21 RX ADMIN — SUGAMMADEX 200 MG: 100 INJECTION, SOLUTION INTRAVENOUS at 18:19

## 2020-12-21 RX ADMIN — EPHEDRINE SULFATE 20 MG: 50 INJECTION INTRAVENOUS at 14:44

## 2020-12-21 RX ADMIN — FENTANYL CITRATE 200 MCG: 50 INJECTION INTRAMUSCULAR; INTRAVENOUS at 14:50

## 2020-12-21 RX ADMIN — GLYCOPYRROLATE 0.4 MG: 0.2 INJECTION INTRAMUSCULAR; INTRAVENOUS at 14:16

## 2020-12-21 RX ADMIN — SODIUM CHLORIDE, POTASSIUM CHLORIDE, SODIUM LACTATE AND CALCIUM CHLORIDE: 600; 310; 30; 20 INJECTION, SOLUTION INTRAVENOUS at 14:48

## 2020-12-21 RX ADMIN — SUCCINYLCHOLINE CHLORIDE 200 MG: 20 INJECTION, SOLUTION INTRAMUSCULAR; INTRAVENOUS; PARENTERAL at 14:07

## 2020-12-21 RX ADMIN — METHOCARBAMOL 1000 MG: 100 INJECTION, SOLUTION INTRAMUSCULAR; INTRAVENOUS at 19:45

## 2020-12-21 RX ADMIN — FAMOTIDINE 20 MG: 10 INJECTION, SOLUTION INTRAVENOUS at 14:02

## 2020-12-21 RX ADMIN — NALOXONE HYDROCHLORIDE 0.1 MG: 0.4 INJECTION, SOLUTION INTRAMUSCULAR; INTRAVENOUS; SUBCUTANEOUS at 19:40

## 2020-12-21 RX ADMIN — SODIUM CHLORIDE 990 ML: 9 INJECTION, SOLUTION INTRAVENOUS at 19:43

## 2020-12-21 RX ADMIN — PROPOFOL 100 MG: 10 INJECTION, EMULSION INTRAVENOUS at 14:06

## 2020-12-21 RX ADMIN — SODIUM CHLORIDE, POTASSIUM CHLORIDE, SODIUM LACTATE AND CALCIUM CHLORIDE: 600; 310; 30; 20 INJECTION, SOLUTION INTRAVENOUS at 16:33

## 2020-12-21 RX ADMIN — SODIUM CHLORIDE, POTASSIUM CHLORIDE, SODIUM LACTATE AND CALCIUM CHLORIDE: 600; 310; 30; 20 INJECTION, SOLUTION INTRAVENOUS at 18:20

## 2020-12-21 RX ADMIN — FENTANYL CITRATE 50 MCG: 50 INJECTION INTRAMUSCULAR; INTRAVENOUS at 18:15

## 2020-12-21 RX ADMIN — EPHEDRINE SULFATE 10 MG: 50 INJECTION INTRAVENOUS at 14:21

## 2020-12-21 RX ADMIN — ALBUTEROL SULFATE 2.5 MG: 2.5 SOLUTION RESPIRATORY (INHALATION) at 19:55

## 2020-12-21 RX ADMIN — NALOXONE HYDROCHLORIDE 0.1 MG: 0.4 INJECTION, SOLUTION INTRAMUSCULAR; INTRAVENOUS; SUBCUTANEOUS at 19:50

## 2020-12-21 RX ADMIN — OXYCODONE 5 MG: 5 TABLET ORAL at 22:40

## 2020-12-21 RX ADMIN — LIDOCAINE HYDROCHLORIDE 100 MG: 20 INJECTION, SOLUTION INFILTRATION; PERINEURAL at 16:04

## 2020-12-21 RX ADMIN — EPHEDRINE SULFATE 10 MG: 50 INJECTION INTRAVENOUS at 17:40

## 2020-12-21 RX ADMIN — PROPOFOL 50 MG: 10 INJECTION, EMULSION INTRAVENOUS at 14:07

## 2020-12-21 RX ADMIN — CEFAZOLIN SODIUM 2 G: 2 SOLUTION INTRAVENOUS at 14:12

## 2020-12-21 RX ADMIN — CEFAZOLIN SODIUM 1 G: 2 SOLUTION INTRAVENOUS at 18:11

## 2020-12-21 ASSESSMENT — PAIN SCALES - GENERAL
PAINLEVEL_OUTOF10: 0
PAINLEVEL_OUTOF10: 0
PAINLEVEL_OUTOF10: 4
PAINLEVEL_OUTOF10: 0
PAINLEVEL_OUTOF10: 6

## 2020-12-21 ASSESSMENT — PULMONARY FUNCTION TESTS
PIF_VALUE: 21
PIF_VALUE: 21
PIF_VALUE: 23
PIF_VALUE: 20
PIF_VALUE: 4
PIF_VALUE: 2
PIF_VALUE: 21
PIF_VALUE: 23
PIF_VALUE: 21
PIF_VALUE: 23
PIF_VALUE: 14
PIF_VALUE: 21
PIF_VALUE: 21
PIF_VALUE: 22
PIF_VALUE: 21
PIF_VALUE: 22
PIF_VALUE: 1
PIF_VALUE: 21
PIF_VALUE: 3
PIF_VALUE: 21
PIF_VALUE: 22
PIF_VALUE: 21
PIF_VALUE: 22
PIF_VALUE: 22
PIF_VALUE: 21
PIF_VALUE: 24
PIF_VALUE: 21
PIF_VALUE: 2
PIF_VALUE: 30
PIF_VALUE: 3
PIF_VALUE: 1
PIF_VALUE: 4
PIF_VALUE: 4
PIF_VALUE: 21
PIF_VALUE: 19
PIF_VALUE: 22
PIF_VALUE: 23
PIF_VALUE: 21
PIF_VALUE: 22
PIF_VALUE: 21
PIF_VALUE: 3
PIF_VALUE: 20
PIF_VALUE: 5
PIF_VALUE: 18
PIF_VALUE: 5
PIF_VALUE: 21
PIF_VALUE: 12
PIF_VALUE: 4
PIF_VALUE: 22
PIF_VALUE: 21
PIF_VALUE: 21
PIF_VALUE: 4
PIF_VALUE: 21
PIF_VALUE: 0
PIF_VALUE: 21
PIF_VALUE: 2
PIF_VALUE: 22
PIF_VALUE: 23
PIF_VALUE: 5
PIF_VALUE: 22
PIF_VALUE: 18
PIF_VALUE: 22
PIF_VALUE: 21
PIF_VALUE: 5
PIF_VALUE: 22
PIF_VALUE: 21
PIF_VALUE: 21
PIF_VALUE: 1
PIF_VALUE: 21
PIF_VALUE: 1
PIF_VALUE: 23
PIF_VALUE: 23
PIF_VALUE: 22
PIF_VALUE: 2
PIF_VALUE: 20
PIF_VALUE: 21
PIF_VALUE: 20
PIF_VALUE: 18
PIF_VALUE: 22
PIF_VALUE: 21
PIF_VALUE: 21
PIF_VALUE: 23
PIF_VALUE: 20
PIF_VALUE: 22
PIF_VALUE: 22
PIF_VALUE: 24
PIF_VALUE: 21
PIF_VALUE: 22
PIF_VALUE: 21
PIF_VALUE: 3
PIF_VALUE: 21
PIF_VALUE: 22
PIF_VALUE: 21
PIF_VALUE: 0
PIF_VALUE: 13
PIF_VALUE: 22
PIF_VALUE: 22
PIF_VALUE: 3
PIF_VALUE: 21
PIF_VALUE: 18
PIF_VALUE: 22
PIF_VALUE: 23
PIF_VALUE: 22
PIF_VALUE: 20
PIF_VALUE: 21
PIF_VALUE: 3
PIF_VALUE: 21
PIF_VALUE: 20
PIF_VALUE: 4
PIF_VALUE: 23
PIF_VALUE: 24
PIF_VALUE: 21
PIF_VALUE: 21
PIF_VALUE: 4
PIF_VALUE: 21
PIF_VALUE: 22
PIF_VALUE: 21
PIF_VALUE: 23
PIF_VALUE: 21
PIF_VALUE: 22
PIF_VALUE: 21
PIF_VALUE: 20
PIF_VALUE: 21
PIF_VALUE: 2
PIF_VALUE: 21
PIF_VALUE: 21
PIF_VALUE: 22
PIF_VALUE: 21
PIF_VALUE: 22
PIF_VALUE: 20
PIF_VALUE: 21
PIF_VALUE: 23
PIF_VALUE: 21
PIF_VALUE: 22
PIF_VALUE: 21
PIF_VALUE: 22
PIF_VALUE: 21
PIF_VALUE: 22
PIF_VALUE: 21
PIF_VALUE: 7
PIF_VALUE: 21
PIF_VALUE: 20
PIF_VALUE: 21
PIF_VALUE: 21
PIF_VALUE: 22
PIF_VALUE: 22
PIF_VALUE: 0
PIF_VALUE: 21
PIF_VALUE: 10
PIF_VALUE: 23
PIF_VALUE: 22
PIF_VALUE: 22
PIF_VALUE: 23
PIF_VALUE: 21
PIF_VALUE: 22
PIF_VALUE: 21
PIF_VALUE: 25
PIF_VALUE: 20
PIF_VALUE: 21
PIF_VALUE: 23
PIF_VALUE: 4
PIF_VALUE: 4
PIF_VALUE: 7
PIF_VALUE: 5
PIF_VALUE: 20
PIF_VALUE: 3
PIF_VALUE: 23
PIF_VALUE: 20
PIF_VALUE: 21
PIF_VALUE: 23
PIF_VALUE: 24
PIF_VALUE: 21
PIF_VALUE: 21
PIF_VALUE: 1
PIF_VALUE: 20
PIF_VALUE: 3
PIF_VALUE: 21
PIF_VALUE: 25
PIF_VALUE: 17
PIF_VALUE: 20
PIF_VALUE: 11
PIF_VALUE: 28
PIF_VALUE: 4
PIF_VALUE: 11
PIF_VALUE: 3
PIF_VALUE: 20
PIF_VALUE: 21
PIF_VALUE: 28
PIF_VALUE: 24
PIF_VALUE: 3
PIF_VALUE: 21
PIF_VALUE: 20
PIF_VALUE: 21
PIF_VALUE: 15
PIF_VALUE: 23
PIF_VALUE: 21
PIF_VALUE: 21
PIF_VALUE: 22
PIF_VALUE: 21
PIF_VALUE: 22
PIF_VALUE: 21
PIF_VALUE: 1
PIF_VALUE: 3
PIF_VALUE: 21
PIF_VALUE: 22
PIF_VALUE: 23
PIF_VALUE: 0
PIF_VALUE: 22
PIF_VALUE: 21
PIF_VALUE: 22
PIF_VALUE: 18
PIF_VALUE: 23
PIF_VALUE: 20
PIF_VALUE: 21
PIF_VALUE: 26
PIF_VALUE: 4
PIF_VALUE: 20
PIF_VALUE: 22
PIF_VALUE: 4
PIF_VALUE: 22
PIF_VALUE: 20
PIF_VALUE: 21
PIF_VALUE: 22
PIF_VALUE: 22
PIF_VALUE: 21
PIF_VALUE: 21
PIF_VALUE: 6
PIF_VALUE: 21
PIF_VALUE: 0
PIF_VALUE: 22
PIF_VALUE: 22
PIF_VALUE: 21
PIF_VALUE: 21
PIF_VALUE: 22
PIF_VALUE: 4
PIF_VALUE: 23
PIF_VALUE: 22
PIF_VALUE: 21
PIF_VALUE: 25
PIF_VALUE: 24
PIF_VALUE: 22
PIF_VALUE: 23
PIF_VALUE: 23
PIF_VALUE: 1
PIF_VALUE: 22
PIF_VALUE: 21
PIF_VALUE: 27
PIF_VALUE: 21
PIF_VALUE: 21
PIF_VALUE: 3
PIF_VALUE: 4
PIF_VALUE: 22
PIF_VALUE: 21
PIF_VALUE: 23
PIF_VALUE: 22
PIF_VALUE: 21
PIF_VALUE: 20
PIF_VALUE: 21
PIF_VALUE: 21
PIF_VALUE: 22
PIF_VALUE: 22
PIF_VALUE: 1
PIF_VALUE: 4
PIF_VALUE: 3
PIF_VALUE: 1
PIF_VALUE: 3
PIF_VALUE: 21
PIF_VALUE: 19
PIF_VALUE: 21
PIF_VALUE: 20
PIF_VALUE: 22

## 2020-12-21 ASSESSMENT — PAIN DESCRIPTION - PAIN TYPE: TYPE: SURGICAL PAIN

## 2020-12-21 ASSESSMENT — PAIN DESCRIPTION - DESCRIPTORS
DESCRIPTORS: ACHING;PINS AND NEEDLES
DESCRIPTORS: ACHING

## 2020-12-21 ASSESSMENT — PAIN - FUNCTIONAL ASSESSMENT
PAIN_FUNCTIONAL_ASSESSMENT: PREVENTS OR INTERFERES SOME ACTIVE ACTIVITIES AND ADLS
PAIN_FUNCTIONAL_ASSESSMENT: 0-10

## 2020-12-21 ASSESSMENT — PAIN DESCRIPTION - LOCATION: LOCATION: BACK

## 2020-12-21 ASSESSMENT — PAIN DESCRIPTION - PROGRESSION: CLINICAL_PROGRESSION: GRADUALLY WORSENING

## 2020-12-21 ASSESSMENT — PAIN DESCRIPTION - ONSET: ONSET: PROGRESSIVE

## 2020-12-21 ASSESSMENT — PAIN DESCRIPTION - FREQUENCY: FREQUENCY: CONTINUOUS

## 2020-12-21 ASSESSMENT — LIFESTYLE VARIABLES: SMOKING_STATUS: 0

## 2020-12-21 ASSESSMENT — PAIN DESCRIPTION - ORIENTATION: ORIENTATION: MID;LOWER

## 2020-12-21 NOTE — OP NOTE
Operative Report    PATIENT NAME: Harjeet Ramirez OF BIRTH: 1945  MEDICAL RECORD# [id number]  SURGERY DATE: 12/21/2020  SURGEON:  Celena Simmonds MD, PhD  DICTATED BY:  Celena Simmonds MD          PRE-OPERATIVE DIAGNOSIS:   1. Severe spondylosis T9-S1, adult degenerative kyphoscoliosis  2. Severe back and radicular pain     POST-OPERATIVE DIAGNOSIS:   1. Same     SURGEON: Harinder Oquendo M.D., Ph.D.      ASSISTANT SURGEON: Mariangel Roque M.D.      Dr. Mistry served as assistant on this surgery due to the complex nature of the procedure and the lack of a resident or fellow assistant. He provided assistance with exposure, instrumentation, and protection of critical neurovascular elements during the posterior portion.     ANESTHESIA: GETA     ESTIMATED BLOOD LOSS:  800 mL.       PROCEDURES PERFORMED:  1. Intraoperative monitoring for motor and sensory potentials  2. T9-S1 bilateral pedicle screw fixation   3. Extension down to the pelvis via T3-aplv-qwbya screws  4. T9 to iliac bilateral posterolateral fusion with autograft and FiberGraft matrix  5. Bilateral open sacroiliac joint fusion using SI-BONE porous titanium dowels   6. Computer-assisted stereotactic navigation with Airtime CT  7. SSEP, MEP, and EMG monitoring  8. Stage 2 Robby Shook type osteotomies bilateral L1-2 with central and foraminal decompression        INDICATIONS:  Mr. Rabia Gary is a 80 year old gentleman who had a prior L3 to S1 fusion at outside facility. The patient had a degenerative spondylosis and pseudoarthrosis. He was suffering from intractable back and leg pain. Given these findings and the patients symptoms, surgical revision with extension to pelvis, stabilization of the SI joints, and fusion was recommended.  The patient understands the risks and benefits of the procedure including but not limited to bleeding, infection, worsened neurologic outcome, vascular injury, cerebral spinal fluid leak, pseudoarthrosis, need for additional procedures, fracture of hardware, anesthesia risks, and death.      PROCEDURES IN DETAIL:       Under universal protocol and informed consent, the patient was brought to the operating room. General anesthesia was induced and the patient was intubated. An arterial line was placed. Neuromonitoring leads (SSEPs, MEPs, EMG) were placed. Intravenous antibiotics, 2 g Ancef, were administered. The patient was then positioned prone onto an open Jose frame linked to the AIRO intraoperative CT scanner. The arms were positioned on armboards and all pressure points were appropriately padded. The patients previous linear incision was remarked and slightly expanded to span T9-S2 levels. The surgical site was prepped and draped in the usual sterile fashion.     The incision was made with a #15 scalpel. Bovie electrocautery was then used to complete the dissection down to the fascia which was split at the midline. Subperiosteal dissection was undertaken to fully expose the remaining lamina, spinous processes, facets, and transverse processes of T9 to S2 bilaterally. An image guidance array was then attached to the spinous process and an AIRO intraoperative CT was obtained with images transferred to the [address] [place] [address] navigation unit. We confirmed that the system was accurate and proceeded with pedicle screw instrumentation at T9. For each pedicle,  holes were drilled using the AM-8 attachment on the XTRM Brandon drill in accordance with anatomic and CT guidance. A navigated pedicle probe was then used to cannulate the pedicles.  After confirming with a ball-point probe that the pedicle wall had not been breached, the screws were placed based on measurements derived via [employer] AIRO navigation. This was done at T9 to S1 bilaterally with excellent purchase.     Next, image-guidance was used to place I2-shvt-rzplo screws to achieve pelvic fixation.  A trajectory was planned and cannulated traversing the SI joints on the right.  A K-wire was placed, and a 7.5 mm cannulated tap was used to expand the channel.  Finally, 8.0 x 90 mm screw was placed. This was repeated by Dr. Ashely Sims on the opposite side in identical fashion. We then turned our attention to the sacroiliac joint fusion. A second trajectory across the sacroiliac joint complementary to the T0-lmna-ppfhs screws was planned using image guidance on the right. A pedicle probe was used to gently distract, and then a pin was inserted across the sacral iliac joint. A cannulated drill was then used to expand that channel, and a broach was used and malleted to an appropriate length determined by the image guidance. An appropriately sized implant was then chosen and impacted across the sacroiliac joint. This was repeated on the left in identical fashion. At the completion of instrumentation, a scan was obtained with the intraoperative CT scanner to confirm the position of the hardware.       The Midas roland drill was used to perform Stage 2 Belvia Endow Type osteotomies bilaterally at L1-2. The medial facet was amputated and removed. The subarticular space, central canal and foramen were generously decompressed by removing the hypertrophic ligamentum flavum, inferior/superior articular processes and osteophytic bone spurs. The foramen and subarticular zone were palpated with the nerve hook and found to be well decompressed.     With the instrumentation verified, 5.5 mm titanium rods were cut to the appropriate length and bent to fit into the polyaxial pedicle screw heads. Caps were placed and final-tightened over these to lock in the rods. Meticulous hemostasis was undertaken and the wound was irrigated copiously with antibiotic solution. The navigation frame was removed.  The remaining laminae, facets, and transverse processes of T9-S1 were bilaterally decorticated with the high speed drill. Fibergraft was placed along the exposed bone to provide a substrate for fusion from T9 to the pelvis. Two medium hemovac drains were then laid deep to the fascia, tunneled away from the incision line, and secured to the skin with a stitch. The incision was then closed in layers using 0 Vicryl sutures in the deep muscles and fascia and 2-0 Vicryl sutures in the subcutaneous tissues. Vancomycin powder (2g) was sprinkled in the wound suprafascially during the closure. A 1:1 mixture of Exparel and Marcaine was injected vivian-incisionally in the paraspinous muscles. The skin was closed with staples. The wound was dressed with Dermabond topical skin adhesive.      All needle, sponge, and instrument counts were correct. Notably, SSEPs, MEPs, and EMGs remained stable for the duration of the case. The patient was turned back to supine position onto a hospital bed and extubated without difficulty. She was taken to the PACU in stable condition. I affirm in accordance with CMS regulations that I was present and scrubbed for all critical portions of the case.      IMPLANTS:   1. Synthes Verse 5.5 system   2. Synthes Expedium iliac screws 8.0 x 90 mm bilaterally  3. SI-bone iFuse Bedrock implants 7.0 x 70 mm bilaterally  4. DePuy Cobalt Chrome rods 5.5 bilaterally  5. FiberGraft matrix     SPECIMEN: None     DRAINS:   1.  Medium Hemovac (10F) drain x 2     DISPOSITION:  PACU in stable condition.       COMPLICATIONS:   1. None apparent

## 2020-12-21 NOTE — PROGRESS NOTES
Pt arrived to \[facility]\"" alert and oriented x4, VSS, pt states NPO since midnight. Pt states LD of eliquis 12/18/2020. Joao wipes to pt back. Pt states he was able to quarantine after covid test. Pt instructed to call prior to getting up, call light in reach. Belongings placed in locker.

## 2020-12-21 NOTE — ANESTHESIA PRE PROCEDURE
Department of Anesthesiology  Preprocedure Note       Name:  Tai Villela   Age:  70 y.o.  :  1945                                          MRN:  [id number]         Date:  2020      Surgeon: Zulma Waller):  Sourav Craig. MD Adela Zaldivar MD    Procedure: Procedure(s):  L1-2 TRANSFORAMINAL LUMBAR INTERBODY FUSION, T10 PELVIS SCREW FIXATION / FUSION, [address]  . Medications prior to admission:   Prior to Admission medications    Medication Sig Start Date End Date Taking? Authorizing Provider   Multiple Vitamins-Minerals (MULTIVITAMIN ADULT PO) Take by mouth   Yes Historical Provider, MD   amLODIPine (NORVASC) 5 MG tablet Take 1 tablet by mouth daily 20  Yes Myles Henderson MD   atorvastatin (LIPITOR) 20 MG tablet TAKE 1 TABLET BY MOUTH EVERY DAY 8/10/20  Yes ADIN Fernandez CNP   metroNIDAZOLE (METROGEL) 0.75 % gel Apply topically 2 times daily. 18  Yes ADIN Carlos CNP   fenofibrate (TRICOR) 145 MG tablet TAKE 1 TABLET BY MOUTH EVERY DAY 20   ADIN Carlos CNP   lisinopril (PRINIVIL;ZESTRIL) 20 MG tablet Take 1 tablet by mouth daily 20   Myles Henderson MD   doxazosin (CARDURA) 8 MG tablet TAKE 1 TABLET BY MOUTH EVERY DAY AT NIGHT 20   ADIN Fernandez CNP   apixaban (ELIQUIS) 5 MG TABS tablet TAKE 1 TABLET BY MOUTH TWICE A DAY 5/15/20   ADIN Carlos CNP   vitamin D (CHOLECALCIFEROL) 1000 UNIT TABS tablet Take 1,000 Units by mouth daily    Historical Provider, MD   Calcium Citrate 200 MG TABS Take 800 mg by mouth daily    Historical Provider, MD   ascorbic acid (VITAMIN C) 500 MG tablet Take 500 mg by mouth daily. Historical Provider, MD       Current medications:    Current Facility-Administered Medications   Medication Dose Route Frequency Provider Last Rate Last Admin    ceFAZolin (ANCEF) 2 g in dextrose 3 % 50 mL IVPB (duplex)  2 g Intravenous Once Gaetano Ulloa MD        lactated ringers infusion   Intravenous Continuous Gertrude Riding,  mL/hr at 12/21/20 1031 New Bag at 12/21/20 1031       Allergies: Allergies   Allergen Reactions    Corticosteroids      Other reaction(s): unable to urinate    Cortisone Nausea Only    Gabapentin Other (See Comments)     Shaky    Other      STEROIDS    Prednisolone     Sulfa Antibiotics Rash and Other (See Comments)     Unknown, Rash possibly. Problem List:    Patient Active Problem List   Diagnosis Code    CAD (coronary artery disease) I25.10    Hypertension I10    Hyperlipidemia E78.5    Malignant melanoma ([address] C43.9    S/P TKR (total knee replacement) Z96.659    Obesity (BMI 30-39. 9) E66.9    Lumbar degenerative disc disease   SEVERE L45   M51.36    Spondylolisthesis at L4-L5 level M43.16    Lumbar stenosis with neurogenic claudication M48.062    Lumbar disc herniation with radiculopathy   L45 M51.16    Lumbar radiculopathy, acute  L L5 WORSE  M54.16    Spondylolisthesis, lumbar region M43.16    Benign prostatic hyperplasia with nocturia N40.1, R35.1    IFG (impaired fasting glucose) R73.01    Age-related nuclear cataract, bilateral H25.13    Deep vein thrombosis (DVT) of lower extremity (HCC) I82.409    Morbidly obese (HCC) E66.01       Past Medical History:        Diagnosis Date    CAD (coronary artery disease)     Hearing loss     right    Hyperlipidemia     Hypertension     [facility] [facility] [address]     S/P coronary artery stent placement 2004       Past Surgical History:        Procedure Laterality Date    CATARACT REMOVAL WITH IMPLANT Right     also removed free floating blood blots per pt     COLONOSCOPY  01/2008    neg    COLONOSCOPY  2003    COLONOSCOPY  5/21/2014    diverticulosis-repeat 5 years    CYST REMOVAL Left 1976    foot    EYE SURGERY Left     laser surgery for torn retina     IVC FILTER INSERTION  2018    IVC FILTER REMOVAL  2018    JOINT REPLACEMENT      LUMBAR LAMINECTOMY N/A 04/27/2018 FACETECTOMY AND INSTRUMENTED FUSION L4/5    MOHS SURGERY  2006    Melanoma neck    PTCA      2004    TONSILLECTOMY      TONSILLECTOMY AND ADENOIDECTOMY      TOTAL KNEE ARTHROPLASTY [facility] 10[facility]14    [facility]    WRIST GANGLION EXCISION Right        Social History:    Social History     Tobacco Use    Smoking status: Former Smoker     Packs/day: 1.00     Years: 15.00     Pack years: 15.00     Quit date: 1967     Years since quittin.0    Smokeless tobacco: Never Used   Substance Use Topics    Alcohol use: No                                Counseling given: Not Answered      Vital Signs (Current):   Vitals:    20 0947   BP: (!) 165/78   Pulse: 81   Resp: 16   Temp: 97.8 °F (36.6 °C)   TempSrc: Oral   SpO2: 96%   Weight: 262 lb (118.8 kg)   Height: 5' 10\" (1.778 m)                                              BP Readings from Last 3 Encounters:   20 (!) 165/78   20 136/74   20 (!) 142/68       NPO Status: Time of last liquid consumption:                         Time of last solid consumption:                         Date of last liquid consumption: 20                        Date of last solid food consumption: 20    BMI:   Wt Readings from Last 3 Encounters:   20 262 lb (118.8 kg)   20 267 lb (121.1 kg)   20 271 lb (122.9 kg)     Body mass index is 37.59 kg/m².     CBC:   Lab Results   Component Value Date    WBC 5.8 2020    RBC 4.54 2020    HGB 14.5 2020    HCT 43.8 2020    MCV 96.6 2020    RDW 12.7 2020     2020       CMP:   Lab Results   Component Value Date     2020    K 4.8 2020     2020    CO2 23 2020    BUN 28 2020    CREATININE 1.2 2020    GFRAA >60 2020    GFRAA >60 2013    AGRATIO 2.0 2020    LABGLOM 59 2020    GLUCOSE 110 2020    PROT 6.6 2020    PROT 6.8 2013    CALCIUM 9.3 2020 BILITOT 0.6 08/18/2020    ALKPHOS 40 08/18/2020    AST 25 08/18/2020    ALT 32 08/18/2020       POC Tests: No results for input(s): POCGLU, POCNA, POCK, POCCL, POCBUN, POCHEMO, POCHCT in the last 72 hours. Coags:   Lab Results   Component Value Date    PROTIME 10.7 12/21/2020    INR 0.92 12/21/2020    APTT 28.6 12/21/2020       HCG (If Applicable): No results found for: PREGTESTUR, PREGSERUM, HCG, HCGQUANT     ABGs: No results found for: PHART, PO2ART, NPG9SOJ, KRS5IYR, BEART, V2EQMGYT     Type & Screen (If Applicable):  No results found for: LABABO, LABRH    Drug/Infectious Status (If Applicable):  No results found for: HIV, HEPCAB    COVID-19 Screening (If Applicable):   Lab Results   Component Value Date    COVID19 NOT DETECTED 12/15/2020         Anesthesia Evaluation  Patient summary reviewed and Nursing notes reviewed no history of anesthetic complications:   Airway: Mallampati: II  TM distance: >3 FB     Mouth opening: > = 3 FB Dental: normal exam         Pulmonary:       (-) sleep apnea and not a current smoker                           Cardiovascular:    (+) hypertension:, CAD:, CABG/stent:, hyperlipidemia        Rhythm: regular  Rate: normal                 ROS comment: S/p stent placement 2006  Denies CP/SOB     Neuro/Psych:                ROS comment: B/l lumbar radiculopathy  GI/Hepatic/Renal:             Endo/Other:    (+) : arthritis: OA., .                 Abdominal:           Vascular:           ROS comment: H/o DVT 2 years ago. Anesthesia Plan      general     ASA 3       Induction: intravenous. MIPS: Prophylactic antiemetics administered. Anesthetic plan and risks discussed with patient. Plan discussed with CRNA.                   Pat Duenas DO   12/21/2020

## 2020-12-21 NOTE — H&P
Nasir Pang    [id number]    [facility] Same Day Surgery Update H & P  Department of General Surgery   Surgical Service   Pre-operative History and Physical  Last H & P within the last 30 days. DIAGNOSIS:   Spinal stenosis of lumbar region, unspecified whether neurogenic claudication present [M48.061]  Sacroiliitis ([address] [M46.1]    Procedure(s):  L1-2 TRANSFORAMINAL LUMBAR INTERBODY FUSION, T10 PELVIS SCREW FIXATION / FUSION, [address]  . HISTORY OF PRESENT ILLNESS:   Patient with chronic lumbar pain and right LE pain, numbness and tingling. The symptoms have been recalcitrant to conservative treatment and the patient presents today for the above procedure. Covid 19:  Patient denies fever, chills, cough or known exposure to Covid-19.       Past Medical History:        Diagnosis Date    CAD (coronary artery disease)     Hearing loss     right    Hyperlipidemia     Hypertension     [facility] [address]     S/P coronary artery stent placement 2004     Past Surgical History:        Procedure Laterality Date    CATARACT REMOVAL WITH IMPLANT Right     also removed free floating blood blots per pt     COLONOSCOPY  01/2008    neg    COLONOSCOPY  2003    COLONOSCOPY  5/21/2014    diverticulosis-repeat 5 years    CYST REMOVAL Left 1976    foot    EYE SURGERY Left     laser surgery for torn retina     IVC FILTER INSERTION  2018    IVC FILTER REMOVAL  2018    JOINT REPLACEMENT      LUMBAR LAMINECTOMY N/A 04/27/2018    FACETECTOMY AND INSTRUMENTED FUSION L4/5    MOHS SURGERY  2006    Melanoma neck    PTCA      2004    TONSILLECTOMY      TONSILLECTOMY AND ADENOIDECTOMY  1951    TOTAL KNEE ARTHROPLASTY Left 10/14    [facility]    WRIST GANGLION EXCISION Right 1979     Past Social History:  Social History     Socioeconomic History    Marital status:      Spouse name: None    Number of children: None    Years of education: None    Highest education level: None Occupational History    None   Social Needs    Financial resource strain: None    Food insecurity     Worry: None     Inability: None    Transportation needs     Medical: None     Non-medical: None   Tobacco Use    Smoking status: Former Smoker     Packs/day: 1.00     Years: 15.00     Pack years: 15.00     Quit date: 1967     Years since quittin.0    Smokeless tobacco: Never Used   Substance and Sexual Activity    Alcohol use: No    Drug use: No    Sexual activity: Yes     Partners: Female   Lifestyle    Physical activity     Days per week: None     Minutes per session: None    Stress: None   Relationships    Social connections     Talks on phone: None     Gets together: None     Attends [employer] service: None     Active member of club or organization: None     Attends meetings of clubs or organizations: None     Relationship status: None    Intimate partner violence     Fear of current or ex partner: None     Emotionally abused: None     Physically abused: None     Forced sexual activity: None   Other Topics Concern    None   Social History Narrative    None         Medications Prior to Admission:      Prior to Admission medications    Medication Sig Start Date End Date Taking? Authorizing Provider   fenofibrate (TRICOR) 145 MG tablet TAKE 1 TABLET BY MOUTH EVERY DAY 20   [place] BalloonADIN - CNP   lisinopril (PRINIVIL;ZESTRIL) 20 MG tablet Take 1 tablet by mouth daily 20   Cassidy May MD   amLODIPine (NORVASC) 5 MG tablet Take 1 tablet by mouth daily 20   Cassidy May MD   doxazosin (CARDURA) 8 MG tablet TAKE 1 TABLET BY MOUTH EVERY DAY AT NIGHT 20   [place] BalloonADIN CNP   atorvastatin (LIPITOR) 20 MG tablet TAKE 1 TABLET BY MOUTH EVERY DAY 8/10/20   [place] BalloonADIN - CNP   apixaban (ELIQUIS) 5 MG TABS tablet TAKE 1 TABLET BY MOUTH TWICE A DAY 5/15/20   ADIN Fernandez - CNP   metroNIDAZOLE (METROGEL) 0.75 % gel Apply topically 2 times daily. Initial (On Arrival)

## 2020-12-21 NOTE — PROGRESS NOTES
Outgoing call to pts wife Annamaria Cummings, updated that surgeon is an estimated two hours behind schedule, verbalized understanding.

## 2020-12-22 ENCOUNTER — APPOINTMENT (OUTPATIENT)
Dept: GENERAL RADIOLOGY | Age: 75
DRG: 456 | End: 2020-12-22
Attending: NEUROLOGICAL SURGERY
Payer: MEDICARE

## 2020-12-22 LAB
HCT VFR BLD CALC: 39.7 % (ref 40.5–52.5)
HEMOGLOBIN: 12.8 G/DL (ref 13.5–17.5)
MCH RBC QN AUTO: 32.9 PG (ref 26–34)
MCHC RBC AUTO-ENTMCNC: 32.3 G/DL (ref 31–36)
MCV RBC AUTO: 102 FL (ref 80–100)
PDW BLD-RTO: 13.2 % (ref 12.4–15.4)
PLATELET # BLD: 173 K/UL (ref 135–450)
PMV BLD AUTO: 7.4 FL (ref 5–10.5)
RBC # BLD: 3.89 M/UL (ref 4.2–5.9)
WBC # BLD: 12.3 K/UL (ref 4–11)

## 2020-12-22 PROCEDURE — 97162 PT EVAL MOD COMPLEX 30 MIN: CPT

## 2020-12-22 PROCEDURE — 6370000000 HC RX 637 (ALT 250 FOR IP): Performed by: NURSE PRACTITIONER

## 2020-12-22 PROCEDURE — 97166 OT EVAL MOD COMPLEX 45 MIN: CPT

## 2020-12-22 PROCEDURE — 1200000000 HC SEMI PRIVATE

## 2020-12-22 PROCEDURE — 51701 INSERT BLADDER CATHETER: CPT

## 2020-12-22 PROCEDURE — 97116 GAIT TRAINING THERAPY: CPT

## 2020-12-22 PROCEDURE — 72080 X-RAY EXAM THORACOLMB 2/> VW: CPT

## 2020-12-22 PROCEDURE — 6360000002 HC RX W HCPCS: Performed by: PHYSICIAN ASSISTANT

## 2020-12-22 PROCEDURE — 36415 COLL VENOUS BLD VENIPUNCTURE: CPT

## 2020-12-22 PROCEDURE — 97530 THERAPEUTIC ACTIVITIES: CPT

## 2020-12-22 PROCEDURE — 51798 US URINE CAPACITY MEASURE: CPT

## 2020-12-22 PROCEDURE — 6370000000 HC RX 637 (ALT 250 FOR IP): Performed by: PHYSICIAN ASSISTANT

## 2020-12-22 PROCEDURE — 85027 COMPLETE CBC AUTOMATED: CPT

## 2020-12-22 PROCEDURE — 2580000003 HC RX 258: Performed by: PHYSICIAN ASSISTANT

## 2020-12-22 RX ORDER — METHOCARBAMOL 500 MG/1
1000 TABLET, FILM COATED ORAL EVERY 6 HOURS
Status: DISCONTINUED | OUTPATIENT
Start: 2020-12-22 | End: 2020-12-26 | Stop reason: HOSPADM

## 2020-12-22 RX ORDER — ACETAMINOPHEN 500 MG
1000 TABLET ORAL EVERY 6 HOURS
Status: DISCONTINUED | OUTPATIENT
Start: 2020-12-22 | End: 2020-12-26 | Stop reason: HOSPADM

## 2020-12-22 RX ORDER — OXYCODONE HYDROCHLORIDE 5 MG/1
10 TABLET ORAL EVERY 4 HOURS PRN
Status: DISCONTINUED | OUTPATIENT
Start: 2020-12-22 | End: 2020-12-26 | Stop reason: HOSPADM

## 2020-12-22 RX ORDER — TAMSULOSIN HYDROCHLORIDE 0.4 MG/1
0.4 CAPSULE ORAL DAILY
Status: DISCONTINUED | OUTPATIENT
Start: 2020-12-22 | End: 2020-12-24

## 2020-12-22 RX ORDER — POLYETHYLENE GLYCOL 3350 17 G/17G
17 POWDER, FOR SOLUTION ORAL DAILY
Status: DISCONTINUED | OUTPATIENT
Start: 2020-12-22 | End: 2020-12-24

## 2020-12-22 RX ORDER — SENNA AND DOCUSATE SODIUM 50; 8.6 MG/1; MG/1
2 TABLET, FILM COATED ORAL 2 TIMES DAILY
Status: DISCONTINUED | OUTPATIENT
Start: 2020-12-22 | End: 2020-12-26 | Stop reason: HOSPADM

## 2020-12-22 RX ORDER — OXYCODONE HYDROCHLORIDE 5 MG/1
5 TABLET ORAL EVERY 4 HOURS PRN
Status: DISCONTINUED | OUTPATIENT
Start: 2020-12-22 | End: 2020-12-26 | Stop reason: HOSPADM

## 2020-12-22 RX ADMIN — ENOXAPARIN SODIUM 40 MG: 40 INJECTION SUBCUTANEOUS at 11:25

## 2020-12-22 RX ADMIN — DOCUSATE SODIUM 50 MG AND SENNOSIDES 8.6 MG 2 TABLET: 8.6; 5 TABLET, FILM COATED ORAL at 09:14

## 2020-12-22 RX ADMIN — ACETAMINOPHEN 1000 MG: 500 TABLET, COATED ORAL at 20:49

## 2020-12-22 RX ADMIN — OXYCODONE 5 MG: 5 TABLET ORAL at 14:39

## 2020-12-22 RX ADMIN — DOXAZOSIN 8 MG: 4 TABLET ORAL at 09:15

## 2020-12-22 RX ADMIN — DOCUSATE SODIUM 50 MG AND SENNOSIDES 8.6 MG 2 TABLET: 8.6; 5 TABLET, FILM COATED ORAL at 20:49

## 2020-12-22 RX ADMIN — METHOCARBAMOL 1000 MG: 100 INJECTION, SOLUTION INTRAMUSCULAR; INTRAVENOUS at 11:25

## 2020-12-22 RX ADMIN — ACETAMINOPHEN 1000 MG: 500 TABLET, COATED ORAL at 09:14

## 2020-12-22 RX ADMIN — Medication 10 ML: at 00:00

## 2020-12-22 RX ADMIN — LISINOPRIL 20 MG: 20 TABLET ORAL at 09:14

## 2020-12-22 RX ADMIN — POLYETHYLENE GLYCOL 3350 17 G: 17 POWDER, FOR SOLUTION ORAL at 09:13

## 2020-12-22 RX ADMIN — SODIUM CHLORIDE: 9 INJECTION, SOLUTION INTRAVENOUS at 06:07

## 2020-12-22 RX ADMIN — FENOFIBRATE 160 MG: 160 TABLET ORAL at 09:15

## 2020-12-22 RX ADMIN — OXYCODONE 10 MG: 5 TABLET ORAL at 10:22

## 2020-12-22 RX ADMIN — METHOCARBAMOL 1000 MG: 100 INJECTION, SOLUTION INTRAMUSCULAR; INTRAVENOUS at 03:13

## 2020-12-22 RX ADMIN — METHOCARBAMOL 1000 MG: 500 TABLET ORAL at 16:49

## 2020-12-22 RX ADMIN — METHOCARBAMOL 1000 MG: 500 TABLET ORAL at 20:49

## 2020-12-22 RX ADMIN — CEFAZOLIN SODIUM 2 G: 2 SOLUTION INTRAVENOUS at 10:22

## 2020-12-22 RX ADMIN — TAMSULOSIN HYDROCHLORIDE 0.4 MG: 0.4 CAPSULE ORAL at 16:49

## 2020-12-22 RX ADMIN — OXYCODONE 5 MG: 5 TABLET ORAL at 06:07

## 2020-12-22 RX ADMIN — Medication 10 ML: at 09:17

## 2020-12-22 RX ADMIN — AMLODIPINE BESYLATE 5 MG: 5 TABLET ORAL at 09:14

## 2020-12-22 RX ADMIN — Medication 10 ML: at 20:50

## 2020-12-22 RX ADMIN — ATORVASTATIN CALCIUM 20 MG: 20 TABLET, FILM COATED ORAL at 09:15

## 2020-12-22 RX ADMIN — CEFAZOLIN SODIUM 2 G: 2 SOLUTION INTRAVENOUS at 18:17

## 2020-12-22 RX ADMIN — CEFAZOLIN SODIUM 2 G: 2 SOLUTION INTRAVENOUS at 02:19

## 2020-12-22 RX ADMIN — ACETAMINOPHEN 1000 MG: 500 TABLET, COATED ORAL at 14:39

## 2020-12-22 ASSESSMENT — PAIN DESCRIPTION - PROGRESSION
CLINICAL_PROGRESSION: NOT CHANGED
CLINICAL_PROGRESSION: NOT CHANGED
CLINICAL_PROGRESSION: GRADUALLY WORSENING
CLINICAL_PROGRESSION: NOT CHANGED

## 2020-12-22 ASSESSMENT — PAIN SCALES - GENERAL
PAINLEVEL_OUTOF10: 0
PAINLEVEL_OUTOF10: 3
PAINLEVEL_OUTOF10: 6
PAINLEVEL_OUTOF10: 2
PAINLEVEL_OUTOF10: 6
PAINLEVEL_OUTOF10: 7
PAINLEVEL_OUTOF10: 0
PAINLEVEL_OUTOF10: 3

## 2020-12-22 ASSESSMENT — PAIN - FUNCTIONAL ASSESSMENT
PAIN_FUNCTIONAL_ASSESSMENT: PREVENTS OR INTERFERES SOME ACTIVE ACTIVITIES AND ADLS
PAIN_FUNCTIONAL_ASSESSMENT: ACTIVITIES ARE NOT PREVENTED

## 2020-12-22 ASSESSMENT — PAIN DESCRIPTION - DESCRIPTORS
DESCRIPTORS: ACHING

## 2020-12-22 ASSESSMENT — PAIN DESCRIPTION - LOCATION
LOCATION: BACK

## 2020-12-22 ASSESSMENT — PAIN DESCRIPTION - ORIENTATION
ORIENTATION: MID;LOWER
ORIENTATION: LOWER

## 2020-12-22 ASSESSMENT — PAIN DESCRIPTION - PAIN TYPE
TYPE: SURGICAL PAIN
TYPE: ACUTE PAIN

## 2020-12-22 ASSESSMENT — PAIN DESCRIPTION - FREQUENCY
FREQUENCY: CONTINUOUS

## 2020-12-22 ASSESSMENT — PAIN DESCRIPTION - ONSET
ONSET: ON-GOING

## 2020-12-22 NOTE — PROGRESS NOTES
Catrachita Moe RESPIRATORY THERAPY ASSESSMENT    Name:  Searcy Galeazzi  Medical Record Number:  [id number]  Age: 70 y.o. Gender: male  : 1945  Today's Date:  2020  Room:  [facility]    Assessment     Is the patient being admitted for a COPD or Asthma exacerbation? No   (If yes the patient will be seen every 4 hours for the first 24 hours and then reassessed)    Patient Admission Diagnosis      Allergies  Allergies   Allergen Reactions    Corticosteroids      Other reaction(s): unable to urinate    Cortisone Nausea Only    Gabapentin Other (See Comments)     Shaky    Other      STEROIDS    Prednisolone     Sulfa Antibiotics Rash and Other (See Comments)     Unknown, Rash possibly. Pulmonary History:No history  Home Oxygen Therapy:  room air  Home Respiratory Therapy:None   Current Respiratory Therapy:  Albuterol PRN  Treatment Type: HHN  Medications: Albuterol    Respiratory Severity Index(RSI)   Patients with orders for inhalation medications, oxygen, or any therapeutic treatment modality will be placed on Respiratory Protocol. They will be assessed with the first treatment and at least every 72 hours thereafter. The following severity scale will be used to determine frequency of treatment intervention.     Smoking History: No Smoking History = 0    Social History  Social History     Tobacco Use    Smoking status: Former Smoker     Packs/day: 1.00     Years: 15.00     Pack years: 15.00     Quit date: 1967     Years since quittin.0    Smokeless tobacco: Never Used   Substance Use Topics    Alcohol use: No    Drug use: No       Recent Surgical History: Surgery of Extremities = 1  Past Surgical History  Past Surgical History:   Procedure Laterality Date    CATARACT REMOVAL WITH IMPLANT Right     also removed free floating blood blots per pt     COLONOSCOPY  2008    neg    COLONOSCOPY      COLONOSCOPY  2014    diverticulosis-repeat 5 years    CYST REMOVAL Left 1976    foot  EYE SURGERY Left     laser surgery for torn retina     IVC FILTER INSERTION  2018    IVC FILTER REMOVAL  2018    JOINT REPLACEMENT      LUMBAR LAMINECTOMY N/A 04/27/2018    FACETECTOMY AND INSTRUMENTED FUSION L4/5    MOHS SURGERY  2006    Melanoma neck    PTCA      2004    TONSILLECTOMY      TONSILLECTOMY AND ADENOIDECTOMY  1951    TOTAL KNEE ARTHROPLASTY Left 10/14    [facility]    WRIST GANGLION EXCISION Right 1979       Level of Consciousness: Alert, Oriented, and Cooperative = 0    Level of Activity: Walking with assistance = 1    Respiratory Pattern: Regular Pattern; RR 8-20 = 0    Breath Sounds: Clear = 0    Sputum  Sputum Color: None, Tenacity: None, Sputum How Obtained: Spontaneous cough  Cough: Strong, spontaneous, non-productive = 0    Vital Signs   /77   Pulse 98   Temp 97.7 °F (36.5 °C) (Oral)   Resp 16   Ht 5' 10\" (1.778 m)   Wt 260 lb (117.9 kg)   SpO2 94%   BMI 37.31 kg/m²   SPO2 (COPD values may differ): 86-87% on room air or greater than 92% on FiO2 35- 50% = 3    Peak Flow (asthma only): not applicable = 0    RSI: 5-6 = Q4hr PRN (every four hours as needed) for dyspnea        Plan       Goals: medication delivery and improve oxygenation    Patient/caregiver was educated on the proper method of use for Respiratory Care Devices:  Yes      Level of patient/caregiver understanding able to:   ? Verbalize understanding   ? Demonstrate understanding       ? Teach back        ? Needs reinforcement       ? No available caregiver               ? Other:     Response to education:  Good     Is patient being placed on Home Treatment Regimen? No     Does the patient have everything they need prior to discharge? NA     Comments: post op pt, requiring 6L nasal cannula. Not in respiratory distress/lungs clear     Plan of Care: albuterol PRN    Electronically signed by Dmitry Gong RCP on 12/22/2020 at 12:33 AM    Respiratory Protocol Guidelines     1.  Assessment and treatment by Respiratory Therapy will be initiated for medication and therapeutic interventions upon initiation of aerosolized medication. 2. Physician will be contacted for respiratory rate (RR) greater than 35 breaths per minute. Therapy will be held for heart rate (HR) greater than 140 beats per minute, pending direction from physician. 3. Bronchodilators will be administered via Metered Dose Inhaler (MDI) with spacer when the following criteria are met:  a. Alert and cooperative     b. HR < 140 bpm  c. RR < 30 bpm                d. Can demonstrate a 2-3 second inspiratory hold  4. Bronchodilators will be administered via Hand Held Nebulizer ANNALISE [facility]) to patients when ANY of the following criteria are met  a. Incognizant or uncooperative          b. Patients treated with HHN at Home        c. Unable to demonstrate proper use of MDI with spacer     d. RR > 30 bpm   5. Bronchodilators will be delivered via Metered Dose Inhaler (MDI), HHN, Aerogen to intubated patients on mechanical ventilation. 6. Inhalation medication orders will be delivered and/or substituted as outlined below. Aerosolized Medications Ordering and Administration Guidelines:    1. All Medications will be ordered by a physician, and their frequency and/or modality will be adjusted as defined by the patients Respiratory Severity Index (RSI) score. 2. If the patient does not have documented COPD, consider discontinuing anticholinergics when RSI is less than 9.  3. If the bronchospasm worsens (increased RSI), then the bronchodilator frequency can be increased to a maximum of every 4 hours. If greater than every 4 hours is required, the physician will be contacted. 4. If the bronchospasm improves, the frequency of the bronchodilator can be decreased, based on the patient's RSI, but not less than home treatment regimen frequency.   5. Bronchodilator(s) will be discontinued if patient has a RSI less than 9 and has received no scheduled or as needed treatment for 72  Hrs. Patients Ordered on a Mucolytic Agent:    1. Must always be administered with a bronchodilator. 2. Discontinue if patient experiences worsened bronchospasm, or secretions have lessened to the point that the patient is able to clear them with a cough. Anti-inflammatory and Combination Medications:    1.  If the patient lacks prior history of lung disease, is not using inhaled anti-inflammatory medication at home, and lacks wheezing by examination or by history for at least 24 hours, contact physician for possible discontinuation

## 2020-12-22 NOTE — PLAN OF CARE
Problem: Pain:  Goal: Pain level will decrease  Description: Pain level will decrease  Outcome: Ongoing  Note: Patient's pain will continue to improve and continue to be managed per the [facility]. Patient will also use distraction and relaxation techniques to aid in pain relief. Problem: Falls - Risk of:  Goal: Will remain free from falls  Description: Will remain free from falls  Outcome: Ongoing  Note: Patient will remain free from falls. Patient will use call light to notify staff of needs prior to exiting the bed. Patient's bed will remain in lowest position with wheels locked and bed alarm engaged.

## 2020-12-22 NOTE — PROGRESS NOTES
List    Diagnosis Date Noted    Spinal stenosis, lumbar region with neurogenic claudication 12/21/2020    Age-related nuclear cataract, bilateral 08/17/2020    Deep vein thrombosis (DVT) of lower extremity ([facility] 08/17/2020    Morbidly obese ([facility] 08/17/2020    IFG (impaired fasting glucose) 03/01/2019    Benign prostatic hyperplasia with nocturia 02/28/2019    Obesity (BMI 30-39.9) 04/27/2018    Lumbar degenerative disc disease   SEVERE L45   04/27/2018    Spondylolisthesis at L4-L5 level 04/27/2018    Lumbar stenosis with neurogenic claudication 04/27/2018    Lumbar disc herniation with radiculopathy   L45 04/27/2018    Lumbar radiculopathy, acute  L L5 WORSE  04/27/2018    Spondylolisthesis, lumbar region 04/27/2018    S/P TKR (total knee replacement) 02/04/2015    CAD (coronary artery disease)     Hypertension     Hyperlipidemia     Malignant melanoma ([facility]        Assessment:  Patient is a 70 y.o. male s/p Procedure(s) (LRB):  L1-2 TRANSFORAMINAL LUMBAR INTERBODY FUSION, T10 PELVIS SCREW FIXATION / FUSION, [address] (N/A)  . (N/A)    Plan:  1. Neurologically stable  2. Neurologic exams frequency: Q4H  3. For change in exam MUST contact neurosurgery team along with critical care or primary team  4. TLSO Brace to be worn when OOB  - Brace does NOT need to be worn when sleeping, bathing or showering  - Brace pads to be cleaned with soap & water, air dried, and changed as needed  - OK to put gauze over incision to keep brace from rubbing, if needed  5. Antibiotics: Cefazolin x 3 doses post op  6. Drain: Continue drain until output <30 mL Q8H  7. Marley: DC within 24 hours post-op  8. Bowel Regimen: Glycolax and Senokot-S  9. Pain control: Tylenol & PRN Roxicodone and Dilaudid  10. Muscle spasms: Robaxin  11. Mobility: PT/OT as tolerates  12. Diet: Advance as tolerates  13. DVT Prophylaxis: SCD's & Lovenox in AM  14. Will follow inpatient.   Please call with any questions or decline in neurological status    DISPO: Remain inpatient    Patient was seen and examined with Dr. Brianne Hurst who agrees with above assessment and plan.      Electronically signed by: VY Bravo, 12/22/2020 8:34 AM  [phone]

## 2020-12-22 NOTE — PROGRESS NOTES
Pt is A&O, VSS. Tolerating diet well. Denies n/v at this time. PRN pain medications given this morning, pt states pain is currently controlled. Pt has yet to void on own, justice was pulled at 0630. All fall precautions in place. Will continue to monitor.

## 2020-12-22 NOTE — PROGRESS NOTES
Oxygen saturation improved post neb and the patient was able to be transitioned to high flow nasal cannula at 10L.

## 2020-12-22 NOTE — CARE COORDINATION
Case Management Assessment           Initial Evaluation                Date / Time of Evaluation: 12/22/2020 2:36 PM                 Assessment Completed by: Nicole Harding    Patient Name: Searcy Galeazzi     YOB: 1945  Diagnosis: Spinal stenosis of lumbar region, unspecified whether neurogenic claudication present [M48.061]  Sacroiliitis [facility]) [M46.1]  Spinal stenosis, lumbar region with neurogenic claudication [M48.062]     Date / Time: 12/21/2020  9:23 AM    Patient Admission Status: Inpatient    If patient is discharged prior to next notation, then this note serves as note for discharge by case management.      Current PCP: Alesia Hess Patient: No    Chart Reviewed: Yes  Patient/ Family Interviewed: Yes    Initial assessment completed at bedside with:     Hospitalization in the last 30 days: No    Emergency Contacts:  Extended Emergency Contact Information  Primary Emergency Contact: [facility] Phone: [phone]  Mobile Phone: [phone]  Relation: Spouse  Secondary Emergency Contact: [address] [facility] Phone: [phone]  Relation: Child    Advance Directives:   Code Status: Full [address]: No  Agent:   Contact Number:     Copy present: No     In paper Chart: No    Scanned into EMR No    Financial  Payor: MEDICARE / Plan: MEDICARE PART A AND B / Product Type: *No Product type* /     Pre-cert required for SNF: No    Pharmacy    [facility], [address] [facility]  [address] [facility] [address] [facility] [address]  Phone: [phone] Fax: [address] [facility] [address]  [address] [facility]  [address] [address]  Phone: [phone] Fax: [phone] [facility], [address] [facility] 157 [phone] - F [phone]  [facility] [address]  Phone: [phone] Fax: [phone]      Potential assistance Purchasing Medications:    Does Patient want to participate in local refill/ meds to beds program?:      Meds To Beds General Rules:  1. Can ONLY be done Monday- Friday between 8:30am-5pm  2. Prescription(s) must be in pharmacy by 3pm to be filled same day  3. Copy of patient's insurance/ prescription drug card and patient face sheet must be sent along with the prescription(s)  4. Cost of Rx cannot be added to hospital bill. If financial assistance is needed, please contact unit  or ;  or  CANNOT provide pharmacy voucher for patients co-pays  5.  Patients can then  the prescription on their way out of the hospital at discharge, or pharmacy can deliver to the bedside if staff is available. (payment due at time of pick-up or delivery - cash, check, or card accepted)     Able to afford home medications/ co-pay costs: No    ADLS  Support Systems:      PT AM-PAC:   /24  OT AM-PAC:   /24    [place]: home  Steps:     Plans to RETURN to current housing: No  Barriers to RETURNING to current housing:     [address]  Currently ACTIVE with [address]: No  Home Care Agency: Not Applicable    Currently ACTIVE with [place] on Aging: No  Passport/ Waiver: No  Passport/ Waiver Services: Not Applicable    :  Phone:       [address]  [facility] Provider:   Equipment:     Home Oxygen and [address] [place] prior to admission: [address]: Not Applicable  Other Respiratory Equipment:     Informed of need to bring portable home O2 tank on day of DISCHARGE for nursing to connect prior to leaving: No  Verbalized agreement/Understanding: No  Person to bring portable tank at discharge:     Dialysis  Active with HD/PD prior to admission: No  Nephrologist:     HD Center:  Not Applicable    DISCHARGE PLAN:  Disposition: Home- No Services Needed    Transportation PLAN for discharge: family     Factors facilitating achievement of predicted outcomes: Family support    Barriers to discharge: Pain    Additional Case Management Notes:   Patient here from home with wife. Plans to discharge to home. Patient stated his wife will transport. Patient declined any home care services. Stated he prefers outpatient therapy. Has an outpatient clinic near his home. Patient denied any questions or concerns regarding discharge plans. CM will continue to follow for any changes to discharge plans. The Plan for Transition of Care is related to the following treatment goals of Spinal stenosis of lumbar region, unspecified whether neurogenic claudication present [M48.061]  Sacroiliitis [facility]) [M46.1]  Spinal stenosis, lumbar region with neurogenic claudication [M48.062]    The Patient and/or patient representative Meseret Hayden and his family were provided with a choice of provider and agrees with the discharge plan Yes    Freedom of choice list was provided with basic dialogue that supports the patient's individualized plan of care/goals and shares the quality data associated with the providers.  Yes    Care Transition patient: Yes    Jose L Nuñez RN  [facility]  Case Management Department  Ph: [phone]   Fax: [phone]

## 2020-12-22 NOTE — PROGRESS NOTES
Physical Therapy    Facility/Department: [facility] 5T ORTHO/NEURO  Initial Assessment and treatment    NAME: Tish Santos  : 1945  MRN: [id number]    Date of Service: 2020    Discharge Recommendations:Karthik Tao scored a 17/24 on the AM-PAC short mobility form. Current research shows that an AM-PAC score of 18 or greater is typically associated with a discharge to the patient's home setting. Based on the patient's AM-PAC score and their current functional mobility deficits, it is recommended that the patient have 2-3 sessions per week of Physical Therapy at d/c to increase the patient's independence. At this time, this patient demonstrates the endurance and safety to discharge home with home vs OP services and a follow up treatment frequency of 2-3x/wk. Please see assessment section for further patient specific details. If patient discharges prior to next session this note will serve as a discharge summary. Please see below for the latest assessment towards goals. PT Equipment Recommendations  Equipment Needed: (has power chair, RW, [id number])    Assessment   Body structures, Functions, Activity limitations: Decreased functional mobility ; Decreased endurance  Assessment: Pt is a 69 y/o male who presents following L1-2 TRANSFORAMINAL LUMBAR INTERBODY FUSION, T10 PELVIS SCREW FIXATION / FUSION, [place] SACROIILIAC JOINT FUSION. Pt with increased pain, decreased activity tolerance, and difficulty with bed mobility, transfers, and ambulation. The pt is below his independent baseline with functional mobility and would benefit from further PT in the acute setting as well as upon discharge to improve his safety and independence with functional mobility.   Treatment Diagnosis: impaired mobility secondary to back surgery  Prognosis: Good  Decision Making: Medium Complexity  PT Education: Goals;PT Role;Plan of Care;Precautions;Gait Training;Transfer Training;Functional Mobility Training  Patient like to move. \"  Pain Screening  Patient Currently in Pain: Yes(8-9/10 in low back)  Vital Signs  Patient Currently in Pain: Yes(8-9/10 in low back)       Orientation  Orientation  Overall Orientation Status: Within Functional Limits  Social/Functional History  Social/Functional History  Lives With: Spouse(wife runs a dog rescue and is out and about for a while.)  Type of Home: House  Home Layout: One level  Home Access: Level entry  Bathroom Shower/Tub: Walk-in shower  Bathroom Toilet: Standard  Bathroom Equipment: Shower chair, Grab bars in shower, Grab bars around toilet  Home Equipment: Rolling walker, 4 wheeled walker, Cane, Electric scooter  ADL Assistance: Independent  Homemaking Assistance: (wife primary)  Ambulation Assistance: Independent(no AD inside; walker or cane in community depending on amount of walking required)  Transfer Assistance: Independent  Active : Yes  Occupation: Full time employment  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  Cognition   Cognition  Overall Cognitive Status: WFL    Objective                   Sensation  Overall Sensation Status: (prior to surgery some nerve pain down R LE)  Bed mobility  Supine to Sit: Minimal assistance(HOB elevated; assist for trunk with cueing for log roll and sequencing)  Scooting: Contact guard assistance(to EOB)  Transfers  Sit to Stand: Contact guard assistance  Stand to sit: Contact guard assistance  Comment: with cues for hand placement  Ambulation  Ambulation?: Yes  Ambulation 1  Surface: level tile  Device: Rolling Walker  Other Apparatus: (TLSO  on)  Assistance: Contact guard assistance  Quality of Gait: pt with slow narda and decreased step length bilaterally  Distance: 8' + 15'  Comments: pt steady with head down posture, no LOB, some lightheadedness initially.  SpO2 after ambulation was 90% both times and margaret to 94% quickly on room air  Stairs/Curb  Stairs?: No     Balance  Sitting - Static: Good  Sitting - Dynamic: Good  Standing - Static: Fair  Standing - Dynamic: Fair;-  Comments: pt able to sit EOB with good balance while TLSO donned      Treatment:  Functional mobility training, gait training, and pt education    Plan   Plan  Times per week: 5-7  Current Treatment Recommendations: Strengthening, Transfer Training, Endurance Training, Neuromuscular Re-education, Patient/Caregiver Education & Training, Balance Training, Gait Training, Functional Mobility Training, Safety Education & Training, Home Exercise Program, Equipment Evaluation, Education, & procurement  Safety Devices  Type of devices: Call light within reach, Chair alarm in place, Left in chair, Nurse notified    AM-PAC Score  AM-PAC Inpatient Mobility Raw Score : 17 (12/22/20 1622)  AM-PAC Inpatient T-Scale Score : 42.13 (12/22/20 1622)  Mobility Inpatient CMS 0-100% Score: 50.57 (12/22/20 1622)  Mobility Inpatient CMS G-Code Modifier : CK (12/22/20 1622)          Goals  Short term goals  Time Frame for Short term goals: By discharge  Short term goal 1: Pt will perform bed mobility with supervision  Short term goal 2: Pt will transfer to RW with supervision  Short term goal 3: Pt will ambulate with RW and supervision x 150'  Patient Goals   Patient goals :  To fix my back pain       Therapy Time   Individual Concurrent Group Co-treatment   Time In 1525         Time Out 1606         Minutes 41         Timed Code Treatment Minutes: 26 Minutes    Timed Code Treatment Minutes:  26 Minutes    Total Treatment Minutes:  41 minutes      Trenton Ch PT

## 2020-12-22 NOTE — PROGRESS NOTES
Patient's justice removed at 0626, patient tolerated well. Patient A/O x4, VSS, and pain is being managed per the [facility]. Fall precautions in place.

## 2020-12-22 NOTE — PROGRESS NOTES
From OR to pacu bay 10 accompanied by Herber Sahu and monitors applied. Pt arrives with oral airway in place and with labored breathing. Requires jaw thrust for 5 minutes on arrival.  O2 sat 88-91% on SFM at 10L. I contacted Dr. Shweta Whittaker at [place] about the state of the pts labored breathing and oxygen sat and a new order was received for Narcan. After administration of Narcan, he began to wake, the oral airway was removed at 1925 and the airway was suctioned. The patient's oxygen sat remained 88-90%. Per faces scale 0/10 pain.

## 2020-12-22 NOTE — PLAN OF CARE
Problem: Pain:  Goal: Pain level will decrease  Description: Pain level will decrease  12/22/2020 0808 by Cm Benavidez RN  Outcome: Ongoing  Pt is resting in bed at this time, PRN medications were given this morning. Ice in place. Will continue to monitor. Problem: Falls - Risk of:  Goal: Will remain free from falls  Description: Will remain free from falls  12/22/2020 0808 by Cm Benavidez RN  Outcome: Ongoing  Fall precautions in place. Bed is in lowest position, wheels locked, bed alarm on, non skid socks on. Call light and bedside table within reach. Pt calls out appropriately. Pt is up x1-2 with a gb and walker. Will continue to assess and monitor.

## 2020-12-22 NOTE — PROGRESS NOTES
Patient arrived from PACU to room 5526 with all belongings. Patient oriented to room with all questions answered. Patient A/O x4, VSS, and pain managed per MAR. Fall precautions in place.

## 2020-12-22 NOTE — PROGRESS NOTES
Occupational Therapy   Occupational Therapy Initial Assessment/Tx Note  Date: 2020   Patient Name: Alfred Ochoa  MRN: [id number]     : 1945    Date of Service: 2020  Assessment: Pt is doing well POD #1. He requires CGA to min assist for functional mobility. ADL assessment minimal today - will continue education POD #2. Anticipate d/c home with 24 hr A of wife and/or son. Discharge Recommendations: Alfred Ochoa scored a 18/24 on the AM-PAC ADL Inpatient form. Current research shows that an AM-PAC score of 18 or greater is typically associated with a discharge to the patient's home setting. Based on the patient's AM-PAC score, and their current ADL deficits, it is recommended that the patient have 2-3 sessions per week of Occupational Therapy at d/c to increase the patient's independence. At this time, this patient demonstrates the endurance and safety to discharge home with 24 hr A and a follow up treatment frequency of 2-3x/wk. Please see assessment section for further patient specific details. OT Equipment Recommendations  Equipment Needed: No    Assessment   Performance deficits / Impairments: Decreased functional mobility ; Decreased ADL status; Decreased endurance;Decreased balance;Decreased high-level IADLs  Treatment Diagnosis: Decreased activity tolerance, impaired ADLs and mobility  Decision Making: Medium Complexity  REQUIRES OT FOLLOW UP: Yes  Activity Tolerance  Activity Tolerance: Patient Tolerated treatment well  Activity Tolerance: Pain remained high but slightly improved with mobility. Pt on 3L O2 at rest. Mobility performed on RA with spO2 89-90% quickly increasing to 94% with rest. Flow decreased prior to exit, RN aware. Safety Devices  Safety Devices in place: Yes  Type of devices: Call light within reach; Chair alarm in place;Nurse notified; Left in chair(Pt and RN aware of limiting time in sitting, assist level on board)           Patient Diagnosis(es): There were Management    AM-PAC Score     AM-PAC Inpatient Daily Activity Raw Score: 18 (12/22/20 1621)  AM-PAC Inpatient ADL T-Scale Score : 38.66 (12/22/20 1621)  ADL Inpatient CMS 0-100% Score: 46.65 (12/22/20 1621)  ADL Inpatient CMS G-Code Modifier : CK (12/22/20 1621)    Goals  Short term goals  Time Frame for Short term goals: by D/C  Short term goal 1: Transfer to/from standard toilet SBA - Not met  Short term goal 2: Erminio Loach underwear/pants with SBA, AE as needed - Not met  Short term goal 3: Stand at sink spvn for grooming tasks - Not met  Short term goal 4: verb safe plan for bathing - Not met  Short term goal 5: Don/doff TLSO with set up - Not met  Patient Goals   Patient goals : to recover and get home       Therapy Time   Individual individual Group Co-treatment   Time In 1520  1400       Time Out 1604  1412       Minutes 44  12        Timed Code Tx Min: 41  TOtal Tx TIme: [address], OT

## 2020-12-22 NOTE — PROGRESS NOTES
4 Eyes Admission Assessment     I agree as the admission nurse that 2 RN's have performed a thorough Head to Toe Skin Assessment on the patient. ALL assessment sites listed below have been assessed on admission. Areas assessed by both nurses:   [x]   Head, Face, and Ears   [x]   Shoulders, Back, and Chest  [x]   Arms, Elbows, and Hands   [x]   Coccyx, Sacrum, and Ischium  [x]   Legs, Feet, and Heels        Does the Patient have Skin Breakdown?   No         Miguel Prevention initiated:  No   Wound Care Orders initiated:  No      [facility] nurse consulted for Pressure Injury (Stage 3,4, Unstageable, DTI, NWPT, and Complex wounds) or Miguel score 18 or lower:  No      Nurse 1 eSignature: Electronically signed by Eve Robbins RN on 12/22/20 at 4:25 AM EST    **SHARE this note so that the co-signing nurse is able to place an eSignature**    Nurse 2 eSignature: Electronically signed by Corazon Ford RN on 12/22/20 at 4:29 AM EST

## 2020-12-23 PROCEDURE — 1200000000 HC SEMI PRIVATE

## 2020-12-23 PROCEDURE — 6370000000 HC RX 637 (ALT 250 FOR IP): Performed by: PHYSICIAN ASSISTANT

## 2020-12-23 PROCEDURE — 97530 THERAPEUTIC ACTIVITIES: CPT

## 2020-12-23 PROCEDURE — 2580000003 HC RX 258: Performed by: NEUROLOGICAL SURGERY

## 2020-12-23 PROCEDURE — 6360000002 HC RX W HCPCS: Performed by: PHYSICIAN ASSISTANT

## 2020-12-23 PROCEDURE — 2580000003 HC RX 258: Performed by: PHYSICIAN ASSISTANT

## 2020-12-23 PROCEDURE — 6370000000 HC RX 637 (ALT 250 FOR IP): Performed by: NURSE PRACTITIONER

## 2020-12-23 PROCEDURE — 97535 SELF CARE MNGMENT TRAINING: CPT

## 2020-12-23 RX ORDER — 0.9 % SODIUM CHLORIDE 0.9 %
500 INTRAVENOUS SOLUTION INTRAVENOUS ONCE
Status: COMPLETED | OUTPATIENT
Start: 2020-12-23 | End: 2020-12-23

## 2020-12-23 RX ADMIN — SODIUM CHLORIDE 500 ML: 9 INJECTION, SOLUTION INTRAVENOUS at 11:32

## 2020-12-23 RX ADMIN — ACETAMINOPHEN 1000 MG: 500 TABLET, COATED ORAL at 15:22

## 2020-12-23 RX ADMIN — ENOXAPARIN SODIUM 40 MG: 40 INJECTION SUBCUTANEOUS at 09:57

## 2020-12-23 RX ADMIN — DOCUSATE SODIUM 50 MG AND SENNOSIDES 8.6 MG 2 TABLET: 8.6; 5 TABLET, FILM COATED ORAL at 20:24

## 2020-12-23 RX ADMIN — METHOCARBAMOL 1000 MG: 500 TABLET ORAL at 22:32

## 2020-12-23 RX ADMIN — ONDANSETRON 4 MG: 2 INJECTION INTRAMUSCULAR; INTRAVENOUS at 11:29

## 2020-12-23 RX ADMIN — METHOCARBAMOL 1000 MG: 500 TABLET ORAL at 09:58

## 2020-12-23 RX ADMIN — LISINOPRIL 20 MG: 20 TABLET ORAL at 09:58

## 2020-12-23 RX ADMIN — OXYCODONE 5 MG: 5 TABLET ORAL at 15:33

## 2020-12-23 RX ADMIN — TAMSULOSIN HYDROCHLORIDE 0.4 MG: 0.4 CAPSULE ORAL at 09:58

## 2020-12-23 RX ADMIN — ACETAMINOPHEN 1000 MG: 500 TABLET, COATED ORAL at 10:00

## 2020-12-23 RX ADMIN — Medication 10 ML: at 10:01

## 2020-12-23 RX ADMIN — METHOCARBAMOL 1000 MG: 500 TABLET ORAL at 06:20

## 2020-12-23 RX ADMIN — ACETAMINOPHEN 1000 MG: 500 TABLET, COATED ORAL at 22:31

## 2020-12-23 RX ADMIN — METHOCARBAMOL 1000 MG: 500 TABLET ORAL at 15:22

## 2020-12-23 RX ADMIN — AMLODIPINE BESYLATE 5 MG: 5 TABLET ORAL at 09:58

## 2020-12-23 RX ADMIN — DOCUSATE SODIUM 50 MG AND SENNOSIDES 8.6 MG 2 TABLET: 8.6; 5 TABLET, FILM COATED ORAL at 09:58

## 2020-12-23 RX ADMIN — POLYETHYLENE GLYCOL 3350 17 G: 17 POWDER, FOR SOLUTION ORAL at 09:57

## 2020-12-23 RX ADMIN — FENOFIBRATE 160 MG: 160 TABLET ORAL at 09:58

## 2020-12-23 RX ADMIN — DOXAZOSIN 8 MG: 4 TABLET ORAL at 09:58

## 2020-12-23 RX ADMIN — Medication 10 ML: at 20:32

## 2020-12-23 RX ADMIN — ATORVASTATIN CALCIUM 20 MG: 20 TABLET, FILM COATED ORAL at 09:58

## 2020-12-23 RX ADMIN — ACETAMINOPHEN 1000 MG: 500 TABLET, COATED ORAL at 02:37

## 2020-12-23 ASSESSMENT — PAIN DESCRIPTION - FREQUENCY
FREQUENCY: CONTINUOUS

## 2020-12-23 ASSESSMENT — PAIN DESCRIPTION - ORIENTATION
ORIENTATION: LOWER

## 2020-12-23 ASSESSMENT — PAIN DESCRIPTION - ONSET
ONSET: ON-GOING

## 2020-12-23 ASSESSMENT — PAIN DESCRIPTION - DESCRIPTORS
DESCRIPTORS: ACHING

## 2020-12-23 ASSESSMENT — PAIN DESCRIPTION - PROGRESSION
CLINICAL_PROGRESSION: NOT CHANGED

## 2020-12-23 ASSESSMENT — PAIN DESCRIPTION - PAIN TYPE
TYPE: SURGICAL PAIN
TYPE: ACUTE PAIN
TYPE: SURGICAL PAIN

## 2020-12-23 ASSESSMENT — PAIN SCALES - GENERAL
PAINLEVEL_OUTOF10: 4
PAINLEVEL_OUTOF10: 0
PAINLEVEL_OUTOF10: 2
PAINLEVEL_OUTOF10: 3
PAINLEVEL_OUTOF10: 0

## 2020-12-23 ASSESSMENT — PAIN DESCRIPTION - LOCATION
LOCATION: BACK

## 2020-12-23 ASSESSMENT — PAIN - FUNCTIONAL ASSESSMENT
PAIN_FUNCTIONAL_ASSESSMENT: ACTIVITIES ARE NOT PREVENTED

## 2020-12-23 NOTE — PLAN OF CARE
Problem: Pain:  Goal: Pain level will decrease  Description: Pain level will decrease  12/23/2020 1243 by Cynthia Izaguirre RN  Outcome: Ongoing  12/23/2020 0001 by Gibson Lombard, RN  Outcome: Ongoing     Problem: Falls - Risk of:  Goal: Will remain free from falls  Description: Will remain free from falls  12/23/2020 1243 by Cynthia Izaguirre RN  Outcome: Ongoing  12/23/2020 0001 by Gibson Lombard, RN  Outcome: Ongoing

## 2020-12-23 NOTE — PROGRESS NOTES
NEUROSURGERY PROGRESS NOTE    12/23/2020 10:44 AM                               Blanche Tao                      LOS: 2 days   POD#2 s/p Procedure(s) (LRB):  L1-2 TRANSFORAMINAL LUMBAR INTERBODY FUSION, T10 PELVIS SCREW FIXATION / FUSION, [place] SACROIILIAC JOINT FUSION (N/A)  . (N/A)    Subjective: Patient sitting up in bed upon entering the room. No acute events overnight. Patient was lightheaded after working with PT/OT this morning. Dr. Mata Green ordered a 500 mL bolus of NS. Physical Exam:  Patient seen and examined    Vitals:    12/23/20 1000   BP: 130/72   Pulse: 103   Resp:    Temp:    SpO2:      GCS:  4 - Opens eyes on own  5 - Alert and oriented  6 - Follows simple motor commands  General: Well developed. Alert and cooperative in no acute distress. HENT: atraumatic, neck supple  Eyes: Optic discs: Not tested  Pulmonary: unlabored respiratory effort  Cardiovascular:  Warm well perfused. No peripheral edema  Gastrointestinal: abdomen soft, NT, ND    Neurological:  Mental Status: Awake, alert, oriented x 4, speech clear and appropriate  Attention: Intact  Language: No aphasia or dysarthria noted  Sensation: Intact to all extremities to light touch  Coordination: Intact    Musculoskeletal:   Gait: Not tested   Assist devices: None   Tone: Normal  Motor strength:    Right  Left    Right  Left    Deltoid  5 5  Hip Flex  5 5   Biceps  5 5  Knee Extensors  5 5   Triceps  5 5  Knee Flexors  5 5   Wrist Ext  5 5  Ankle Dorsiflex. 5 5   Wrist Flex  5 5  Ankle Plantarflex. 5 5   Handgrip  5 5  Ext Harjeet Longus  5 5   Thumb Ext  5 5         Incision: CDI    Drain:   Left- 305 mL in past 24 hours  Right- 300 mL in past 24 hours    Radiological Findings:  Xr Thoracolumbar Spine (min 2 Views)  Result Date: 12/22/2020  Status post L24-odrbdty fusion.        Labs:  Recent Labs     12/22/20  0545   WBC 12.3*   HGB 12.8*   HCT 39.7*          No results for input(s): NA, K, CL, CO2, BUN, CREATININE, GLUCOSE,

## 2020-12-23 NOTE — PROGRESS NOTES
Pt was bladder scanned for 487 mL, straight cath'd per orders for 450 mLs. Resting in bed, will continue to monitor.

## 2020-12-23 NOTE — PROGRESS NOTES
Occupational Therapy  Daily Treatment Note  Patient Name: Hany Bhat  MRN: [id number]    Assessment: Pt progressing gradually. Pain somewhat decreased today. Pt requires SBA to CGA for functional mobility, which is slow and effortful but steady. Pt will need some assist with ADLs initially - wife to assist vs. Use of AE. Recommend initial 24 hr A. Discharge Recommendations: Hany Bhat scored a 18/24 on the AM-PAC ADL Inpatient form. Current research shows that an AM-PAC score of 18 or greater is typically associated with a discharge to the patient's home setting. Based on the patient's AM-PAC score, and their current ADL deficits, it is recommended that the patient have 2-3 sessions per week of Occupational Therapy at d/c to increase the patient's independence. At this time, this patient demonstrates the endurance and safety to discharge home with 24 hr A and a follow up treatment frequency of 2-3x/wk. Please see assessment section for further patient specific details. Equipment Needs: No     Chart Reviewed: Yes     Other position/activity restrictions: ambulate, TLSO brace to be worn when OOB: Brace does NOT need to be worn when sleeping, bathing or showering   Additional Pertinent Hx: Pt presents s/p L1-2 TRANSFORAMINAL LUMBAR INTERBODY FUSION, T10 PELVIS SCREW FIXATION / FUSION, [address] on 12/21. Prior to surgery he had chronic lumbar pain and right LE pain, numbness and tingling. Diagnosis: spinal stenosis lumbar region  Treatment Diagnosis: Decreased activity tolerance, impaired ADLs and mobility    Subjective: Pt in bed on entry. Pleasant and ready for OT. Reports pain is better than yesterday.      Pain: yes, unrated back pain, improved since yesterday, pt in comm with RN with pain needs    Objective:    Cognition/Orientation: WFL    Bed mobility   Supine to sit: CGA, HOB raised, rail use + OT's hand used for additional UE support  Scooting: SBA    Functional Mobility   Sit to Stand: SBA to CGA  Stand to Sit: SBA to CGA  Chair Transfer: SBA, walker  Commode Transfer: SBA, walker, B grab bars (effortful, heavy reliance on grab bars, pt planning to obtain a RTS for home)  Other: Functional mobility to/from and within bathroom for ADLs - SBA to CGA with rolling walker. Slow, effortful but steady. Reports mild lightheadedness approaching sink so performed grooming seated  Time in stance: 2 min + 1 min + 1 min + 2 min     ADLs   Grooming: Mod I, set up  Bathing: plans to use walk in shower and shower chair at home  UB dressing: pt assisted in changing gown; donned TLSO with mod assist  LB dressing: not assessed, pt will have wife's assist + has reacher and sock aide  Toileting: SBA    Activity Tolerance: Fair - on RA on entry eating breakfast, spO2 88%. Placed back on 4L for activity. spO2 94% after activity, reduced flow to 3L. Pt also has a wet cough. RN aware. Patient Education: Activity promotion, brace, transfers, ADLs, d/c planning - verb understanding    Safety Devices in Place: left in chair, alarm on, needs in reach, RN aware      Goals:  Short term goals  Time Frame for Short term goals: by D/C  Short term goal 1: Transfer to/from standard toilet SBA - Met 12/23; Transfer to/from RTS over toilet with spvn - Not met  Short term goal 2: [employer] underwear/pants with SBA, AE as needed - Not met  Short term goal 3: Stand at sink spvn for grooming tasks - Not met  Short term goal 4: verb safe plan for bathing - Met 12/23  Short term goal 5: Don/doff TLSO with set up - Not met         Plan:      Times per week: 5-7x   Times per day: Daily    If patient is discharged prior to next treatment, this note will serve as the discharge summary.     Therapy Time   Individual Concurrent Group Co-treatment   Time In 0823         Time Out 0903         Minutes 40             Timed Code Treatment Minutes: 40  Total Treatment Time: 36       Ronit Berumen, OT

## 2020-12-23 NOTE — PROGRESS NOTES
Pt is A&O, VSS. Tolerating diet well. Voiding per urinal. UP x1 with gb, walker and brace. States pain is well controlled. Denies n/v. Dressing is CD&I. Hemovacs in place with adequate output. All fall precautions in place. Will continue to monitor.

## 2020-12-23 NOTE — PROGRESS NOTES
Patient alert and oriented x4. VSS with exception to continued need for 4L O2. Incentive spirometry encouraged as well as ambulation. Surgical site is CDI. Neuro checks WNL. Patient denies need for pain medicine and nurse educated to patient importance of pain control. Patient checked on frequently throughout the night to offer patient pain medicine, patient denied at all times. Tolerating ambulation well. Brace on when OOB. Able to urinate on own at this time. Continued output from hemovacs. Fall precautions in place, will continue to monitor.

## 2020-12-23 NOTE — CARE COORDINATION
Case Management Assessment           Daily Note                 Date/ Time of Note: 12/23/2020 4:00 PM         Note completed by: [facility]    Patient Name: Tai Villela  YOB: 1945    Diagnosis:Spinal stenosis of lumbar region, unspecified whether neurogenic claudication present [M48.061]  Sacroiliitis [facility] [M46.1]  Spinal stenosis, lumbar region with neurogenic claudication [M48.062]  Patient Admission Status: Inpatient    Date of Admission:12/21/2020  9:23 AM Length of Stay: 2 GLOS:      Current Plan of Care: Home with [facility]  ________________________________________________________________________________________  PT AM-PAC: 16 / 24 per last evaluation on: 12/23    OT AM-PAC: 18 / 24 per last evaluation on: 12/23    DME Needs for discharge: pending  ________________________________________________________________________________________  Discharge Plan: Home with [address]: if rec/needed at discharge    Tentative discharge date: pending    Current barriers to discharge: medically ready    Referrals completed: Home Health Care: . Resources/ information provided: [address] List  ________________________________________________________________________________________  Case Management Notes:  Fr home with wife. Patient plans to discharge to home. Wife will transport when ready. CM reviewed therapy recommendations. Patient does not want any services at discharge. CM will continue to follow for any changes. Will set up [facility] if patient decides to accept it. Brandon King and his family were provided with choice of provider; he and his family are in agreement with the discharge plan.     Care Transition Patient: Yes    Emily Lu RN  [facility]  Case Management Department  Ph: [phone]  Fax: [phone]

## 2020-12-24 ENCOUNTER — APPOINTMENT (OUTPATIENT)
Dept: GENERAL RADIOLOGY | Age: 75
DRG: 456 | End: 2020-12-24
Attending: NEUROLOGICAL SURGERY
Payer: MEDICARE

## 2020-12-24 PROCEDURE — 2580000003 HC RX 258: Performed by: PHYSICIAN ASSISTANT

## 2020-12-24 PROCEDURE — 97535 SELF CARE MNGMENT TRAINING: CPT

## 2020-12-24 PROCEDURE — 97530 THERAPEUTIC ACTIVITIES: CPT

## 2020-12-24 PROCEDURE — 97116 GAIT TRAINING THERAPY: CPT

## 2020-12-24 PROCEDURE — 6370000000 HC RX 637 (ALT 250 FOR IP): Performed by: PHYSICIAN ASSISTANT

## 2020-12-24 PROCEDURE — 6370000000 HC RX 637 (ALT 250 FOR IP): Performed by: NURSE PRACTITIONER

## 2020-12-24 PROCEDURE — 71045 X-RAY EXAM CHEST 1 VIEW: CPT

## 2020-12-24 PROCEDURE — 6360000002 HC RX W HCPCS: Performed by: PHYSICIAN ASSISTANT

## 2020-12-24 PROCEDURE — 1200000000 HC SEMI PRIVATE

## 2020-12-24 RX ADMIN — METHOCARBAMOL 1000 MG: 500 TABLET ORAL at 12:23

## 2020-12-24 RX ADMIN — METHOCARBAMOL 1000 MG: 500 TABLET ORAL at 18:13

## 2020-12-24 RX ADMIN — DOCUSATE SODIUM 50 MG AND SENNOSIDES 8.6 MG 2 TABLET: 8.6; 5 TABLET, FILM COATED ORAL at 08:55

## 2020-12-24 RX ADMIN — DOCUSATE SODIUM 50 MG AND SENNOSIDES 8.6 MG 2 TABLET: 8.6; 5 TABLET, FILM COATED ORAL at 20:10

## 2020-12-24 RX ADMIN — METHOCARBAMOL 1000 MG: 500 TABLET ORAL at 06:07

## 2020-12-24 RX ADMIN — FENOFIBRATE 160 MG: 160 TABLET ORAL at 08:56

## 2020-12-24 RX ADMIN — ACETAMINOPHEN 1000 MG: 500 TABLET, COATED ORAL at 06:07

## 2020-12-24 RX ADMIN — Medication 10 ML: at 20:52

## 2020-12-24 RX ADMIN — METHOCARBAMOL 1000 MG: 500 TABLET ORAL at 22:10

## 2020-12-24 RX ADMIN — POLYETHYLENE GLYCOL 3350 17 G: 17 POWDER, FOR SOLUTION ORAL at 08:56

## 2020-12-24 RX ADMIN — ATORVASTATIN CALCIUM 20 MG: 20 TABLET, FILM COATED ORAL at 08:56

## 2020-12-24 RX ADMIN — ACETAMINOPHEN 1000 MG: 500 TABLET, COATED ORAL at 20:10

## 2020-12-24 RX ADMIN — ACETAMINOPHEN 1000 MG: 500 TABLET, COATED ORAL at 15:36

## 2020-12-24 RX ADMIN — OXYCODONE 5 MG: 5 TABLET ORAL at 12:56

## 2020-12-24 RX ADMIN — DOXAZOSIN 8 MG: 4 TABLET ORAL at 08:56

## 2020-12-24 RX ADMIN — OXYCODONE 5 MG: 5 TABLET ORAL at 20:27

## 2020-12-24 RX ADMIN — LISINOPRIL 20 MG: 20 TABLET ORAL at 08:56

## 2020-12-24 RX ADMIN — Medication 10 ML: at 08:57

## 2020-12-24 RX ADMIN — OXYCODONE 5 MG: 5 TABLET ORAL at 02:34

## 2020-12-24 RX ADMIN — AMLODIPINE BESYLATE 5 MG: 5 TABLET ORAL at 08:56

## 2020-12-24 RX ADMIN — ENOXAPARIN SODIUM 40 MG: 40 INJECTION SUBCUTANEOUS at 08:56

## 2020-12-24 ASSESSMENT — PAIN DESCRIPTION - ONSET
ONSET: ON-GOING

## 2020-12-24 ASSESSMENT — PAIN DESCRIPTION - FREQUENCY
FREQUENCY: CONTINUOUS

## 2020-12-24 ASSESSMENT — PAIN DESCRIPTION - PAIN TYPE
TYPE: SURGICAL PAIN

## 2020-12-24 ASSESSMENT — PAIN SCALES - GENERAL
PAINLEVEL_OUTOF10: 6
PAINLEVEL_OUTOF10: 5
PAINLEVEL_OUTOF10: 6
PAINLEVEL_OUTOF10: 5
PAINLEVEL_OUTOF10: 0
PAINLEVEL_OUTOF10: 9

## 2020-12-24 ASSESSMENT — PAIN DESCRIPTION - LOCATION
LOCATION: BACK

## 2020-12-24 ASSESSMENT — PAIN DESCRIPTION - DESCRIPTORS
DESCRIPTORS: ACHING

## 2020-12-24 ASSESSMENT — PAIN DESCRIPTION - ORIENTATION
ORIENTATION: LOWER;LEFT;RIGHT
ORIENTATION: LEFT;RIGHT;LOWER
ORIENTATION: LOWER;LEFT;RIGHT
ORIENTATION: LOWER

## 2020-12-24 ASSESSMENT — PAIN - FUNCTIONAL ASSESSMENT
PAIN_FUNCTIONAL_ASSESSMENT: PREVENTS OR INTERFERES SOME ACTIVE ACTIVITIES AND ADLS

## 2020-12-24 NOTE — PROGRESS NOTES
NEUROSURGERY PROGRESS NOTE    12/24/2020 6:12 AM                               Misa Tao                      LOS: 3 days   POD#3 s/p Procedure(s) (LRB):  L1-2 TRANSFORAMINAL LUMBAR INTERBODY FUSION, T10 PELVIS SCREW FIXATION / FUSION, [address] (N/A)  . (N/A)    Subjective: Patient sitting up in bed upon entering the room. No acute events overnight. Patient requiring 4L of O2 to keep sats above 92%. Will order CXR and consult hospitalist for medical management. Physical Exam:  Patient seen and examined    Vitals:    12/24/20 0251   BP: 112/62   Pulse: 86   Resp: 16   Temp: 97.9 °F (36.6 °C)   SpO2: 93%     GCS:  4 - Opens eyes on own  5 - Alert and oriented  6 - Follows simple motor commands  General: Well developed. Alert and cooperative in no acute distress. HENT: atraumatic, neck supple  Eyes: Optic discs: Not tested  Pulmonary: unlabored respiratory effort  Cardiovascular:  Warm well perfused. No peripheral edema  Gastrointestinal: abdomen soft, NT, ND    Neurological:  Mental Status: Awake, alert, oriented x 4, speech clear and appropriate  Attention: Intact  Language: No aphasia or dysarthria noted  Sensation: Intact to all extremities to light touch  Coordination: Intact    Musculoskeletal:   Gait: Not tested   Assist devices: None   Tone: Normal  Motor strength:    Right  Left    Right  Left    Deltoid  5 5  Hip Flex  5 5   Biceps  5 5  Knee Extensors  5 5   Triceps  5 5  Knee Flexors  5 5   Wrist Ext  5 5  Ankle Dorsiflex. 5 5   Wrist Flex  5 5  Ankle Plantarflex. 5 5   Handgrip  5 5  Ext Harjeet Longus  5 5   Thumb Ext  5 5         Incision: CDI    Drain:   Left- 160 mL in past 24 hours  Right- 70 mL in past 24 hours    Radiological Findings:  Xr Thoracolumbar Spine (min 2 Views)  Result Date: 12/22/2020  Status post K47-nzclryw fusion.        Labs:  Recent Labs     12/22/20  0545   WBC 12.3*   HGB 12.8*   HCT 39.7*          No results for input(s): NA, K, CL, CO2, BUN, CREATININE, GLUCOSE, CALCIUM, PHOS, MG in the last 72 hours. Recent Labs     12/21/20  1042   PROTIME 10.7   INR 0.92   APTT 28.6       Patient Active Problem List    Diagnosis Date Noted    Spinal stenosis, lumbar region with neurogenic claudication 12/21/2020    Age-related nuclear cataract, bilateral 08/17/2020    Deep vein thrombosis (DVT) of lower extremity ([facility] [address] 08/17/2020    Morbidly obese ([facility] [address] 08/17/2020    IFG (impaired fasting glucose) 03/01/2019    Benign prostatic hyperplasia with nocturia 02/28/2019    Obesity (BMI 30-39.9) 04/27/2018    Lumbar degenerative disc disease   SEVERE L45   04/27/2018    Spondylolisthesis at L4-L5 level 04/27/2018    Lumbar stenosis with neurogenic claudication 04/27/2018    Lumbar disc herniation with radiculopathy   L45 04/27/2018    Lumbar radiculopathy, acute  L L5 WORSE  04/27/2018    Spondylolisthesis, lumbar region 04/27/2018    S/P TKR (total knee replacement) 02/04/2015    CAD (coronary artery disease)     Hypertension     Hyperlipidemia     Malignant melanoma ([facility] [address]        Assessment:  Patient is a 70 y.o. male s/p Procedure(s) (LRB):  L1-2 TRANSFORAMINAL LUMBAR INTERBODY FUSION, T10 PELVIS SCREW FIXATION / FUSION, [address] (N/A)  . (N/A)    Plan:  1. Neurologically stable  2. Neurologic exams frequency: Q4H  3. For change in exam MUST contact neurosurgery team along with critical care or primary team  4. Hospitalist consulted for medical management  5. TLSO Brace to be worn when OOB  - Brace does NOT need to be worn when sleeping, bathing or showering  - Brace pads to be cleaned with soap & water, air dried, and changed as needed  - OK to put gauze over incision to keep brace from rubbing, if needed  6. Drain: DC right drain, but continue left drain until output <30 mL Q8H  7. Bowel Regimen: Glycolax and Senokot-S  8. Pain control: Tylenol & PRN Roxicodone and Dilaudid  9. Muscle spasms: Robaxin  10.  Mobility: PT/OT

## 2020-12-24 NOTE — PROGRESS NOTES
Patient is alert and oriented x4. Vital signs stable, remains on 4 L O2. Encouraged patient to use IS frequently throughout the day. No acute neuro changes throughout shift. Lower back pain controlled with PRN salo. Surgical site to lower back covered with medipore, remains CDI. L hemovac drain putting out bloody drainage. Tolerating diet well, denies n/v. Voiding without complications. Ambulating x1 assist with walker and gait belt. Fall precautions in place. Bed locked in lowest position with alarm on. Call light within reach and patient using appropriately. Currently resting in bed with no further needs. Will continue to monitor.

## 2020-12-24 NOTE — PROGRESS NOTES
Physical Therapy  Daily Treatment Note    Discharge Recommendations: Ubaldo Hughes scored a 17/24 on the AM-PAC short mobility form. Current research shows that an AM-PAC score of 18 or greater is typically associated with a discharge to the patient's home setting. Based on the patient's AM-PAC score and their current functional mobility deficits, it is recommended that the patient have 2-3 sessions per week of Physical Therapy at d/c to increase the patient's independence. At this time, this patient demonstrates the endurance and safety to discharge home with home PT and a follow up treatment frequency of 2-3x/wk. Please see assessment section for further patient specific details. Assessment:  Pt with improved gait tolerance this session. Mobility slow and effortful. Gait mildly unsteady as pt became more fatigued. Good effort and participation. Plan is for home at D/C with assist of family and continued PT. Pt would benefit from 24 hour assist initially. No new DME needs. HOME HEALTH CARE: LEVEL 1 STANDARD  - Initial home health evaluation to occur within 24-48 hours, in patient home   - Therapy to evaluate with goal of regaining prior level of functioning   - Therapy to evaluate if patient has [address] needs for personal care    Equipment Needs: None anticipated (pt has necessary DME)    Chart Reviewed: Yes     Other position/activity restrictions: ambulate, TLSO brace to be worn when OOB: Brace does NOT need to be worn when sleeping, bathing or showering   Additional Pertinent Hx: Pt presents s/p L1-2 TRANSFORAMINAL LUMBAR INTERBODY FUSION, T10 PELVIS SCREW FIXATION / FUSION, [address] on 12/21. Prior to surgery he had chronic lumbar pain and right LE pain, numbness and tingling.       Diagnosis: spinal stenosis of lumbar region, unspecified whether neurogenic claudication present   Treatment Diagnosis: impaired mobility secondary to back surgery    Subjective: Pt in bed initially. Agreeable to working with PT/OT. \"It feels good to be up. \"  Pt reports no stairs at home. Pain: 8/10 at start of session. 4/10 at end of session. RN aware. Objective:    Bed mobility  Supine to sit: CGA, HOB up partially with use of rail. Min cues for log roll. Scooting: CGA to EOB    Transfers  Sit to stand: Min assist x 1 from bed (1st trial); CGA from bed (2nd trial); CGA from commode with grab bar  Stand to sit: CGA onto commode with grab bar; CGA onto bed; CGA into chair  Other: Cues for hand placement with transfers when using walker (pushing up from seated surface and reaching back for seated surface as opposed to holding onto walker)    Ambulation  Assistance Level: CGA (progressing to Min assist towards end of longer walk)  Assistive device: Wheeled walker  Distance: 8 ft, 3 ft, 8 ft in room (to sink, toilet, bed); 110 ft in melgar  Quality of gait: Step-through pattern; narrow TALI; toe-out; effortful; moderate reliance on walker for support  Other: Pt needing Min assist towards end of longer walk due to fatigue/mild unsteadines    Balance  Sat EOB x 5+ minutes with SBA  Static stance with walker CGA. Also stood at sink with OT for ADL activities 5+ minutes with CGA  Ambulation with wheeled walker CGA to Min assist    Other  Pt's TLSO adjusted and donned while sitting EOB. Pt needing Max assist to don. Patient Education  Reviewed log roll for bed mobility. Demonstrated good understanding. Hand placement with transfers when using walker. Needing cues/reminders. Calling for assist with needs. Expressed understanding. Safety Devices  Pt left with needs in reach. In chair with chair alarm on. RN updated. AM-PAC score  AM-PAC Inpatient Mobility Raw Score : 17  AM-PAC Inpatient T-Scale Score : 42.13  Mobility Inpatient CMS 0-100% Score: 50.57  Mobility Inpatient CMS G-Code Modifier : CK    Goals: (as determined and assessed by primary PT)  Time Frame for Short term goals:  By discharge  Short term goal 1: Pt will perform bed mobility with supervision   Short term goal 2: Pt will transfer to RW with supervision   Short term goal 3: Pt will ambulate with RW and supervision x 150'      Plan:  Times per week: 5-7;    Current Treatment Recommendations: Strengthening, Transfer Training, Endurance Training, Neuromuscular Re-education, Patient/Caregiver Education & Training, Balance Training, Gait Training, Functional Mobility Training, Safety Education & Training, Home Exercise Program, Equipment Evaluation, Education, & procurement    Therapy Time    Individual  Concurrent  Group  Co-treatment    Time In  915            Time Out  959            Minutes  44              Timed Code Treatment Minutes: 44  Total Treatment Minutes: 44    Will continue per plan of care. If patient is discharged prior to next treatment, this note will serve as the discharge summary.     Israel Casas #[id number]

## 2020-12-24 NOTE — PLAN OF CARE
Problem: Pain:  Goal: Pain level will decrease  Description: Pain level will decrease  Outcome: Ongoing  Note: Patient endorsing surgical pain to lower back. Rating pain a 9/10. PRN salo administered per the [facility]. Only wanting to take 5 mg of salo for 9/10 because, per patient, \"it knocks me out\". Patient educated on medication, side effects, and verbalized understanding. Assisted with repositioning in bed for comfort. Notified when next dose of medication can be administered. Upon reassessment, patient resting in bed with eyes closed and respirations greater than 14. Will continue to monitor. Problem: Falls - Risk of:  Goal: Will remain free from falls  Description: Will remain free from falls  Outcome: Ongoing  Note: Patient will remain free from falls throughout shift. Ambulating x1 assist with walker and gait belt. Fall precautions in place. Non-skid socks on. Bed locked in lowest position with alarm on and 2/4 side rails up. Bedside table, belongings, and call light placed within reach. Patient calling out appropriately when needing assistance. Hourly rounding in anticipation of patient needs. Floor clean and free from clutter. Room door open. Will continue to monitor. Problem: Skin Integrity:  Goal: Will show no infection signs and symptoms  Description: Will show no infection signs and symptoms  Outcome: Ongoing  Note: Gauze/medipore to lower back incision remain CDI. No drainage or odor noted. Patient remains afebrile. Will continue to monitor.

## 2020-12-24 NOTE — CONSULTS
[address]     PCP: Nasrin Wood, APRN - CNP    Date of Admission: 12/21/2020    Date of Service: Pt seen/examined on 12/24/20   and consult was received    Chief Complaint:  Hypoxia and Dizziness after surgery. History Of Present Illness:      70 y.o. male with PMH of CAD , HLD, HTN, hx of DVT who presented to [facility] with Elective back surgery. He is POD 3 for L1-2 TRANSFORAMINAL LUMBAR INTERBODY FUSION, T10 PELVIS SCREW FIXATION / FUSION, BEDROCK SACROIILIAC JOINT FUSION (N/A). Patient had some hypoxia, and was feeling dizzy also had some urinary retention. Past Medical History:          Diagnosis Date    CAD (coronary artery disease)     Hearing loss     right    Hyperlipidemia     Hypertension     Melanoma ([address]     S/P coronary artery stent placement 2004       Past Surgical History:          Procedure Laterality Date    CATARACT REMOVAL WITH IMPLANT Right     also removed free floating blood blots per pt     COLONOSCOPY  01/2008    neg    COLONOSCOPY  2003    COLONOSCOPY  5/21/2014    diverticulosis-repeat 5 years    CYST REMOVAL Left 1976    foot    EYE SURGERY Left     laser surgery for torn retina     IVC FILTER INSERTION  2018    IVC FILTER REMOVAL  2018    JOINT REPLACEMENT      LUMBAR FUSION N/A 12/21/2020    L1-2 TRANSFORAMINAL LUMBAR INTERBODY FUSION, T10 PELVIS SCREW FIXATION / FUSION, BEDROCK SACROIILIAC JOINT FUSION performed by Jez Cadet. Shanna Ann MD at [facility] [address] N/A 04/27/2018    FACETECTOMY AND INSTRUMENTED FUSION L4/5    MOHS SURGERY  2006    Melanoma neck    POSTERIOR FUSION THORACIC SPINE N/A 12/21/2020    . performed by Jez Cadet.  Shanna Ann MD at [address]      2004    TONSILLECTOMY      TONSILLECTOMY AND ADENOIDECTOMY  1951    TOTAL KNEE ARTHROPLASTY Left 10/14    [facility]    WRIST GANGLION EXCISION Right 1979       Medications Prior to Admission:      Prior to Admission medications Medication Sig Start Date End Date Taking? Authorizing Provider   Multiple Vitamins-Minerals (MULTIVITAMIN ADULT PO) Take by mouth   Yes Historical Provider, MD   fenofibrate (TRICOR) 145 MG tablet TAKE 1 TABLET BY MOUTH EVERY DAY 12/11/20  Yes ADIN Huitron CNP   lisinopril (PRINIVIL;ZESTRIL) 20 MG tablet Take 1 tablet by mouth daily 11/25/20  Yes Jluis Sarmiento MD   amLODIPine (NORVASC) 5 MG tablet Take 1 tablet by mouth daily 11/25/20  Yes Jluis Sarmiento MD   doxazosin (CARDURA) 8 MG tablet TAKE 1 TABLET BY MOUTH EVERY DAY AT NIGHT 11/18/20  Yes ADIN Fernandez CNP   atorvastatin (LIPITOR) 20 MG tablet TAKE 1 TABLET BY MOUTH EVERY DAY 8/10/20  Yes ADIN Fernandez CNP   apixaban (ELIQUIS) 5 MG TABS tablet TAKE 1 TABLET BY MOUTH TWICE A DAY 5/15/20  Yes ADIN Fernandez CNP   metroNIDAZOLE (METROGEL) 0.75 % gel Apply topically 2 times daily. 9/29/18  Yes ADIN Fernandez CNP   vitamin D (CHOLECALCIFEROL) 1000 UNIT TABS tablet Take 1,000 Units by mouth daily   Yes Historical Provider, MD   Calcium Citrate 200 MG TABS Take 800 mg by mouth daily   Yes Historical Provider, MD   ascorbic acid (VITAMIN C) 500 MG tablet Take 500 mg by mouth daily. Yes Historical Provider, MD       Allergies:  Corticosteroids, Cortisone, Gabapentin, Other, Prednisolone, and Sulfa antibiotics    Social History:      The patient currently lives at home    TOBACCO:   reports that he quit smoking about 54 years ago. He has a 15.00 pack-year smoking history. He has never used smokeless tobacco.  ETOH:   reports no history of alcohol use. E-Cigarettes/Vaping Use     Questions Responses    E-Cigarette/Vaping Use Never User    Start Date     Passive Exposure     Quit Date     Counseling Given     Comments             Family History:       Reviewed in detail and negative for DM, CAD, Cancer, CVA.  Positive as follows:        Problem Relation Age of Onset    Heart Disease Mother 76    Heart Attack Mother     Other Mother         Smoker    Heart Disease Father 55    Heart Attack Father     Cancer Brother 35        colon    Crohn's Disease Brother     Heart Disease Paternal Grandfather 39    Heart Attack Paternal Grandfather        REVIEW OF SYSTEMS:   Pertinent positives as noted in the HPI. All other systems reviewed and negative. PHYSICAL EXAM PERFORMED:    /65   Pulse 86   Temp 97.6 °F (36.4 °C) (Oral)   Resp 16   Ht 5' 10\" (1.778 m)   Wt 260 lb (117.9 kg)   SpO2 92%   BMI 37.31 kg/m²     General appearance:  No apparent distress, appears stated age and cooperative. HEENT:  Normal cephalic, atraumatic without obvious deformity. Pupils equal, round, and reactive to light. Extra ocular muscles intact. Conjunctivae/corneas clear. Neck: Supple, with full range of motion. No jugular venous distention. Trachea midline. Respiratory:  Normal respiratory effort. Clear to auscultation, bilaterally without Rales/Wheezes/Rhonchi. Cardiovascular:  Regular rate and rhythm with normal S1/S2 without murmurs, rubs or gallops. Abdomen: Soft, non-tender, non-distended with normal bowel sounds. Musculoskeletal:  No clubbing, cyanosis or edema bilaterally. Full range of motion without deformity. Skin: Skin color, texture, turgor normal.  No rashes or lesions. Neurologic:  Neurovascularly intact without any focal sensory/motor deficits. Cranial nerves: II-XII intact, grossly non-focal.  Psychiatric:  Alert and oriented, thought content appropriate, normal insight  Capillary Refill: Brisk,< 3 seconds   Peripheral Pulses: +2 palpable, equal bilaterally       Labs:     Recent Labs     12/22/20  0545   WBC 12.3*   HGB 12.8*   HCT 39.7*        No results for input(s): NA, K, CL, CO2, BUN, CREATININE, CALCIUM, PHOS in the last 72 hours. Invalid input(s): MAGNES  No results for input(s): AST, ALT, BILIDIR, BILITOT, ALKPHOS in the last 72 hours.   Recent Labs     12/21/20  1042   INR 0.92     No results for input(s): Елена Hugo in the last 72 hours. Urinalysis:      Lab Results   Component Value Date    NITRU POSITIVE 08/17/2018    WBCUA 10-20 08/17/2018    BACTERIA 2+ 08/17/2018    RBCUA 3-5 08/17/2018    BLOODU moderate 05/04/2020    BLOODU TRACE-INTACT 08/17/2018    SPECGRAV 1.020 05/04/2020    SPECGRAV 1.010 08/17/2018    GLUCOSEU neg 05/04/2020    GLUCOSEU Negative 08/17/2018       Radiology:     CXR: I have reviewed the CXR with the following interpretation: reviewed seems stable  EKG:  I have reviewed the EKG with the following interpretation: none    XR CHEST PORTABLE   Final Result   Impression:       1. Slight blunting of the left costophrenic angle, nonspecific. Consider obtaining PA and lateral radiograph to assess for pleural effusion. 2. Streaky opacity in the right lung base, suspected to represent subsegmental atelectasis. XR THORACOLUMBAR SPINE (MIN 2 VIEWS)   Final Result   Impression:    Status post [id number] fusion. CT GUIDED STEREOTACTIC LOCALIZATION   Final Result      1. Intraoperative localizing images for T10-pelvis screw fixation. CT GUIDED STEREOTACTIC LOCALIZATION   Final Result      1. Intraoperative localizing images for T10-pelvis screw fixation.           ASSESSMENT:    Active Hospital Problems    Diagnosis Date Noted    Spinal stenosis, lumbar region with neurogenic claudication [M48.062] 12/21/2020         PLAN:    Low back pain s/p L1-2 lumbar fusion, T10 pelvis screw fixation and fusion, bedrock scraoiliac joint fusion  Post op pain  - pain management    Post op Hypoxia likely 2/2 atelectasis  - Incentive spirometry  - encourage PT/OT  - CXR no acute changes but showed right sided atelectasis    Dizziness 2/2 Medication interaction  - patient takes Cardura at home, was given flomax  - will stop flomax  - those 2 medication has similar mechanism of action    HTN  - continue norvasc, cardura, lisinipril    HLD  - statin, fenofibrate    CAD  - holding ASA, statin    Hx of DVT  - holding elquis at least for 10 days  - discussed with neurosurgery  - Lovenox for now    DVT Prophylaxis: lovenox  Diet: DIET GENERAL;  Code Status: Full Code    PT/OT Eval Status: on going    Dispo - wards       Felix Palacios DO    Thank you ADIN Coyle - KEENA for the opportunity to be involved in this patient's care. If you have any questions or concerns please feel free to contact me at [phone].

## 2020-12-24 NOTE — CARE COORDINATION
Patient here from home with wife. Plans to d/c to home with wife transporting. Patient declining any services. He is currently on O2,  Does not use at baseline. Will need home O2 eval if d/c with the oxygen. CM will continue to follow for any changes to discharge plans.   Kathrin Elaine RN  RN Case Manager  [phone]

## 2020-12-24 NOTE — PROGRESS NOTES
Physician Progress Note      PATIENT:               Yuni Salgado  CSN #:                  [id number]  :                       1945  ADMIT DATE:       2020 9:23 AM  DISCH DATE:  RESPONDING  PROVIDER #:        Bill Bourgeois CNP          QUERY TEXT:    Pt S/P Fusion. Patient requiring 4 LNC post op. Please document in progress   notes and discharge summary the cause and correlating clinical indicators to   support such: The medical record reflects the following:  Risk Factors: s/p Fusion  Clinical Indicators: Patient unable to be weaned off of 4 LNC. SAO2 90% on 4   LNC. was lightheaded after working with PT/OT this morning, Was previously on   high flow at 4 L with SAO2 90%. Incentive spirometer with 1500ml during day   time. No documented IS during night. RR 16. Treatment: High flow oxygen, Medical team consult  Options provided:  -- Acute Postoperative Pulmonary Insufficiency  -- Postoperative Respiratory failure  -- Other - I will add my own diagnosis  -- Disagree - Not applicable / Not valid  -- Disagree - Clinically unable to determine / Unknown  -- Refer to Clinical Documentation Reviewer    PROVIDER RESPONSE TEXT:    Patient has acute postoperative pulmonary insufficiency.     Query created by: Jean-Paul Fitch on 2020 9:06 AM      Electronically signed by:  Narcisa Bourgeois CNP 2020 9:36 AM

## 2020-12-24 NOTE — PROGRESS NOTES
Patient is alert and oriented. Vital signs are stable. Patient's pain is controlled with medication per MAR. Hemovacs remain in place. Patient ambulates x1 with a walker. Patient tolerates ambulation well. Patient voiding without complication. Bed is in the lowest position. Bed alarm is activated. Call light is within reach. Will continue to monitor and reassess.

## 2020-12-24 NOTE — PROGRESS NOTES
Occupational Therapy  Facility/Department: [facility] 5T ORTHO/NEURO  Daily Treatment Note  NAME: Yael Carmichael  : 1945  MRN: [id number]    Date of Service: 2020    Discharge Recommendations:    Yael Carmichael scored a 18/24 on the AM-PAC ADL Inpatient form. Current research shows that an AM-PAC score of 18 or greater is typically associated with a discharge to the patient's home setting. Based on the patient's AM-PAC score, and their current ADL deficits, it is recommended that the patient have 2-3 sessions per week of Occupational Therapy at d/c to increase the patient's independence. At this time, this patient demonstrates the endurance and safety to discharge home with 24hr assist and a follow up treatment frequency of 2-3x/wk. Please see assessment section for further patient specific details. If patient discharges prior to next session this note will serve as a discharge summary. Please see below for the latest assessment towards goals. Assessment    Pt progressing well this session but continues on 4L O2 maintaining 92% O2 sats. Functional mobility completed with RW and CGA to Min A. Transfers requiring CGA. Pt would benefit from 24hr assist upon discharge home in care of wife. Patient Diagnosis(es): There were no encounter diagnoses. has a past medical history of CAD (coronary artery disease), Hearing loss, Hyperlipidemia, Hypertension, [address] [address] and S/P coronary artery stent placement. has a past surgical history that includes Tonsillectomy; Tonsillectomy and adenoidectomy (); cyst removal (Left, ); Wrist ganglion excision (Right, ); Mohs surgery (); Colonoscopy (2008); Colonoscopy (); Colonoscopy (2014); joint replacement; Total knee arthroplasty (Left, 10/14); Percutaneous Transluminal Coronary Angio; eye surgery (Left); Cataract removal with implant (Right); lumbar laminectomy (N/A, 2018); IVC filter insertion ();  IVC filter removal (2018); lumbar fusion (N/A, 12/21/2020); and POSTERIOR FUSION THORACIC SPINE (N/A, 12/21/2020). Restrictions  Position Activity Restriction  Other position/activity restrictions: ambulate, TLSO brace to be worn when OOB: Brace does NOT need to be worn when sleeping, bathing or showering       Additional Pertinent Hx: Pt presents s/p L1-2 TRANSFORAMINAL LUMBAR INTERBODY FUSION, T10 PELVIS SCREW FIXATION / FUSION, [address] on 12/21. Prior to surgery he had chronic lumbar pain and right LE pain, numbness and tingling. Diagnosis: spinal stenosis lumbar region  Treatment Diagnosis: Decreased activity tolerance, impaired ADLs and mobility    Subjective:   Pt met supine in bed and agreeable to OT treatment    Pain:   8/10 at beginning of session decreasing to 3/10, Pt repositioned to comfort at end of session. Objective:    Cognition/Orientation:  WFL    Bed mobility   Rolling: CGA for log roll tech   Supine to sit:  CGA   Scooting: SBA for scooting to EOB    Functional Mobility   Sit to Stand: Min  A from EOB, CGA from EOB and CGA from toilet  Stand to Sit: CGA with VCs for hand placement  Bed to Chair Transfer: CGA with increased time   Commode Transfer: CGA with use of GB  Other:  Functional mobility to and from bathroom and down melgar with CGA progressing to Min A with fatigue. ADLs   Grooming: SBA for oral care and washing face in stance at sink  UB dressing: CGA donning gown and Mod A donning TLSO. TLSO adjusted for improved pt fit and pt educated on proper donning and doffing  Toileting: CGA for stedy stance for vivian care      Activity Tolerance:  Pt on 4L O2 throughout session with O2 monitored at 92% after ADLs in stance and functional mobiltiy. Patient Education:   Proper placement and donning/doffing sling, safe transfers, safe home set up - pt verb understanding.      Safety Devices in Place:  Pt left in chair with alarm on and call light in reach Plan  If pt discharges prior to next treatment, this note will serve as discharge summary  Plan  Times per week: 5-7x  Times per day: Daily  Current Treatment Recommendations: Balance Training, Functional Mobility Training, Patient/Caregiver Education & Training, Equipment Evaluation, Education, & procurement, Safety Education & Training, Self-Care / ADL, Pain Management           AM-PAC Score        AM-PAC Inpatient Daily Activity Raw Score: 18 (12/24/20 1004)  AM-PAC Inpatient ADL T-Scale Score : 38.66 (12/24/20 1004)  ADL Inpatient CMS 0-100% Score: 46.65 (12/24/20 1004)  ADL Inpatient CMS G-Code Modifier : CK (12/24/20 1004)    Goals (as determined and assessed by primary OT)  Short term goals  Time Frame for Short term goals: by D/C  Short term goal 1: Transfer to/from standard toilet SBA - Met 12/23;  Transfer to/from RTS over toilet with spvn - Not met  Short term goal 2: Dat Early underwear/pants with SBA, AE as needed - Not met  Short term goal 3: Stand at sink spvn for grooming tasks - Not met  Short term goal 4: verb safe plan for bathing - Met 12/23  Short term goal 5: Don/doff TLSO with set up - Not met  Patient Goals   Patient goals : to recover and get home       Therapy Time   Individual Concurrent Group Co-treatment   Time In 0905         Time Out 1000         Minutes 55         Timed Code Treatment Minutes: [address] [facility]

## 2020-12-25 PROCEDURE — 97116 GAIT TRAINING THERAPY: CPT

## 2020-12-25 PROCEDURE — 6370000000 HC RX 637 (ALT 250 FOR IP): Performed by: NURSE PRACTITIONER

## 2020-12-25 PROCEDURE — 97530 THERAPEUTIC ACTIVITIES: CPT

## 2020-12-25 PROCEDURE — 1200000000 HC SEMI PRIVATE

## 2020-12-25 PROCEDURE — 6360000002 HC RX W HCPCS: Performed by: PHYSICIAN ASSISTANT

## 2020-12-25 PROCEDURE — 97535 SELF CARE MNGMENT TRAINING: CPT

## 2020-12-25 PROCEDURE — 6370000000 HC RX 637 (ALT 250 FOR IP): Performed by: PHYSICIAN ASSISTANT

## 2020-12-25 PROCEDURE — 2580000003 HC RX 258: Performed by: PHYSICIAN ASSISTANT

## 2020-12-25 RX ADMIN — LISINOPRIL 20 MG: 20 TABLET ORAL at 07:36

## 2020-12-25 RX ADMIN — METHOCARBAMOL 1000 MG: 500 TABLET ORAL at 21:05

## 2020-12-25 RX ADMIN — METHOCARBAMOL 1000 MG: 500 TABLET ORAL at 04:25

## 2020-12-25 RX ADMIN — DOXAZOSIN 8 MG: 4 TABLET ORAL at 07:35

## 2020-12-25 RX ADMIN — OXYCODONE 5 MG: 5 TABLET ORAL at 09:40

## 2020-12-25 RX ADMIN — ATORVASTATIN CALCIUM 20 MG: 20 TABLET, FILM COATED ORAL at 07:36

## 2020-12-25 RX ADMIN — METHOCARBAMOL 1000 MG: 500 TABLET ORAL at 16:56

## 2020-12-25 RX ADMIN — ACETAMINOPHEN 1000 MG: 500 TABLET, COATED ORAL at 01:05

## 2020-12-25 RX ADMIN — METHOCARBAMOL 1000 MG: 500 TABLET ORAL at 09:40

## 2020-12-25 RX ADMIN — OXYCODONE 5 MG: 5 TABLET ORAL at 13:27

## 2020-12-25 RX ADMIN — AMLODIPINE BESYLATE 5 MG: 5 TABLET ORAL at 07:36

## 2020-12-25 RX ADMIN — ACETAMINOPHEN 1000 MG: 500 TABLET, COATED ORAL at 21:05

## 2020-12-25 RX ADMIN — DOCUSATE SODIUM 50 MG AND SENNOSIDES 8.6 MG 2 TABLET: 8.6; 5 TABLET, FILM COATED ORAL at 07:35

## 2020-12-25 RX ADMIN — OXYCODONE 5 MG: 5 TABLET ORAL at 04:40

## 2020-12-25 RX ADMIN — Medication 10 ML: at 07:37

## 2020-12-25 RX ADMIN — ENOXAPARIN SODIUM 40 MG: 40 INJECTION SUBCUTANEOUS at 07:37

## 2020-12-25 RX ADMIN — Medication 10 ML: at 21:07

## 2020-12-25 RX ADMIN — ACETAMINOPHEN 1000 MG: 500 TABLET, COATED ORAL at 07:34

## 2020-12-25 RX ADMIN — OXYCODONE 5 MG: 5 TABLET ORAL at 21:06

## 2020-12-25 RX ADMIN — FENOFIBRATE 160 MG: 160 TABLET ORAL at 07:33

## 2020-12-25 ASSESSMENT — PAIN DESCRIPTION - FREQUENCY
FREQUENCY: CONTINUOUS
FREQUENCY: CONTINUOUS

## 2020-12-25 ASSESSMENT — PAIN DESCRIPTION - ORIENTATION
ORIENTATION: MID;POSTERIOR
ORIENTATION: MID;POSTERIOR

## 2020-12-25 ASSESSMENT — PAIN SCALES - GENERAL
PAINLEVEL_OUTOF10: 5
PAINLEVEL_OUTOF10: 4
PAINLEVEL_OUTOF10: 6
PAINLEVEL_OUTOF10: 4
PAINLEVEL_OUTOF10: 6
PAINLEVEL_OUTOF10: 6

## 2020-12-25 ASSESSMENT — PAIN DESCRIPTION - ONSET
ONSET: ON-GOING
ONSET: GRADUAL

## 2020-12-25 ASSESSMENT — PAIN - FUNCTIONAL ASSESSMENT
PAIN_FUNCTIONAL_ASSESSMENT: PREVENTS OR INTERFERES SOME ACTIVE ACTIVITIES AND ADLS
PAIN_FUNCTIONAL_ASSESSMENT: PREVENTS OR INTERFERES SOME ACTIVE ACTIVITIES AND ADLS

## 2020-12-25 ASSESSMENT — PAIN DESCRIPTION - LOCATION
LOCATION: BACK
LOCATION: BACK

## 2020-12-25 ASSESSMENT — PAIN DESCRIPTION - PAIN TYPE
TYPE: ACUTE PAIN;SURGICAL PAIN
TYPE: SURGICAL PAIN

## 2020-12-25 ASSESSMENT — PAIN DESCRIPTION - DESCRIPTORS
DESCRIPTORS: ACHING;CONSTANT
DESCRIPTORS: SHARP;ACHING

## 2020-12-25 ASSESSMENT — PAIN DESCRIPTION - PROGRESSION
CLINICAL_PROGRESSION: GRADUALLY WORSENING
CLINICAL_PROGRESSION: GRADUALLY WORSENING

## 2020-12-25 NOTE — PROGRESS NOTES
Occupational Therapy  Facility/Department: [facility] 5T ORTHO/NEURO  Daily Treatment Note  NAME: Jesus Carter  : 1945  MRN: [id number]    Date of Service: 2020    Discharge Recommendations:    Jesus Carter scored a 16/24 on the AM-PAC ADL Inpatient form. Current research shows that an AM-PAC score of 18 or greater is typically associated with a discharge to the patient's home setting. Based on the patient's AM-PAC score, and their current ADL deficits, it is recommended that the patient have 2-3 sessions per week of Occupational Therapy at d/c to increase the patient's independence. At this time, this patient demonstrates the endurance and safety to discharge home with  home with home services and a follow up treatment frequency of 2-3x/wk. Please see assessment section for further patient specific details. If patient discharges prior to next session this note will serve as a discharge summary. Please see below for the latest assessment towards goals. Assessment   Performance deficits / Impairments: Decreased functional mobility ; Decreased ADL status; Decreased endurance;Decreased balance;Decreased high-level IADLs  Assessment: Pt progressing. Cont to req CG for mobility and increased assist for ADl. Pt plans to go home at discharge. Cont OT tx per plan of care. Treatment Diagnosis: Decreased activity tolerance, impaired ADLs and mobility  Prognosis: Good  OT Education: OT Role;Precautions; ADL Adaptive Strategies;Transfer Training  Patient Education: Pt demonstrates understanding  REQUIRES OT FOLLOW UP: Yes  Activity Tolerance  Activity Tolerance: Patient limited by fatigue  Safety Devices  Safety Devices in place: Yes  Type of devices: Left in chair;Call light within reach; Chair alarm in place;Nurse notified         Restrictions  Position Activity Restriction  Other position/activity restrictions: ambulate, TLSO brace to be worn when OOB: Brace does NOT need to be worn when sleeping, bathing or showering  Subjective   General  Chart Reviewed: Yes  Patient assessed for rehabilitation services?: Yes  Additional Pertinent Hx: 70 y.o. M admitted 12/21 for L1-2 TRANSFORAMINAL LUMBAR INTERBODY FUSION, T10 PELVIS SCREW FIXATION/FUSION, BEDROCK SACROIILIAC JOINT FUSION. PMHx includes CAD, back surgery, IVC filter, L TKA  Family / Caregiver Present: No  Referring Practitioner: Marvin Guardado  Diagnosis: spinal stenosis lumbar region  Subjective  Subjective: Pt in bed upon entry. Pt agreeable to OOB activity.   Vital Signs  Patient Currently in Pain: Yes(4/10)   Orientation  Orientation  Overall Orientation Status: Within Functional Limits  Objective    ADL  Grooming: Stand by assistance(washing hands standing at sink)  Toileting: Maximum assistance(Req assist for toileting hygiene after BM (clothing management not performed))        Balance  Sitting Balance: Independent  Standing Balance: Contact guard assistance(to SBA)  Standing Balance  Time: ~3 min x2  Activity: Bathroom mobility/activity  Functional Mobility  Functional - Mobility Device: Rolling Walker  Activity: To/from bathroom  Assist Level: Contact guard assistance  Toilet Transfers  Toilet - Technique: Ambulating  Equipment Used: Standard bedside commode(over toilet)  Bed mobility  Supine to Sit: Stand by assistance(HOB elevated, use of rail, effortful)  Scooting: Stand by assistance  Transfers  Stand Step Transfers: Contact guard assistance  Sit to stand: Contact guard assistance  Stand to sit: Contact guard assistance                       Cognition  Overall Cognitive Status: [facility]                                         Plan   Plan  Times per week: 5-7x  Times per day: Daily  Current Treatment Recommendations: Balance Training, Functional Mobility Training, Patient/Caregiver Education & Training, Equipment Evaluation, Education, & procurement, Safety Education & Training, Self-Care / ADL, Pain Management  AM-PAC Score        AM-PAC Inpatient Daily Activity Raw Score: 16 (12/25/20 1234)  AM-PAC Inpatient ADL T-Scale Score : 35.96 (12/25/20 1234)  ADL Inpatient CMS 0-100% Score: 53.32 (12/25/20 1234)  ADL Inpatient CMS G-Code Modifier : CK (12/25/20 1234)    Goals  Short term goals  Time Frame for Short term goals: by D/C  Short term goal 1: Transfer to/from standard toilet SBA - Met 12/23;  Transfer to/from RTS over toilet with spvn - Not met  Short term goal 2: Patrizia underwear/pants with SBA, AE as needed - Not met  Short term goal 3: Stand at sink spvn for grooming tasks - Not met  Short term goal 4: verb safe plan for bathing - Met 12/23  Short term goal 5: Don/doff TLSO with set up - Not met  Patient Goals   Patient goals : to recover and get home       Therapy Time   Individual Concurrent Group Co-treatment   Time In 1115         Time Out 1146         Minutes 31         Timed Code Treatment Minutes: [address], [place]/L [address]

## 2020-12-25 NOTE — PROGRESS NOTES
Cleansed incision with soap and water then painted with CHG swab. When taking off previous dressing there was two skin tears on surrounding skin. Offered to spray with nonsting but refused. Placed tegaderm over hemovac site. Currently denies stinging of skin tears.

## 2020-12-25 NOTE — PROGRESS NOTES
Hospitalist Progress Note      PCP: ADIN Mccoy - CNP    Date of Admission: 12/21/2020    Chief Complaint: Hypoxia and Dizziness after surgery. Hospital Course: 70 y.o. male with PMH of CAD , HLD, HTN, hx of DVT who presented to [facility] with Elective back surgery. He is POD 3 for L1-2 TRANSFORAMINAL LUMBAR INTERBODY FUSION, T10 PELVIS SCREW FIXATION / FUSION, BEDROCK SACROIILIAC JOINT FUSION (N/A). Patient had some hypoxia, and was feeling dizzy also had some urinary retention.         Subjective: feels better today       Medications:  Reviewed    Infusion Medications   Scheduled Medications    methocarbamol  1,000 mg Oral Q6H    acetaminophen  1,000 mg Oral Q6H    sennosides-docusate sodium  2 tablet Oral BID    amLODIPine  5 mg Oral Daily    atorvastatin  20 mg Oral Daily    doxazosin  8 mg Oral Daily    fenofibrate  160 mg Oral Daily    lisinopril  20 mg Oral Daily    sodium chloride flush  10 mL Intravenous 2 times per day    enoxaparin  40 mg Subcutaneous Daily     PRN Meds: oxyCODONE **OR** oxyCODONE, sodium chloride flush, promethazine **OR** ondansetron, naloxone, albuterol      Intake/Output Summary (Last 24 hours) at 12/25/2020 1018  Last data filed at 12/25/2020 0740  Gross per 24 hour   Intake 140 ml   Output 565 ml   Net -425 ml       Physical Exam Performed:    BP (!) 141/75   Pulse 86   Temp 98.1 °F (36.7 °C) (Oral)   Resp 16   Ht 5' 10\" (1.778 m)   Wt 260 lb (117.9 kg)   SpO2 94%   BMI 37.31 kg/m²     General appearance: No apparent distress, appears stated age and cooperative. HEENT: Pupils equal, round, and reactive to light. Conjunctivae/corneas clear. Neck: Supple, with full range of motion. No jugular venous distention. Trachea midline. Respiratory:  Normal respiratory effort. Clear to auscultation, bilaterally without Rales/Wheezes/Rhonchi. Cardiovascular: Regular rate and rhythm with normal S1/S2 without murmurs, rubs or gallops.   Abdomen: Assessment/Plan:    Active Hospital Problems    Diagnosis    Spinal stenosis, lumbar region with neurogenic claudication [M48.062]     Low back pain s/p L1-2 lumbar fusion, T10 pelvis screw fixation and fusion, bedrock scraoiliac joint fusion  Post op pain  - pain management     Post op Hypoxia likely 2/2 atelectasis  - Incentive spirometry  - encourage PT/OT  - CXR no acute changes but showed right sided atelectasis     Dizziness 2/2 Medication interaction  - patient takes Cardura at home, was given flomax  - will stop flomax  - those 2 medication has similar mechanism of action    Urinary retention  - seem to have resolved now     HTN  - continue norvasc, cardura, lisinipril     HLD  - statin, fenofibrate     CAD  - holding ASA, statin     Hx of DVT  - holding elquis at least for 10 days  - discussed with neurosurgery  - Lovenox for now     DVT Prophylaxis: lovenox  Diet: DIET GENERAL;  Code Status: Full Code     PT/OT Eval Status: on going     Dispo - wards     Seema Ureña DO

## 2020-12-25 NOTE — PROGRESS NOTES
Secure message sent to Dr. Chris Cifuentes asking to call wife to update her. Wife has a lot of questions regarding this patient's oxygenation requirements. Was on 4 liters but still needing 1 liter. Not able to come off of it yet. Encouraging IS 10 times every hour. Cxray done other day. Her name and phone number given to doctor.

## 2020-12-25 NOTE — PROGRESS NOTES
Physical Therapy    Daily Treatment Note      Discharge Recommendations:  Wanda Haddad scored a 18/24 on the AM-PAC short mobility form. Current research shows that an AM-PAC score of 18 or greater is typically associated with a discharge to the patient's home setting. Based on the patient's AM-PAC score and their current functional mobility deficits, it is recommended that the patient have 2-3 sessions per week of Physical Therapy at d/c to increase the patient's independence. At this time, this patient demonstrates the endurance and safety to discharge home with Home PT. Please see assessment section for further patient specific details. .   Assessment:  Gait is slow, but steady using rolling walker. Increased transfers today. Pt plans home with assist from family. Pt voices no concerns returning home since he has had similar surgeries in the past.  Will follow. Equipment Needs:  Defer       Chart Reviewed: Yes   Other position/activity restrictions: ambulate, TLSO brace to be worn when OOB: Brace does NOT need to be worn when sleeping, bathing or showering   Additional Pertinent Hx: Pt presents s/p L1-2 TRANSFORAMINAL LUMBAR INTERBODY FUSION, T10 PELVIS SCREW FIXATION / FUSION, [address] on 12/21. Prior to surgery he had chronic lumbar pain and right LE pain, numbness and tingling. Diagnosis: spinal stenosis of lumbar region, unspecified whether neurogenic claudication present   Treatment Diagnosis: impaired mobility secondary to back surgery      Subjective:  Pt found supine in bed bed. \"I guess I can get up. \"    Pain:  Denies      Objective:      Bed mobility  Supine to sit:  SBA (HOB raised, use of rail, log roll technique, increased effort and time)    Transfers  Sit to stand:  SBA (from EOB to walker, cues for safety, increased time)  Stand to sit:  SBA (cues for controlled descent)    Ambulation  Assistance Level:  CGA  Assistive device:  Rolling walker  Distance: 10ft + 5ft x2 + 20ft   Quality of gait:  Slow gait, slightly flexed posture, effortful, steady  Other:  TLSO in place    Neuro/balance  Static stance:  SBA with UE support at sink x10 min activity  Dynamic stance:  CGA using rolling walker for functional mobility    Patient Education  Role of PT. Pt verbalized understanding. Safety Devices  Pt left with needs in reach, seated in chair with alarm in place and RN aware. AM-PAC score  AM-PAC Inpatient Mobility Raw Score : 18  AM-PAC Inpatient T-Scale Score : 43.63  Mobility Inpatient CMS 0-100% Score: 46.58  Mobility Inpatient CMS G-Code Modifier : CK    Goals:  Goals not met this treatment, continue with current goals. Time Frame for Short term goals: By discharge  Short term goal 1: Pt will perform bed mobility with supervision   Short term goal 2: Pt will transfer to RW with supervision   Short term goal 3: Pt will ambulate with RW and supervision x 150'          Plan:  Times per week: 5-7;    Current Treatment Recommendations: Strengthening, Transfer Training, Endurance Training, Neuromuscular Re-education, Patient/Caregiver Education & Training, Balance Training, Gait Training, Functional Mobility Training, Safety Education & Training, Home Exercise Program, Equipment Evaluation, Education, & procurement    Therapy Time    Individual  Concurrent  Group  Co-treatment    Time In  1106            Time Out  1145            Minutes  39              Timed Code Treatment Minutes:  39  Total Treatment Minutes:  39      If patient is discharged prior to next treatment, this note will serve as the discharge summary.     Aric Blunt PT

## 2020-12-25 NOTE — PROGRESS NOTES
Patient lost (1) 5mg oxycodone in room when trying to admin. Pill found on floor soiled. Pill wasted with another RN and replacement given. No needs expressed at this time.  Will continue to monitor and reassess

## 2020-12-25 NOTE — PLAN OF CARE
Problem: Pain:  Goal: Pain level will decrease  Description: Pain level will decrease  12/24/2020 1924 by Precious Moreno RN  Outcome: Ongoing     Problem: Falls - Risk of:  Goal: Will remain free from falls  Description: Will remain free from falls  12/24/2020 1924 by Precious Moreno RN  Outcome: Ongoing     Problem: Skin Integrity:  Goal: Will show no infection signs and symptoms  Description: Will show no infection signs and symptoms  12/24/2020 1924 by Precious Moreno RN  Outcome: Ongoing

## 2020-12-25 NOTE — PROGRESS NOTES
Patient on 2L of O2 at this time, down from 4L. Patient tolerating weaning of oxygen well.  Will continue to monitor and reassess

## 2020-12-26 ENCOUNTER — APPOINTMENT (OUTPATIENT)
Dept: GENERAL RADIOLOGY | Age: 75
DRG: 456 | End: 2020-12-26
Attending: NEUROLOGICAL SURGERY
Payer: MEDICARE

## 2020-12-26 VITALS
HEART RATE: 91 BPM | SYSTOLIC BLOOD PRESSURE: 122 MMHG | WEIGHT: 260 LBS | BODY MASS INDEX: 37.22 KG/M2 | HEIGHT: 70 IN | TEMPERATURE: 98 F | RESPIRATION RATE: 16 BRPM | OXYGEN SATURATION: 93 % | DIASTOLIC BLOOD PRESSURE: 64 MMHG

## 2020-12-26 LAB
A/G RATIO: 1.4 (ref 1.1–2.2)
ALBUMIN SERPL-MCNC: 3.4 G/DL (ref 3.4–5)
ALP BLD-CCNC: 40 U/L (ref 40–129)
ALT SERPL-CCNC: 58 U/L (ref 10–40)
ANION GAP SERPL CALCULATED.3IONS-SCNC: 8 MMOL/L (ref 3–16)
AST SERPL-CCNC: 49 U/L (ref 15–37)
BASOPHILS ABSOLUTE: 0.1 K/UL (ref 0–0.2)
BASOPHILS RELATIVE PERCENT: 0.7 %
BILIRUB SERPL-MCNC: 0.4 MG/DL (ref 0–1)
BUN BLDV-MCNC: 22 MG/DL (ref 7–20)
CALCIUM SERPL-MCNC: 8.6 MG/DL (ref 8.3–10.6)
CHLORIDE BLD-SCNC: 101 MMOL/L (ref 99–110)
CO2: 30 MMOL/L (ref 21–32)
CREAT SERPL-MCNC: 1.2 MG/DL (ref 0.8–1.3)
EOSINOPHILS ABSOLUTE: 0.2 K/UL (ref 0–0.6)
EOSINOPHILS RELATIVE PERCENT: 2.9 %
GFR AFRICAN AMERICAN: >60
GFR NON-AFRICAN AMERICAN: 59
GLOBULIN: 2.4 G/DL
GLUCOSE BLD-MCNC: 147 MG/DL (ref 70–99)
HCT VFR BLD CALC: 30.5 % (ref 40.5–52.5)
HEMOGLOBIN: 10.3 G/DL (ref 13.5–17.5)
LYMPHOCYTES ABSOLUTE: 1.5 K/UL (ref 1–5.1)
LYMPHOCYTES RELATIVE PERCENT: 22.2 %
MCH RBC QN AUTO: 32.6 PG (ref 26–34)
MCHC RBC AUTO-ENTMCNC: 33.9 G/DL (ref 31–36)
MCV RBC AUTO: 96.2 FL (ref 80–100)
MONOCYTES ABSOLUTE: 0.7 K/UL (ref 0–1.3)
MONOCYTES RELATIVE PERCENT: 10.8 %
NEUTROPHILS ABSOLUTE: 4.3 K/UL (ref 1.7–7.7)
NEUTROPHILS RELATIVE PERCENT: 63.4 %
PDW BLD-RTO: 12.3 % (ref 12.4–15.4)
PLATELET # BLD: 209 K/UL (ref 135–450)
PMV BLD AUTO: 7.4 FL (ref 5–10.5)
POTASSIUM REFLEX MAGNESIUM: 4.5 MMOL/L (ref 3.5–5.1)
RBC # BLD: 3.17 M/UL (ref 4.2–5.9)
SODIUM BLD-SCNC: 139 MMOL/L (ref 136–145)
TOTAL PROTEIN: 5.8 G/DL (ref 6.4–8.2)
WBC # BLD: 6.7 K/UL (ref 4–11)

## 2020-12-26 PROCEDURE — 80053 COMPREHEN METABOLIC PANEL: CPT

## 2020-12-26 PROCEDURE — 6370000000 HC RX 637 (ALT 250 FOR IP): Performed by: PHYSICIAN ASSISTANT

## 2020-12-26 PROCEDURE — 36415 COLL VENOUS BLD VENIPUNCTURE: CPT

## 2020-12-26 PROCEDURE — 2580000003 HC RX 258: Performed by: PHYSICIAN ASSISTANT

## 2020-12-26 PROCEDURE — 6360000002 HC RX W HCPCS: Performed by: PHYSICIAN ASSISTANT

## 2020-12-26 PROCEDURE — 97116 GAIT TRAINING THERAPY: CPT

## 2020-12-26 PROCEDURE — 71045 X-RAY EXAM CHEST 1 VIEW: CPT

## 2020-12-26 PROCEDURE — 85025 COMPLETE CBC W/AUTO DIFF WBC: CPT

## 2020-12-26 PROCEDURE — 97530 THERAPEUTIC ACTIVITIES: CPT

## 2020-12-26 PROCEDURE — 6370000000 HC RX 637 (ALT 250 FOR IP): Performed by: NURSE PRACTITIONER

## 2020-12-26 RX ORDER — OXYCODONE HYDROCHLORIDE 5 MG/1
5-10 TABLET ORAL EVERY 6 HOURS PRN
Qty: 56 TABLET | Refills: 0 | Status: SHIPPED | OUTPATIENT
Start: 2020-12-26 | End: 2021-01-09

## 2020-12-26 RX ORDER — METHOCARBAMOL 500 MG/1
500-1000 TABLET, FILM COATED ORAL EVERY 6 HOURS PRN
Qty: 80 TABLET | Refills: 0 | Status: SHIPPED | OUTPATIENT
Start: 2020-12-26 | End: 2021-01-05

## 2020-12-26 RX ORDER — SENNA AND DOCUSATE SODIUM 50; 8.6 MG/1; MG/1
2 TABLET, FILM COATED ORAL 2 TIMES DAILY PRN
Qty: 60 TABLET | Refills: 2 | Status: SHIPPED | OUTPATIENT
Start: 2020-12-26 | End: 2021-03-17

## 2020-12-26 RX ADMIN — FENOFIBRATE 160 MG: 160 TABLET ORAL at 08:17

## 2020-12-26 RX ADMIN — METHOCARBAMOL 1000 MG: 500 TABLET ORAL at 03:09

## 2020-12-26 RX ADMIN — AMLODIPINE BESYLATE 5 MG: 5 TABLET ORAL at 08:17

## 2020-12-26 RX ADMIN — ATORVASTATIN CALCIUM 20 MG: 20 TABLET, FILM COATED ORAL at 08:17

## 2020-12-26 RX ADMIN — LISINOPRIL 20 MG: 20 TABLET ORAL at 08:17

## 2020-12-26 RX ADMIN — ENOXAPARIN SODIUM 40 MG: 40 INJECTION SUBCUTANEOUS at 08:16

## 2020-12-26 RX ADMIN — ACETAMINOPHEN 1000 MG: 500 TABLET, COATED ORAL at 07:21

## 2020-12-26 RX ADMIN — OXYCODONE 5 MG: 5 TABLET ORAL at 10:13

## 2020-12-26 RX ADMIN — DOXAZOSIN 8 MG: 4 TABLET ORAL at 08:16

## 2020-12-26 RX ADMIN — Medication 10 ML: at 08:18

## 2020-12-26 RX ADMIN — OXYCODONE 5 MG: 5 TABLET ORAL at 03:10

## 2020-12-26 RX ADMIN — METHOCARBAMOL 1000 MG: 500 TABLET ORAL at 10:13

## 2020-12-26 ASSESSMENT — PAIN DESCRIPTION - ORIENTATION
ORIENTATION: MID
ORIENTATION: MID;POSTERIOR
ORIENTATION: MID

## 2020-12-26 ASSESSMENT — PAIN SCALES - GENERAL
PAINLEVEL_OUTOF10: 5
PAINLEVEL_OUTOF10: 4
PAINLEVEL_OUTOF10: 5
PAINLEVEL_OUTOF10: 3

## 2020-12-26 ASSESSMENT — PAIN DESCRIPTION - PROGRESSION
CLINICAL_PROGRESSION: NOT CHANGED
CLINICAL_PROGRESSION: NOT CHANGED
CLINICAL_PROGRESSION: GRADUALLY WORSENING

## 2020-12-26 ASSESSMENT — PAIN DESCRIPTION - ONSET
ONSET: ON-GOING

## 2020-12-26 ASSESSMENT — PAIN DESCRIPTION - LOCATION
LOCATION: BACK

## 2020-12-26 ASSESSMENT — PAIN - FUNCTIONAL ASSESSMENT
PAIN_FUNCTIONAL_ASSESSMENT: PREVENTS OR INTERFERES SOME ACTIVE ACTIVITIES AND ADLS

## 2020-12-26 ASSESSMENT — PAIN DESCRIPTION - FREQUENCY
FREQUENCY: CONTINUOUS

## 2020-12-26 ASSESSMENT — PAIN DESCRIPTION - PAIN TYPE
TYPE: ACUTE PAIN
TYPE: ACUTE PAIN
TYPE: SURGICAL PAIN

## 2020-12-26 ASSESSMENT — PAIN DESCRIPTION - DESCRIPTORS
DESCRIPTORS: ACHING;CONSTANT

## 2020-12-26 NOTE — DISCHARGE SUMMARY
release tablet  Take 1-2 tablets by mouth every 6 hours as needed for Pain for up to 14 days. sennosides-docusate sodium (SENOKOT-S) 8.6-50 MG tablet  Take 2 tablets by mouth 2 times daily as needed for Constipation             vitamin D (CHOLECALCIFEROL) 1000 UNIT TABS tablet  Take 1,000 Units by mouth daily                 DIET: DIET GENERAL;    ACTIVITY: No heavy lifting.  ______________________________________________________________________  COMPLEXITY OF FOLLOW UP:   [x] Moderate Complexity: follow up within 7-14 calendar days (96286)   [] Severe Complexity: follow up within 7 calendar days (00436)    FOLLOW UP TESTING, PENDING RESULTS OR REFERRALS AT TRANSITIONAL CARE VISIT:   []  Yes    [x]  No    PENDING STUDIES:   NA    DISPOSITION: Home      Follow up with Y[facility] Neg[address]te [id number]  [address] [facility] [address]  [phone]    Go in 2 weeks        INSTRUCTIONS TO MA/SW: Please call patient on day after discharge (must document patient  contacted within 2 business days of discharge). FOLLOW UP QUESTIONS FOR MA/SW:  1. Did you get medications filled and taking them as instructed from discharge? 2. Are you following your discharge instructions from your hospital stay? 3. Please confirm patient is scheduled for a follow up appointment within the above time frame.     DISCHARGE TIME: > 30 minutes    SIGNED:  Wilfred Longoria MD   12/26/2020, 9:03 AM

## 2020-12-26 NOTE — CARE COORDINATION
Case Management Assessment            Discharge Note                    Date / Time of Note: 12/26/2020 1:52 PM                  Discharge Note Completed by: Juliethbacilio Mckeon    Patient Name: Melly Pandey   YOB: 1945  Diagnosis: Spinal stenosis of lumbar region, unspecified whether neurogenic claudication present [M48.061]  Sacroiliitis [facility]) [M46.1]  Spinal stenosis, lumbar region with neurogenic claudication [M48.062]   Date / Time: 12/21/2020  9:23 AM    Current PCP: A[facility]ia [facility] patient: No    Hospitalization in the last 30 days: No    Advance Directives:  Code Status: Full Code  [place] DNR form completed and on chart: No    Financial:  Payor: MEDICARE / Plan: MEDICARE PART A AND B / Product Type: *No Product type* /      Pharmacy:    [id number] [facility], [address] [place] [address]  [address] [facility] [address]  Phone: [phone] Fax: [address] [facility] 112  [address] [facility]  [address] [address]  Phone: [phone] Fax: [address] [facility], [facility] 157 [phone] - F [phone]  [facility] [address]  Phone: [phone] Fax: [phone]      Assistance purchasing medications?: Potential Assistance Purchasing Medications: No  Assistance provided by Case Management: None at this time    Does patient want to participate in local refill/ meds to beds program?: Not Assessed    Meds To Beds General Rules:  1. Can ONLY be done Monday- Friday between 8:30am-5pm  2. Prescription(s) must be in pharmacy by 3pm to be filled same day  3. Copy of patient's insurance/ prescription drug card and patient face sheet must be sent along with the prescription(s)  4. Cost of Rx cannot be added to hospital bill.  If financial assistance is needed, please contact unit case manager or ;  or  CANNOT provide pharmacy voucher for patients co-pays  5. Patients can then  the prescription on their way out of the hospital at discharge, or pharmacy can deliver to the bedside if staff is available. (payment due at time of pick-up or delivery - cash, check, or card accepted)     Able to afford home medications/ co-pay costs: Yes    ADLS:  Current PT AM-PAC Score: 18 /24  Current OT AM-PAC Score: 16 /24      DISCHARGE Disposition: Home- No Services Needed    LOC at discharge: Not Applicable  ETHAN Completed: Yes    Notification completed in HENS/PAS?:  Not Applicable    IMM Completed:   Yes, Case management has presented and reviewed IMM letter #2 to the patient and/or family/ POA. Patient and/or family/POA verbalized understanding of their medicare rights and appeal process if needed. Patient and/or family/POA has signed, initialed and placed today's date (12/26/2020) and time ([phone]) on IMM letter #2 on the the appropriate lines. Patient and/or family/POA, copy of letter offered and they are aware that this original copy of IMM letter #2 is available prior to discharge from the paper chart on the unit. Electronic documentation has been entered into epic for IMM letter #2 and original paper copy has been added to the paper chart at the nurses station. Transportation:  Transportation PLAN for discharge: family   Mode of Transport: Private Car   [id number]}      Referrals made at [facility] for outpatient continued care:  Not Applicable    Additional CM Notes: Referred to patient for d/c home. Spoke to patient. Patient denies any needs. Declines home care. States he has DME at home. No other needs.      The Plan for Transition of Care is related to the following treatment goals of Spinal stenosis of lumbar region, unspecified whether neurogenic claudication present [M48.061]  Sacroiliitis ([facility] [address] [M46.1]  Spinal stenosis, lumbar region with neurogenic claudication [M48.062]    The Patient and/or patient representative Gretel Prieto and his family were provided with a choice of provider and agrees with the discharge plan Not Indicated    Freedom of choice list was provided with basic dialogue that supports the patient's individualized plan of care/goals and shares the quality data associated with the providers. Not Indicated    Care Transitions patient:  Yes    QUEENIE Acosta, CORNEL-S  [facility] [employer]  Case Management Department  Ph: [phone]

## 2020-12-26 NOTE — PROGRESS NOTES
Patient a/o x4. Vitas stable. Pain treated with oral medication. Incision c/d/i, SHAKEEL, sutures in place. Blisters around incision site. hemovac in place with scant output. Incision cleaned with CHG swab. Resting well overnight. Will continue to monitor.

## 2020-12-26 NOTE — PROGRESS NOTES
surgeries)  Feels ready to go home. Reports having necessary DME. Declining home PT. \"I've been through all of this before. \"    Pain: 3-4/10 low back. RN aware and has been addressing. Objective:    Transfers  Sit to stand: SBA from chair (twice); SBA from bed. Slow and effortful. Stand to sit: SBA into chair; SBA onto bed (twice). Ambulation  Assistance Level: SBA  Assistive device: Wheeled walker  Distance: 150 ft x 2. Lengthy seated rest breaks between walks. Quality of gait: Step-through pattern; moderate reliance on walker for support; decreased pace; decreased heel-toe pattern; effortful; steady with walker    Bed mobility  Supine to sit: SBA, HOB flat with use of rail. Log roll. Sit to Supine: SBA, HOB flat with use of rail. Log roll. 2 trials. Other: Pt reports having a rail for bed at home. Balance  Sat EOB with Supervision  Static stance with walker SBA  Ambulation with wheeled walker SBA    Other  Pt doffed and donned TLSO brace independently with setup only, while sitting EOB. Vitals  Oxygen saturation on RA prior to session = 92%  Oxygen saturation after walking in halls = 89%, but quickly improved to 91%. Pt declined feeling SOB. RN updated. Patient Education  Calling for assist with needs. Expressed understanding. Safety Devices  Pt left with needs in reach. In bed with bed alarm on. RN updated.      AM-PAC score  AM-PAC Inpatient Mobility Raw Score : 18  AM-PAC Inpatient T-Scale Score : 43.63  Mobility Inpatient CMS 0-100% Score: 46.58  Mobility Inpatient CMS G-Code Modifier : CK    Goals: (as determined and assessed by primary PT)  Time Frame for Short term goals: By discharge  Short term goal 1: Pt will perform bed mobility with supervision   Short term goal 2: Pt will transfer to RW with supervision   Short term goal 3: Pt will ambulate with RW and supervision x 150'      Plan:  Times per week: 5-7;    Current Treatment Recommendations: Strengthening, Transfer Training, Endurance Training, Neuromuscular Re-education, Patient/Caregiver Education & Training, Balance Training, Gait Training, Functional Mobility Training, Safety Education & Training, Home Exercise Program, Equipment Evaluation, Education, & procurement    Therapy Time    Individual  Concurrent  Group  Co-treatment    Time In  1005            Time Out  1048            Minutes  43              Timed Code Treatment Minutes: 43  Total Treatment Minutes: 43    Anticipate D/C home with wife later today. Will continue per plan of care if not D/Eddie. If patient is discharged prior to next treatment, this note will serve as the discharge summary.     Augusto [place] #[id number]

## 2020-12-26 NOTE — PROGRESS NOTES
Hospitalist Progress Note      PCP: Esteban Wells, APRN - CNP    Date of Admission: 12/21/2020    Chief Complaint: Hypoxia and Dizziness after surgery. Hospital Course: 70 y.o. male with PMH of CAD , HLD, HTN, hx of DVT who presented to [facility] with Elective back surgery. He is POD 3 for L1-2 TRANSFORAMINAL LUMBAR INTERBODY FUSION, T10 PELVIS SCREW FIXATION / FUSION, BEDROCK SACROIILIAC JOINT FUSION (N/A). Patient had some hypoxia, and was feeling dizzy also had some urinary retention.         Subjective: feels better today oxygen requirement dropped      Medications:  Reviewed    Infusion Medications   Scheduled Medications    methocarbamol  1,000 mg Oral Q6H    acetaminophen  1,000 mg Oral Q6H    sennosides-docusate sodium  2 tablet Oral BID    amLODIPine  5 mg Oral Daily    atorvastatin  20 mg Oral Daily    doxazosin  8 mg Oral Daily    fenofibrate  160 mg Oral Daily    lisinopril  20 mg Oral Daily    sodium chloride flush  10 mL Intravenous 2 times per day    enoxaparin  40 mg Subcutaneous Daily     PRN Meds: oxyCODONE **OR** oxyCODONE, sodium chloride flush, promethazine **OR** ondansetron, naloxone, albuterol      Intake/Output Summary (Last 24 hours) at 12/26/2020 0853  Last data filed at 12/26/2020 2155  Gross per 24 hour   Intake 360 ml   Output 750 ml   Net -390 ml       Physical Exam Performed:    BP (!) 143/84   Pulse 81   Temp 98.3 °F (36.8 °C) (Oral)   Resp 16   Ht 5' 10\" (1.778 m)   Wt 260 lb (117.9 kg)   SpO2 92%   BMI 37.31 kg/m²     General appearance: No apparent distress, appears stated age and cooperative. HEENT: Pupils equal, round, and reactive to light. Conjunctivae/corneas clear. Neck: Supple, with full range of motion. No jugular venous distention. Trachea midline. Respiratory:  Normal respiratory effort. Clear to auscultation, bilaterally without Rales/Wheezes/Rhonchi.   Cardiovascular: Regular rate and rhythm with normal S1/S2 without murmurs, rubs or gallops. Abdomen: Soft, non-tender, non-distended with normal bowel sounds. Musculoskeletal: No clubbing, cyanosis or edema bilaterally. Full range of motion without deformity. Skin: Skin color, texture, turgor normal.  No rashes or lesions. Neurologic:  Neurovascularly intact without any focal sensory/motor deficits. Cranial nerves: II-XII intact, grossly non-focal.  Psychiatric: Alert and oriented, thought content appropriate, normal insight  Capillary Refill: Brisk,< 3 seconds   Peripheral Pulses: +2 palpable, equal bilaterally       Labs:   No results for input(s): WBC, HGB, HCT, PLT in the last 72 hours. No results for input(s): NA, K, CL, CO2, BUN, CREATININE, CALCIUM, PHOS in the last 72 hours. Invalid input(s): MAGNES  No results for input(s): AST, ALT, BILIDIR, BILITOT, ALKPHOS in the last 72 hours. No results for input(s): INR in the last 72 hours. No results for input(s): Cari Pace in the last 72 hours. Urinalysis:      Lab Results   Component Value Date    NITRU POSITIVE 08/17/2018    WBCUA 10-20 08/17/2018    BACTERIA 2+ 08/17/2018    RBCUA 3-5 08/17/2018    BLOODU moderate 05/04/2020    BLOODU TRACE-INTACT 08/17/2018    SPECGRAV 1.020 05/04/2020    SPECGRAV 1.010 08/17/2018    GLUCOSEU neg 05/04/2020    GLUCOSEU Negative 08/17/2018       Radiology:  XR CHEST PORTABLE   Final Result      1. Mild bilateral basilar airspace disease, not significantly changed. XR CHEST PORTABLE   Final Result   Impression:       1. Slight blunting of the left costophrenic angle, nonspecific. Consider obtaining PA and lateral radiograph to assess for pleural effusion. 2. Streaky opacity in the right lung base, suspected to represent subsegmental atelectasis. XR THORACOLUMBAR SPINE (MIN 2 VIEWS)   Final Result   Impression:    Status post [id number] fusion. CT GUIDED STEREOTACTIC LOCALIZATION   Final Result      1.  Intraoperative localizing images for T10-pelvis screw fixation. CT GUIDED STEREOTACTIC LOCALIZATION   Final Result      1. Intraoperative localizing images for T10-pelvis screw fixation.               Assessment/Plan:    Active Hospital Problems    Diagnosis    Spinal stenosis, lumbar region with neurogenic claudication [M48.062]     Low back pain s/p L1-2 lumbar fusion, T10 pelvis screw fixation and fusion, bedrock scraoiliac joint fusion  Post op pain  - pain management     Post op Hypoxia likely 2/2 atelectasis  - Incentive spirometry  - encourage PT/OT  - CXR no acute changes but showed right sided atelectasis     Dizziness 2/2 Medication interaction  - patient takes Cardura at home, was given flomax  - will stop flomax  - those 2 medication has similar mechanism of action    Urinary retention  - seem to have resolved now     HTN  - continue norvasc, cardura, lisinipril     HLD  - statin, fenofibrate     CAD  - holding ASA, statin     Hx of DVT  - holding elquis at least for 10 days  - discussed with neurosurgery  - Lovenox for now     DVT Prophylaxis: lovenox  Diet: DIET GENERAL;  Code Status: Full Code     PT/OT Eval Status: on going     Dispo - wards     Pilar Santiago,

## 2020-12-26 NOTE — PLAN OF CARE
Problem: Pain:  Goal: Control of acute pain  Description: Control of acute pain  12/26/2020 1150 by Jessica Davalos RN  Outcome: Ongoing  Patient complains of pain to lower back. Patient medicated per [facility] with prn PO pain medication. Patient resting comfortably at reassessment. Will continue to assess and monitor. Problem: Falls - Risk of:  Goal: Will remain free from falls  Description: Will remain free from falls  12/26/2020 1150 by Jessica Davalos RN  Outcome: Ongoing  All fall precautions in place. Chair wheels locked with alarm on. Over-bed table and personal belongings within reach. Patient instructed to use call light for assistance. Non-skids socks on. Will continue to monitor.

## 2020-12-26 NOTE — PLAN OF CARE
Problem: Pain:  Goal: Control of acute pain  Outcome: Ongoing   Pain treated with oral medication and repositioning. Will continue to monitor. Problem: Falls - Risk of:  Goal: Will remain free from falls  12/25/2020 2306 by Cecilia Mcelroy RN  Outcome: Ongoing   Calls out appropriately. Bed locked in lowest position. Bed alarm on. Call light/belongings within reach. Will continue to monitor. Problem: Skin Integrity:  Goal: Absence of new skin breakdown  Outcome: Ongoing   Open areas/blisters to back around incision. Mepilex applied. Will continue to monitor.

## 2020-12-28 ENCOUNTER — CARE COORDINATION (OUTPATIENT)
Dept: CASE MANAGEMENT | Age: 75
End: 2020-12-28

## 2020-12-28 PROCEDURE — 1111F DSCHRG MED/CURRENT MED MERGE: CPT | Performed by: NURSE PRACTITIONER

## 2020-12-28 NOTE — CARE COORDINATION
[facility] Transitions Initial Follow Up Call    Call within 2 business days of discharge: Yes    Patient: Searcy Galeazzi Patient : 1945   MRN: [id number]   Reason for Admission: S/P Spinal Fusion  Discharge Date: 20 RARS: Readmission Risk Score: 12      Last Discharge [facility]       Complaint Diagnosis Description Type Department Provider    20  Spinal stenosis, lumbar region with neurogenic claudication Admission (Discharged) [address] Gaetano Aldana MD           Spoke with: [facility]: [facility] [employer]     Non-face-to-face services provided:  Obtained and reviewed discharge summary and/or continuity of care documents  Education of patient/family/caregiver/guardian to support self-management-. Assessment and support for treatment adherence and medication management-.    Care Transitions 24 Hour Call    Schedule Follow Up Appointment with PCP: Declined  Do you have any ongoing symptoms?: Yes  Patient-reported symptoms: Pain  Do you have a copy of your discharge instructions?: Yes  Do you have all of your prescriptions and are they filled?: Yes  Have you been contacted by a [id number] AM AT Pharmacist?: No  Have you scheduled your follow up appointment?: Yes  How are you going to get to your appointment?: Car - family or friend to transport  Were you discharged with any Home Care or Post Acute Services: No  Do you feel like you have everything you need to keep you well at home?: Yes  Care Transitions Interventions  No Identified Needs       Summary  CTN spoke with patient this am for initial 24 hour discharge follow up CTN call. Patient states he is doing well, reports no complaints of any fever, chills, nausea, vomiting, chest pain, SOB or cough. No difficulty with urination, BM's or appetite.   Patient does report having lot's of pain, rated a 6/10 on pain scale, instructed to take PRN Oxycodone as prescribed, around the clock, at least the first couple days, to stay ahead of pain. Patient states he had several BM's prior to leaving hospital, has not had one since returning home, but is passing gas. CTN encouraged patient to increase fluid intake, staying within any fluid restrictions, increase green, leafy fruits and vegetables, add prunes or prune juice as well. CTC and Pt reviewed meds and CTC completed the 1111f. CTC provided education on s/s that require medical attention and when to seek medical attention. CTC advised Pt of use urgent care or physicians 24 hr access line if assistance is needed after hours or on the weekend. Pt denies any needs or concerns and is agreeable with additional calls.     Follow Up  Future Appointments   Date Time Provider Kilo Shirley   3/17/2021  7:00 AM ADIN Cohen - CNP [facility] Brunson RN

## 2021-01-06 ENCOUNTER — CARE COORDINATION (OUTPATIENT)
Dept: CASE MANAGEMENT | Age: 76
End: 2021-01-06

## 2021-01-06 NOTE — CARE COORDINATION
[facility] Transitions Follow Up Call    2021    Patient: Chema Durand  Patient : 1945   MRN: <[id number]>  Reason for Admission:   Discharge Date: 20 RARS: Readmission Risk Score: 12      Attempted to reach patient by phone for follow up; BPCI-A. HIPAA compliant message left on patient's voicemail; CTN contact information provided.         Blaise Calderón RN

## 2021-01-12 ENCOUNTER — CARE COORDINATION (OUTPATIENT)
Dept: CASE MANAGEMENT | Age: 76
End: 2021-01-12

## 2021-01-12 NOTE — CARE COORDINATION
[facility] Transitions Follow Up Call    2021    Patient: Kristi Mcdonald  Patient : 1945   MRN: <[id number]>  Reason for Admission:   Discharge Date: 20 RARS: Readmission Risk Score: 12       Attempted to reach patient by phone for follow up; BPCI-A. HIPAA compliant message left on patient's voicemail; CTN contact information provided.      Digna Leavitt, RN

## 2021-02-03 ENCOUNTER — CARE COORDINATION (OUTPATIENT)
Dept: CASE MANAGEMENT | Age: 76
End: 2021-02-03

## 2021-02-03 NOTE — CARE COORDINATION
[facility] Transitions Follow Up Call    2/3/2021    Patient: Sidney Mccallum  Patient : 1945   MRN: <[id number]>  Reason for Admission:   Discharge Date: 20 RARS: Readmission Risk Score: 12    Attempted to reach patient by phone for follow up; BPCI-A. HIPAA compliant message left on patient's voicemail; CTN contact information provided.         Shanon Espitia RN

## 2021-02-24 ENCOUNTER — CARE COORDINATION (OUTPATIENT)
Dept: CASE MANAGEMENT | Age: 76
End: 2021-02-24

## 2021-02-24 NOTE — CARE COORDINATION
[facility] Transitions Follow Up Call    2021    Patient: Shanna Wells  Patient : 1945   MRN: <[id number]>  Reason for Admission:   Discharge Date: 20 RARS: Readmission Risk Score: 12       Attempted to reach patient by phone regarding follow up; BPCI-A. HIPAA compliant message left on patient's voicemail; CTN contact information provided.      Arianna Bryant RN

## 2021-03-11 ENCOUNTER — TELEPHONE (OUTPATIENT)
Dept: INTERNAL MEDICINE CLINIC | Age: 76
End: 2021-03-11

## 2021-03-11 DIAGNOSIS — Z13.1 SCREENING FOR DIABETES MELLITUS: Primary | ICD-10-CM

## 2021-03-11 DIAGNOSIS — R73.01 IFG (IMPAIRED FASTING GLUCOSE): ICD-10-CM

## 2021-03-11 DIAGNOSIS — E78.5 DYSLIPIDEMIA: ICD-10-CM

## 2021-03-15 ENCOUNTER — HOSPITAL ENCOUNTER (OUTPATIENT)
Age: 76
Discharge: HOME | End: 2021-03-15
Payer: MEDICARE

## 2021-03-15 DIAGNOSIS — E78.5 DYSLIPIDEMIA: ICD-10-CM

## 2021-03-15 DIAGNOSIS — R73.01 IFG (IMPAIRED FASTING GLUCOSE): ICD-10-CM

## 2021-03-15 LAB
A/G RATIO: 1.5 (ref 1.1–2.2)
ALBUMIN SERPL-MCNC: 4 G/DL (ref 3.4–5)
ALP BLD-CCNC: 51 U/L (ref 40–129)
ALT SERPL-CCNC: 18 U/L (ref 10–40)
ANION GAP SERPL CALCULATED.3IONS-SCNC: 10 MMOL/L (ref 3–16)
AST SERPL-CCNC: 15 U/L (ref 15–37)
BILIRUB SERPL-MCNC: 0.3 MG/DL (ref 0–1)
BUN BLDV-MCNC: 19 MG/DL (ref 7–20)
CALCIUM SERPL-MCNC: 8.9 MG/DL (ref 8.3–10.6)
CHLORIDE BLD-SCNC: 107 MMOL/L (ref 99–110)
CHOLESTEROL, TOTAL: 166 MG/DL (ref 0–199)
CO2: 25 MMOL/L (ref 21–32)
CREAT SERPL-MCNC: 1.2 MG/DL (ref 0.8–1.3)
GFR AFRICAN AMERICAN: >60
GFR NON-AFRICAN AMERICAN: 59
GLOBULIN: 2.6 G/DL
GLUCOSE BLD-MCNC: 116 MG/DL (ref 70–99)
HDLC SERPL-MCNC: 29 MG/DL (ref 40–60)
LDL CHOLESTEROL CALCULATED: 111 MG/DL
POTASSIUM SERPL-SCNC: 4.5 MMOL/L (ref 3.5–5.1)
SODIUM BLD-SCNC: 142 MMOL/L (ref 136–145)
TOTAL PROTEIN: 6.6 G/DL (ref 6.4–8.2)
TRIGL SERPL-MCNC: 132 MG/DL (ref 0–150)
VLDLC SERPL CALC-MCNC: 26 MG/DL

## 2021-03-15 PROCEDURE — 80061 LIPID PANEL: CPT

## 2021-03-15 PROCEDURE — 80053 COMPREHEN METABOLIC PANEL: CPT

## 2021-03-15 PROCEDURE — 83036 HEMOGLOBIN GLYCOSYLATED A1C: CPT

## 2021-03-15 PROCEDURE — 36415 COLL VENOUS BLD VENIPUNCTURE: CPT

## 2021-03-16 LAB
ESTIMATED AVERAGE GLUCOSE: 111.2 MG/DL
HBA1C MFR BLD: 5.5 %

## 2021-03-17 ENCOUNTER — HOSPITAL ENCOUNTER (OUTPATIENT)
Dept: GENERAL RADIOLOGY | Age: 76
Discharge: HOME | End: 2021-03-17
Payer: MEDICARE

## 2021-03-17 ENCOUNTER — OFFICE VISIT (OUTPATIENT)
Dept: INTERNAL MEDICINE CLINIC | Age: 76
End: 2021-03-17
Payer: MEDICARE

## 2021-03-17 ENCOUNTER — HOSPITAL ENCOUNTER (OUTPATIENT)
Age: 76
Discharge: HOME | End: 2021-03-17
Payer: MEDICARE

## 2021-03-17 VITALS
WEIGHT: 264 LBS | TEMPERATURE: 97.8 F | OXYGEN SATURATION: 99 % | SYSTOLIC BLOOD PRESSURE: 136 MMHG | DIASTOLIC BLOOD PRESSURE: 78 MMHG | BODY MASS INDEX: 37.88 KG/M2 | HEART RATE: 84 BPM

## 2021-03-17 DIAGNOSIS — R35.1 BENIGN PROSTATIC HYPERPLASIA WITH NOCTURIA: ICD-10-CM

## 2021-03-17 DIAGNOSIS — Z77.29: ICD-10-CM

## 2021-03-17 DIAGNOSIS — C43.9 MALIGNANT MELANOMA, UNSPECIFIED SITE (HCC): ICD-10-CM

## 2021-03-17 DIAGNOSIS — I25.10 CORONARY ARTERY DISEASE INVOLVING NATIVE HEART, ANGINA PRESENCE UNSPECIFIED, UNSPECIFIED VESSEL OR LESION TYPE: ICD-10-CM

## 2021-03-17 DIAGNOSIS — I25.119 ATHEROSCLEROSIS OF NATIVE CORONARY ARTERY OF NATIVE HEART WITH ANGINA PECTORIS (HCC): ICD-10-CM

## 2021-03-17 DIAGNOSIS — Z86.718 HISTORY OF DEEP VENOUS THROMBOSIS: ICD-10-CM

## 2021-03-17 DIAGNOSIS — Z00.00 ROUTINE GENERAL MEDICAL EXAMINATION AT A HEALTH CARE FACILITY: Primary | ICD-10-CM

## 2021-03-17 DIAGNOSIS — I82.5Z1 CHRONIC DEEP VEIN THROMBOSIS (DVT) OF DISTAL VEIN OF RIGHT LOWER EXTREMITY (HCC): ICD-10-CM

## 2021-03-17 DIAGNOSIS — N40.1 BENIGN PROSTATIC HYPERPLASIA WITH NOCTURIA: ICD-10-CM

## 2021-03-17 DIAGNOSIS — R73.01 IFG (IMPAIRED FASTING GLUCOSE): ICD-10-CM

## 2021-03-17 DIAGNOSIS — E78.5 DYSLIPIDEMIA: ICD-10-CM

## 2021-03-17 PROBLEM — E66.01 MORBIDLY OBESE (HCC): Status: RESOLVED | Noted: 2020-08-17 | Resolved: 2021-03-17

## 2021-03-17 PROBLEM — M81.0 AGE-RELATED OSTEOPOROSIS WITHOUT CURRENT PATHOLOGICAL FRACTURE: Status: ACTIVE | Noted: 2020-08-25

## 2021-03-17 PROCEDURE — 71046 X-RAY EXAM CHEST 2 VIEWS: CPT

## 2021-03-17 PROCEDURE — 3017F COLORECTAL CA SCREEN DOC REV: CPT | Performed by: NURSE PRACTITIONER

## 2021-03-17 PROCEDURE — G0439 PPPS, SUBSEQ VISIT: HCPCS | Performed by: NURSE PRACTITIONER

## 2021-03-17 PROCEDURE — 4040F PNEUMOC VAC/ADMIN/RCVD: CPT | Performed by: NURSE PRACTITIONER

## 2021-03-17 PROCEDURE — G8484 FLU IMMUNIZE NO ADMIN: HCPCS | Performed by: NURSE PRACTITIONER

## 2021-03-17 PROCEDURE — 1123F ACP DISCUSS/DSCN MKR DOCD: CPT | Performed by: NURSE PRACTITIONER

## 2021-03-17 RX ORDER — FENOFIBRATE 160 MG/1
TABLET ORAL
Qty: 90 TABLET | Refills: 3 | Status: SHIPPED | OUTPATIENT
Start: 2021-03-17 | End: 2022-02-02

## 2021-03-17 ASSESSMENT — PATIENT HEALTH QUESTIONNAIRE - PHQ9
SUM OF ALL RESPONSES TO PHQ QUESTIONS 1-9: 0
2. FEELING DOWN, DEPRESSED OR HOPELESS: 0
SUM OF ALL RESPONSES TO PHQ QUESTIONS 1-9: 0

## 2021-03-17 NOTE — PROGRESS NOTES
Medicare Annual Wellness Visit  Name: Tess Ormond Date: 3/17/2021   MRN: [id number] Sex: Male   Age: 78 y.o. Ethnicity: Non-/Non    : 1945 Race: Marisa Strickland is here for Medicare AWV and Other ([facility])    Screenings for behavioral, psychosocial and functional/safety risks, and cognitive dysfunction are all negative except as indicated below. These results, as well as other patient data from the [address] form, are documented in Flowsheets linked to this Encounter. Pt has L1-2 fusion 2020. Pt tolerated well. F/u surgeon yesterday. Recommendation PT for R hip. Ambulating with cane and wearing brace for 6 more weeks. Dermatology: q6-12 months. CAD. F/u Dr Dedra Aase, no changes, visits annually. Allergies   Allergen Reactions    Corticosteroids      Other reaction(s): unable to urinate    Cortisone Nausea Only    Gabapentin Other (See Comments)     Shaky    Other      STEROIDS    Prednisolone     Sulfa Antibiotics Rash and Other (See Comments)     Unknown, Rash possibly. Prior to Visit Medications    Medication Sig Taking? Authorizing Provider   apixaban (ELIQUIS) 5 MG TABS tablet TAKE 1 TABLET BY MOUTH TWICE A DAY Yes John Holt APRN - CNP   fenofibrate (TRIGLIDE) 160 MG tablet TAKE 1 TABLET BY MOUTH EVERY DAY Yes ADIN Fernandez - CNP   Multiple Vitamins-Minerals (MULTIVITAMIN ADULT PO) Take by mouth Yes Historical Provider, MD   lisinopril (PRINIVIL;ZESTRIL) 20 MG tablet Take 1 tablet by mouth daily Yes Susan Moreno MD   amLODIPine (NORVASC) 5 MG tablet Take 1 tablet by mouth daily Yes Susan Moreno MD   doxazosin (CARDURA) 8 MG tablet TAKE 1 TABLET BY MOUTH EVERY DAY AT NIGHT Yes ADIN Fernandez - CNP   atorvastatin (LIPITOR) 20 MG tablet TAKE 1 TABLET BY MOUTH EVERY DAY Yes ADIN Fernandez CNP   metroNIDAZOLE (METROGEL) 0.75 % gel Apply topically 2 times daily.  Yes ADIN Sharma - KEENA   vitamin D providing care):   Patient Care Team:  ADIN Smith CNP as PCP - General (Family Nurse Practitioner)  ADIN Smith CNP as PCP - [facility]  Nelly Souza MD as Consulting Physician (Orthopedic Surgery)  ADIN Smith CNP as Nurse Practitioner (Family Nurse Practitioner)  Jordy Mack RN as Care Transitions Nurse    Wt Readings from Last 3 Encounters:   03/17/21 264 lb (119.7 kg)   12/21/20 260 lb (117.9 kg)   12/11/20 267 lb (121.1 kg)     Vitals:    03/17/21 0703   BP: 136/78   Site: Right Upper Arm   Position: Sitting   Cuff Size: Large Adult   Pulse: 84   Temp: 97.8 °F (36.6 °C)   TempSrc: Temporal   SpO2: 99%   Weight: 264 lb (119.7 kg)     Body mass index is 37.88 kg/m². Based upon direct observation of the patient, evaluation of cognition reveals recent and remote memory intact.     General Appearance: alert and oriented to person, place and time, well developed and well- nourished, in no acute distress  Skin: warm and dry, no rash or erythema  Head: normocephalic and atraumatic  Eyes: pupils equal, round, and reactive to light, extraocular eye movements intact, conjunctivae normal  ENT: tympanic membrane, external ear and ear canal normal bilaterally, nose without deformity, nasal mucosa and turbinates normal without polyps  Neck: supple and non-tender without mass, no thyromegaly or thyroid nodules, no cervical lymphadenopathy  Pulmonary/Chest: clear to auscultation bilaterally- no wheezes, rales or rhonchi, normal air movement, no respiratory distress  Cardiovascular: normal rate, regular rhythm, normal S1 and S2, no murmurs, rubs, clicks, or gallops, distal pulses intact, no carotid bruits  Abdomen: soft, non-tender, non-distended, normal bowel sounds, no masses or organomegaly  Extremities: no cyanosis, clubbing or edema  Musculoskeletal: normal range of motion, no joint swelling, deformity or tenderness  Neurologic: reflexes normal and symmetric, no cranial nerve deficit  Wearing lumbar thoracic brace in office visit. Ambulates with cane. Patient's complete Health Risk Assessment and screening values have been reviewed and are found in Flowsheets. The following problems were reviewed today and where indicated follow up appointments were made and/or referrals ordered. Positive Risk Factor Screenings with Interventions:            General Health and ACP:  General  In general, how would you say your health is?: Very Good  In the past 7 days, have you experienced any of the following?  New or Increased Pain, New or Increased Fatigue, Loneliness, Social Isolation, Stress or Anger?: None of These  Do you get the social and emotional support that you need?: Yes  Do you have a Living Will?: Yes  Advance Directives     Power of  Living Will ACP-Advance Directive ACP-Power of     Not on File Not on File Not on File Not on File      General Health Risk Interventions:  · No Living Will: ACP documents already completed- patient asked to provide copy to the office    Health Habits/Nutrition:  Health Habits/Nutrition  Do you exercise for at least 20 minutes 2-3 times per week?: Yes  Have you lost any weight without trying in the past 3 months?: No  Do you eat only one meal per day?: (!) Yes(1-2 times per month)  Have you seen the dentist within the past year?: Yes     Health Habits/Nutrition Interventions:  · Nutritional issues:  Reviewed with pt, healthy meal planning (Veggies and fruits)       Personalized Preventive Plan   Current Health Maintenance Status  Immunization History   Administered Date(s) Administered    Influenza Vaccine, unspecified formulation 10/01/2018    Influenza Virus Vaccine 10/01/2018    Influenza, High Dose (Fluzone 65 yrs and older) 10/01/2018    Influenza, Quadv, IM, PF (6 mo and older Fluzone, Flulaval, Fluarix, and 3 yrs and older Afluria) 02/12/2020    Influenza, Quadv, adjuvanted, 65 yrs +, IM, PF (Fluad) 12/11/2020    thrombosis  Maintain Eliquis daily.  Stable    Other orders  -     apixaban (ELIQUIS) 5 MG TABS tablet; TAKE 1 TABLET BY MOUTH TWICE A DAY    Malignant melanoma  Managed by Dermatology every 6-12 months

## 2021-03-17 NOTE — PATIENT INSTRUCTIONS
Personalized Preventive Plan for Shree Croft - 3/17/2021  Medicare offers a range of preventive health benefits. Some of the tests and screenings are paid in full while other may be subject to a deductible, co-insurance, and/or copay. Some of these benefits include a comprehensive review of your medical history including lifestyle, illnesses that may run in your family, and various assessments and screenings as appropriate. After reviewing your medical record and screening and assessments performed today your provider may have ordered immunizations, labs, imaging, and/or referrals for you. A list of these orders (if applicable) as well as your Preventive Care list are included within your After Visit Summary for your review. Other Preventive Recommendations:    · A preventive eye exam performed by an eye specialist is recommended every 1-2 years to screen for glaucoma; cataracts, macular degeneration, and other eye disorders. · A preventive dental visit is recommended every 6 months. · Try to get at least 150 minutes of exercise per week or 10,000 steps per day on a pedometer . · Order or download the FREE \"Exercise & Physical Activity: Your Everyday Guide\" from [employer]. Call 0-778.594.4972 or search [employer] online. · You need 6586-9564 mg of calcium and 4992-1781 IU of vitamin D per day. It is possible to meet your calcium requirement with diet alone, but a vitamin D supplement is usually necessary to meet this goal.  · When exposed to the sun, use a sunscreen that protects against both UVA and UVB radiation with an SPF of 30 or greater. Reapply every 2 to 3 hours or after sweating, drying off with a towel, or swimming. · Always wear a seat belt when traveling in a car. Always wear a helmet when riding a bicycle or motorcycle. Shingles vaccine in Fall 2021    Blood work prior to next appointment.      Ordered Chest Xray

## 2021-03-19 ENCOUNTER — TELEPHONE (OUTPATIENT)
Dept: INTERNAL MEDICINE CLINIC | Age: 76
End: 2021-03-19

## 2021-03-19 ENCOUNTER — CARE COORDINATION (OUTPATIENT)
Dept: CASE MANAGEMENT | Age: 76
End: 2021-03-19

## 2021-03-19 NOTE — TELEPHONE ENCOUNTER
Patient called [facility] to request refill of medication. Was told by [facility] that our office cancelled his prescription of this medication. apixaban (ELIQUIS) 5 MG TABS tablet [[id number]]    [facility]/pharmacy [place] [address], [place] - [address] -  [phone]     From what I see this was just sent two days ago with 2 refills. Please call the pharmacy and get this corrected, then call patient to let him know what is going on with this script.

## 2021-03-19 NOTE — CARE COORDINATION
[facility] Transitions Follow Up Call    3/19/2021    Patient: Dawit Alvarenga  Patient : 1945   MRN: [id number]  Reason for Admission: Spinal fusion  Discharge Date: 20 RARS: Readmission Risk Score: 12         Attempted to contact patient for BPCI-A final call. \"Unavailable\" per recording. CTN sign off.     Follow Up  Future Appointments   Date Time Provider Kilo Shirley   2021  8:00 AM Thompson Schilder, APRN - CNP [facility]   3/18/2022  8:00 AM Thompson Schilder, APRN - CNP [facility] Phillips RN

## 2021-03-19 NOTE — TELEPHONE ENCOUNTER
Patient needs a refill on:  Is Out needs today     apixaban (ELIQUIS) 5 MG TABS tablet      [facility]pharmacy [place] [address], [place] - [facility]

## 2021-05-28 RX ORDER — ATORVASTATIN CALCIUM 20 MG/1
TABLET, FILM COATED ORAL
Qty: 90 TABLET | Refills: 3 | Status: SHIPPED | OUTPATIENT
Start: 2021-05-28 | End: 2022-05-16

## 2021-05-28 NOTE — TELEPHONE ENCOUNTER
Refill request for atorvastatin  medication.      Name of Pharmacy- [facility]       Last visit - 3-     Pending visit - 9-    Last refill -8-      Medication Contract signed -   Last Aquiles rosen-         Additional Comments

## 2021-06-16 NOTE — PROGRESS NOTES
Pt arrived for image guided myelogram lumbar and thoracic. Procedure explained including the risk and benefits of the procedure. All questions answered. Pt verbalizes understanding of the procedure and states no more questions. Consent signed. Vital signs stable see flow sheets. Labs, allergies, medications, and code status reviewed. No Contraindications noted.
Moderna dose 1 and 2

## 2021-07-16 ENCOUNTER — OFFICE VISIT (OUTPATIENT)
Dept: INTERNAL MEDICINE CLINIC | Age: 76
End: 2021-07-16
Payer: MEDICARE

## 2021-07-16 ENCOUNTER — HOSPITAL ENCOUNTER (OUTPATIENT)
Age: 76
Discharge: HOME | End: 2021-07-16
Payer: MEDICARE

## 2021-07-16 VITALS
DIASTOLIC BLOOD PRESSURE: 70 MMHG | SYSTOLIC BLOOD PRESSURE: 138 MMHG | HEART RATE: 91 BPM | TEMPERATURE: 97.6 F | WEIGHT: 280 LBS | BODY MASS INDEX: 40.18 KG/M2 | OXYGEN SATURATION: 99 %

## 2021-07-16 DIAGNOSIS — M79.10 MYALGIA: ICD-10-CM

## 2021-07-16 DIAGNOSIS — M79.10 MYALGIA: Primary | ICD-10-CM

## 2021-07-16 DIAGNOSIS — R53.83 FATIGUE, UNSPECIFIED TYPE: ICD-10-CM

## 2021-07-16 DIAGNOSIS — N40.1 BENIGN PROSTATIC HYPERPLASIA WITH NOCTURIA: ICD-10-CM

## 2021-07-16 DIAGNOSIS — R03.1 BLOOD PRESSURE DECREASED: ICD-10-CM

## 2021-07-16 DIAGNOSIS — R35.1 BENIGN PROSTATIC HYPERPLASIA WITH NOCTURIA: ICD-10-CM

## 2021-07-16 DIAGNOSIS — R11.0 NAUSEA: ICD-10-CM

## 2021-07-16 LAB
A/G RATIO: 1.7 (ref 1.1–2.2)
ALBUMIN SERPL-MCNC: 4.3 G/DL (ref 3.4–5)
ALP BLD-CCNC: 49 U/L (ref 40–129)
ALT SERPL-CCNC: 25 U/L (ref 10–40)
ANION GAP SERPL CALCULATED.3IONS-SCNC: 10 MMOL/L (ref 3–16)
AST SERPL-CCNC: 18 U/L (ref 15–37)
BASOPHILS ABSOLUTE: 0 K/UL (ref 0–0.2)
BASOPHILS RELATIVE PERCENT: 0.3 %
BILIRUB SERPL-MCNC: 0.6 MG/DL (ref 0–1)
BUN BLDV-MCNC: 30 MG/DL (ref 7–20)
CALCIUM SERPL-MCNC: 9.3 MG/DL (ref 8.3–10.6)
CHLORIDE BLD-SCNC: 105 MMOL/L (ref 99–110)
CO2: 26 MMOL/L (ref 21–32)
CREAT SERPL-MCNC: 1.4 MG/DL (ref 0.8–1.3)
EOSINOPHILS ABSOLUTE: 0.1 K/UL (ref 0–0.6)
EOSINOPHILS RELATIVE PERCENT: 0.9 %
GFR AFRICAN AMERICAN: 60
GFR NON-AFRICAN AMERICAN: 49
GLOBULIN: 2.5 G/DL
GLUCOSE BLD-MCNC: 108 MG/DL (ref 70–99)
HCT VFR BLD CALC: 45.4 % (ref 40.5–52.5)
HEMOGLOBIN: 15.1 G/DL (ref 13.5–17.5)
LYMPHOCYTES ABSOLUTE: 1.8 K/UL (ref 1–5.1)
LYMPHOCYTES RELATIVE PERCENT: 28 %
MAGNESIUM: 2.2 MG/DL (ref 1.8–2.4)
MCH RBC QN AUTO: 31.6 PG (ref 26–34)
MCHC RBC AUTO-ENTMCNC: 33.2 G/DL (ref 31–36)
MCV RBC AUTO: 95 FL (ref 80–100)
MONOCYTES ABSOLUTE: 0.6 K/UL (ref 0–1.3)
MONOCYTES RELATIVE PERCENT: 10.2 %
NEUTROPHILS ABSOLUTE: 3.8 K/UL (ref 1.7–7.7)
NEUTROPHILS RELATIVE PERCENT: 60.6 %
PDW BLD-RTO: 14.3 % (ref 12.4–15.4)
PLATELET # BLD: 204 K/UL (ref 135–450)
PMV BLD AUTO: 7.7 FL (ref 5–10.5)
POTASSIUM SERPL-SCNC: 4.8 MMOL/L (ref 3.5–5.1)
PROSTATE SPECIFIC ANTIGEN: 1.3 NG/ML (ref 0–4)
RBC # BLD: 4.78 M/UL (ref 4.2–5.9)
SODIUM BLD-SCNC: 141 MMOL/L (ref 136–145)
TOTAL PROTEIN: 6.8 G/DL (ref 6.4–8.2)
WBC # BLD: 6.2 K/UL (ref 4–11)

## 2021-07-16 PROCEDURE — 3017F COLORECTAL CA SCREEN DOC REV: CPT | Performed by: NURSE PRACTITIONER

## 2021-07-16 PROCEDURE — 99214 OFFICE O/P EST MOD 30 MIN: CPT | Performed by: NURSE PRACTITIONER

## 2021-07-16 PROCEDURE — 80053 COMPREHEN METABOLIC PANEL: CPT

## 2021-07-16 PROCEDURE — 85025 COMPLETE CBC W/AUTO DIFF WBC: CPT

## 2021-07-16 PROCEDURE — 84153 ASSAY OF PSA TOTAL: CPT

## 2021-07-16 PROCEDURE — G8427 DOCREV CUR MEDS BY ELIG CLIN: HCPCS | Performed by: NURSE PRACTITIONER

## 2021-07-16 PROCEDURE — G8417 CALC BMI ABV UP PARAM F/U: HCPCS | Performed by: NURSE PRACTITIONER

## 2021-07-16 PROCEDURE — 4040F PNEUMOC VAC/ADMIN/RCVD: CPT | Performed by: NURSE PRACTITIONER

## 2021-07-16 PROCEDURE — 1123F ACP DISCUSS/DSCN MKR DOCD: CPT | Performed by: NURSE PRACTITIONER

## 2021-07-16 PROCEDURE — 83735 ASSAY OF MAGNESIUM: CPT

## 2021-07-16 PROCEDURE — 1036F TOBACCO NON-USER: CPT | Performed by: NURSE PRACTITIONER

## 2021-07-16 PROCEDURE — 36415 COLL VENOUS BLD VENIPUNCTURE: CPT

## 2021-07-16 RX ORDER — METHOCARBAMOL 500 MG/1
TABLET, FILM COATED ORAL
COMMUNITY
Start: 2021-04-30

## 2021-07-16 NOTE — PATIENT INSTRUCTIONS
Change Lisinopril from evening to morning. Stay off Amlodipine.      Log BP readings in [employer] \"track my health\"

## 2021-07-16 NOTE — PROGRESS NOTES
[facility]  1945        Chief Complaint   Patient presents with    Hypertension     BP has been up and down / DC - amlodipine     Memory Loss    Nausea     off and on     Spasms     bilat legs / worse at night . tx: potassium 99mg       Assessment/Plan:     1. Myalgia  Check labs. Monitor closely. Manage water intake well, 60+ oz /day  - MAGNESIUM; Future  - Comprehensive Metabolic Panel; Future  - CBC Auto Differential; Future    2. Nausea  Check labs. Monitor closely. - MAGNESIUM; Future  - Comprehensive Metabolic Panel; Future  - CBC Auto Differential; Future    3. Fatigue, unspecified type  Check labs. Monitor closely    - MAGNESIUM; Future  - Comprehensive Metabolic Panel; Future  - CBC Auto Differential; Future    4. Blood pressure decreased  Check BP at home and log   Change Lisinopril from evening to morning. Maintain w/o Amlodipine. - MAGNESIUM; Future  - Comprehensive Metabolic Panel; Future  - CBC Auto Differential; Future  - MyChart Blood Pressure Flowsheet        HPI:      In the last 3 weeks he has noticed new symptoms. In the afternoon he would feel increase fatigue, drowsiness. BP checks at that time 120/58, 116/88, 133/70s, 113/66. Noticed mild nausea with waking in the morning and sometimes in afternoon. Leg cramps started 2 weeks ago, B. 5:30AM wake with pain and cramping. Started potassium - helpful. Performing PT for back pain. [facility] - performed injection in R hip for Bursitis. Did not help. /70 (Site: Left Upper Arm, Position: Sitting, Cuff Size: Large Adult)   Pulse 91   Temp 97.6 °F (36.4 °C) (Temporal)   Wt 280 lb (127 kg)   SpO2 99%   BMI 40.18 kg/m²     Prior to Visit Medications    Medication Sig Taking?  Authorizing Provider   Potassium 99 MG TABS Take by mouth daily Yes Historical Provider, MD   atorvastatin (LIPITOR) 20 MG tablet TAKE 1 TABLET BY MOUTH EVERY DAY Yes John Holt, APRN - CNP   apixaban (ELIQUIS) 5 MG TABS tablet TAKE 1 TABLET BY MOUTH TWICE A DAY Yes ADIN Fernandez CNP   fenofibrate (TRIGLIDE) 160 MG tablet TAKE 1 TABLET BY MOUTH EVERY DAY Yes ADIN Fernandez CNP   Multiple Vitamins-Minerals (MULTIVITAMIN ADULT PO) Take by mouth Yes Historical Provider, MD   lisinopril (PRINIVIL;ZESTRIL) 20 MG tablet Take 1 tablet by mouth daily Yes Myles Henderson MD   doxazosin (CARDURA) 8 MG tablet TAKE 1 TABLET BY MOUTH EVERY DAY AT NIGHT Yes ADIN Fernandez CNP   metroNIDAZOLE (METROGEL) 0.75 % gel Apply topically 2 times daily. Yes ADIN Carlos CNP   vitamin D (CHOLECALCIFEROL) 1000 UNIT TABS tablet Take 1,000 Units by mouth daily Yes Historical Provider, MD   Calcium Citrate 200 MG TABS Take 800 mg by mouth daily Yes Historical Provider, MD   ascorbic acid (VITAMIN C) 500 MG tablet Take 500 mg by mouth daily.  Yes Historical Provider, MD   methocarbamol (ROBAXIN) 500 MG tablet TAKE 1 TABLET BY MOUTH EVERY 6 HOURS AS NEEDED FOR MUSCLE PAIN  Historical Provider, MD   amLODIPine (NORVASC) 5 MG tablet Take 1 tablet by mouth daily  Patient not taking: Reported on 2021  Myles Henderson MD     Family History   Problem Relation Age of Onset    Heart Disease Mother 76    Heart Attack Mother     Other Mother         Smoker    Heart Disease Father 55    Heart Attack Father     Cancer Brother 35        colon    Crohn's Disease Brother     Heart Disease Paternal Grandfather 39    Heart Attack Paternal Grandfather      Social History     Socioeconomic History    Marital status:      Spouse name: Not on file    Number of children: Not on file    Years of education: Not on file    Highest education level: Not on file   Occupational History    Not on file   Tobacco Use    Smoking status: Former Smoker     Packs/day: 1.00     Years: 15.00     Pack years: 15.00     Quit date: 1967     Years since quittin.6    Smokeless tobacco: Never Used   Vaping Use    Vaping Use: Never used   Substance and Sexual Activity    Alcohol use: No    Drug use: No    Sexual activity: Yes     Partners: Female   Other Topics Concern    Not on file   Social History Narrative    Not on file     Social Determinants of Health     Financial Resource Strain:     Difficulty of Paying Living Expenses:    Food Insecurity:     Worried About Running Out of Food in the Last Year:     [address] in the Last Year:    Transportation Needs:     Lack of Transportation (Medical):  Lack of Transportation (Non-Medical):    Physical Activity:     Days of Exercise per Week:     Minutes of Exercise per Session:    Stress:     Feeling of Stress :    Social Connections:     Frequency of Communication with Friends and Family:     Frequency of Social Gatherings with Friends and Family:     Attends Alevism Services:     Active Member of Clubs or Organizations:     Attends Club or Organization Meetings:     Marital Status:    Intimate Partner Violence:     Fear of Current or Ex-Partner:     Emotionally Abused:     Physically Abused:     Sexually Abused:        Review of Systems   Constitutional: Positive for fatigue. Negative for appetite change, chills, fever and unexpected weight change. HENT: Negative for congestion, ear discharge, ear pain, facial swelling, hearing loss, sinus pressure, sneezing and sore throat. Respiratory: Negative for cough. Cardiovascular: Negative for chest pain. Gastrointestinal: Positive for nausea. Negative for diarrhea and vomiting. Genitourinary: Negative for difficulty urinating, dysuria, hematuria and urgency. Musculoskeletal: Positive for myalgias (BLE). Negative for arthralgias and gait problem. Neurological: Negative for dizziness, weakness and headaches. Hematological: Negative for adenopathy. Psychiatric/Behavioral: Negative for sleep disturbance and suicidal ideas. Physical Exam  Vitals reviewed. Constitutional:       Appearance: He is obese.    HENT: Head: Normocephalic. Neck:      Vascular: No carotid bruit. Cardiovascular:      Rate and Rhythm: Normal rate and regular rhythm. Pulses: Normal pulses. Heart sounds: Normal heart sounds. Pulmonary:      Effort: Pulmonary effort is normal.      Breath sounds: Normal breath sounds. Abdominal:      General: Abdomen is flat. Bowel sounds are normal.      Palpations: There is no mass. Tenderness: There is no abdominal tenderness. Hernia: No hernia is present. Musculoskeletal:      Right lower leg: No edema. Left lower leg: No edema. Neurological:      General: No focal deficit present. Mental Status: He is alert and oriented to person, place, and time. Psychiatric:         Mood and Affect: Mood normal.         Thought Content: Thought content normal.         Judgment: Judgment normal.             See above plan. Return for Sept appt already scheduled.     ADIN Carlos - CNP

## 2021-07-18 DIAGNOSIS — N28.9 RENAL INSUFFICIENCY: Primary | ICD-10-CM

## 2021-07-18 ASSESSMENT — ENCOUNTER SYMPTOMS
COUGH: 0
VOMITING: 0
FACIAL SWELLING: 0
NAUSEA: 1
SORE THROAT: 0
SINUS PRESSURE: 0
DIARRHEA: 0

## 2021-07-21 ENCOUNTER — HOSPITAL ENCOUNTER (OUTPATIENT)
Age: 76
Discharge: HOME | End: 2021-07-21
Payer: MEDICARE

## 2021-07-21 DIAGNOSIS — N28.9 RENAL INSUFFICIENCY: ICD-10-CM

## 2021-07-21 LAB
A/G RATIO: 1.3 (ref 1.1–2.2)
ALBUMIN SERPL-MCNC: 3.8 G/DL (ref 3.4–5)
ALP BLD-CCNC: 55 U/L (ref 40–129)
ALT SERPL-CCNC: 25 U/L (ref 10–40)
ANION GAP SERPL CALCULATED.3IONS-SCNC: 11 MMOL/L (ref 3–16)
AST SERPL-CCNC: 17 U/L (ref 15–37)
BILIRUB SERPL-MCNC: 0.5 MG/DL (ref 0–1)
BUN BLDV-MCNC: 26 MG/DL (ref 7–20)
CALCIUM SERPL-MCNC: 9 MG/DL (ref 8.3–10.6)
CHLORIDE BLD-SCNC: 102 MMOL/L (ref 99–110)
CO2: 27 MMOL/L (ref 21–32)
CREAT SERPL-MCNC: 1.3 MG/DL (ref 0.8–1.3)
GFR AFRICAN AMERICAN: >60
GFR NON-AFRICAN AMERICAN: 54
GLOBULIN: 3 G/DL
GLUCOSE BLD-MCNC: 105 MG/DL (ref 70–99)
POTASSIUM SERPL-SCNC: 4.3 MMOL/L (ref 3.5–5.1)
SODIUM BLD-SCNC: 140 MMOL/L (ref 136–145)
TOTAL PROTEIN: 6.8 G/DL (ref 6.4–8.2)

## 2021-07-21 PROCEDURE — 80053 COMPREHEN METABOLIC PANEL: CPT

## 2021-07-21 PROCEDURE — 36415 COLL VENOUS BLD VENIPUNCTURE: CPT

## 2021-09-27 ENCOUNTER — OFFICE VISIT (OUTPATIENT)
Dept: INTERNAL MEDICINE CLINIC | Age: 76
End: 2021-09-27
Payer: MEDICARE

## 2021-09-27 VITALS
BODY MASS INDEX: 37.16 KG/M2 | WEIGHT: 259 LBS | DIASTOLIC BLOOD PRESSURE: 74 MMHG | OXYGEN SATURATION: 99 % | HEART RATE: 63 BPM | SYSTOLIC BLOOD PRESSURE: 136 MMHG

## 2021-09-27 DIAGNOSIS — N40.1 BENIGN PROSTATIC HYPERPLASIA WITH NOCTURIA: ICD-10-CM

## 2021-09-27 DIAGNOSIS — N18.31 STAGE 3A CHRONIC KIDNEY DISEASE (HCC): ICD-10-CM

## 2021-09-27 DIAGNOSIS — M48.062 LUMBAR STENOSIS WITH NEUROGENIC CLAUDICATION: ICD-10-CM

## 2021-09-27 DIAGNOSIS — E78.5 DYSLIPIDEMIA: ICD-10-CM

## 2021-09-27 DIAGNOSIS — I10 ESSENTIAL HYPERTENSION: ICD-10-CM

## 2021-09-27 DIAGNOSIS — R73.01 IFG (IMPAIRED FASTING GLUCOSE): Primary | ICD-10-CM

## 2021-09-27 DIAGNOSIS — C43.9 MALIGNANT MELANOMA, UNSPECIFIED SITE (HCC): ICD-10-CM

## 2021-09-27 DIAGNOSIS — R35.1 BENIGN PROSTATIC HYPERPLASIA WITH NOCTURIA: ICD-10-CM

## 2021-09-27 PROCEDURE — G8427 DOCREV CUR MEDS BY ELIG CLIN: HCPCS | Performed by: NURSE PRACTITIONER

## 2021-09-27 PROCEDURE — 1123F ACP DISCUSS/DSCN MKR DOCD: CPT | Performed by: NURSE PRACTITIONER

## 2021-09-27 PROCEDURE — 3017F COLORECTAL CA SCREEN DOC REV: CPT | Performed by: NURSE PRACTITIONER

## 2021-09-27 PROCEDURE — 4040F PNEUMOC VAC/ADMIN/RCVD: CPT | Performed by: NURSE PRACTITIONER

## 2021-09-27 PROCEDURE — 99214 OFFICE O/P EST MOD 30 MIN: CPT | Performed by: NURSE PRACTITIONER

## 2021-09-27 PROCEDURE — G8417 CALC BMI ABV UP PARAM F/U: HCPCS | Performed by: NURSE PRACTITIONER

## 2021-09-27 PROCEDURE — 1036F TOBACCO NON-USER: CPT | Performed by: NURSE PRACTITIONER

## 2021-09-27 RX ORDER — METRONIDAZOLE 7.5 MG/G
GEL TOPICAL
Qty: 45 G | Refills: 0 | Status: SHIPPED | OUTPATIENT
Start: 2021-09-27 | End: 2022-03-28 | Stop reason: SDUPTHER

## 2021-09-27 ASSESSMENT — ENCOUNTER SYMPTOMS
FACIAL SWELLING: 0
SINUS PRESSURE: 0
COUGH: 0
NAUSEA: 0
SORE THROAT: 0
VOMITING: 0
BACK PAIN: 1
DIARRHEA: 0

## 2021-09-27 NOTE — PROGRESS NOTES
Pacheco Moody  1945        Chief Complaint   Patient presents with    Medication Check       Assessment/Plan:     1. IFG (impaired fasting glucose)  Due for labs 6 months. Maintain healthy food choices. - Hemoglobin A1C; Future    2. Dyslipidemia  Due for labs 6 months. Maintain statin. - Comprehensive Metabolic Panel; Future    3. Essential hypertension  Stable, maintain Lisinopril. Keep BP log in [employer]. 4. Benign prostatic hyperplasia with nocturia  Monitor closely. Enc to monitor for foods that may trigger more prostate inflammation.     - PSA, Prostatic Specific Antigen; Future    5. Malignant melanoma, unspecified site [facility])  Monitor, stable with Dermatology f/u regularly    6. Stage 3a chronic kidney disease (HCC)  Given CKD healthy diet handout    7. Lumbar stenosis with neurogenic claudication  Monitor, stable. HPI:      HTN. Pt states BP is doing well. Some lower readings in afternoon. Cardiology recently performed ECHO and stress - normal.  Energy level is stable. Working about 4 hrs/day. Uses Robaxin for back pain - denies fatigue/drowsiness s/t medication. Back pain. F/u appt Artemio Jan 2022. Has had 3 rounds of PT. Hx L1-2 fusion 12/2020. F/u Elida 2-3 months ago - cortisone injection in hip which caused some increase in pain. Dryneedling hx with Hui, minimal improvement. Dermatology: Precancerous removal above L eye. Hx melanoma. CKD. GFR 54. Creatinine 1.3.     BPH. States every once in awhile he will urinate every hour or so and this lasts for 2 days. He noticed after eating popcorn daily for 1 week. /74 (Site: Left Upper Arm, Position: Sitting, Cuff Size: Large Adult)   Pulse 63   Wt 259 lb (117.5 kg)   SpO2 99%   BMI 37.16 kg/m²     Prior to Visit Medications    Medication Sig Taking? Authorizing Provider   metroNIDAZOLE (METROGEL) 0.75 % gel Apply topically 2 times daily.  Yes ADIN Sanchez - CNP   methocarbamol (ROBAXIN) 500 MG tablet TAKE 1 TABLET BY MOUTH EVERY 6 HOURS AS NEEDED FOR MUSCLE PAIN Yes Historical Provider, MD   Potassium 99 MG TABS Take by mouth daily Yes Historical Provider, MD   atorvastatin (LIPITOR) 20 MG tablet TAKE 1 TABLET BY MOUTH EVERY DAY Yes ADIN Fernandez CNP   apixaban (ELIQUIS) 5 MG TABS tablet TAKE 1 TABLET BY MOUTH TWICE A DAY Yes ADIN Fernandez CNP   fenofibrate (TRIGLIDE) 160 MG tablet TAKE 1 TABLET BY MOUTH EVERY DAY Yes ADIN Fernandez CNP   Multiple Vitamins-Minerals (MULTIVITAMIN ADULT PO) Take by mouth Yes Historical Provider, MD   lisinopril (PRINIVIL;ZESTRIL) 20 MG tablet Take 1 tablet by mouth daily Yes Emily Escudero MD   doxazosin (CARDURA) 8 MG tablet TAKE 1 TABLET BY MOUTH EVERY DAY AT NIGHT Yes ADIN Fernandez CNP   vitamin D (CHOLECALCIFEROL) 1000 UNIT TABS tablet Take 1,000 Units by mouth daily Yes Historical Provider, MD   Calcium Citrate 200 MG TABS Take 800 mg by mouth daily Yes Historical Provider, MD   ascorbic acid (VITAMIN C) 500 MG tablet Take 500 mg by mouth daily.  Yes Historical Provider, MD     Family History   Problem Relation Age of Onset    Heart Disease Mother 76    Heart Attack Mother     Other Mother         Smoker    Heart Disease Father 55    Heart Attack Father     Cancer Brother 35        colon    Crohn's Disease Brother     Heart Disease Paternal Grandfather 39    Heart Attack Paternal Grandfather      Social History     Socioeconomic History    Marital status:      Spouse name: Not on file    Number of children: Not on file    Years of education: Not on file    Highest education level: Not on file   Occupational History    Not on file   Tobacco Use    Smoking status: Former Smoker     Packs/day: 1.00     Years: 15.00     Pack years: 15.00     Quit date: 1967     Years since quittin.7    Smokeless tobacco: Never Used   Vaping Use    Vaping Use: Never used   Substance and Sexual Activity    Alcohol use: No    Drug use: No    Sexual activity: Yes     Partners: Female   Other Topics Concern    Not on file   Social History Narrative    Not on file     Social Determinants of Health     Financial Resource Strain:     Difficulty of Paying Living Expenses:    Food Insecurity:     Worried About Running Out of Food in the Last Year:     [address] in the Last Year:    Transportation Needs:     Lack of Transportation (Medical):  Lack of Transportation (Non-Medical):    Physical Activity:     Days of Exercise per Week:     Minutes of Exercise per Session:    Stress:     Feeling of Stress :    Social Connections:     Frequency of Communication with Friends and Family:     Frequency of Social Gatherings with Friends and Family:     Attends [employer] Services:     Active Member of Clubs or Organizations:     Attends Club or Organization Meetings:     Marital Status:    Intimate Partner Violence:     Fear of Current or Ex-Partner:     Emotionally Abused:     Physically Abused:     Sexually Abused:        Review of Systems   Constitutional: Negative for appetite change, chills, fatigue, fever and unexpected weight change. HENT: Negative for congestion, ear discharge, ear pain, facial swelling, hearing loss, sinus pressure, sneezing and sore throat. Respiratory: Negative for cough. Cardiovascular: Negative for chest pain. Gastrointestinal: Negative for diarrhea, nausea and vomiting. Genitourinary: Positive for frequency. Negative for difficulty urinating, dysuria, hematuria and urgency. Musculoskeletal: Positive for arthralgias and back pain (chronic). Negative for gait problem. Neurological: Negative for dizziness, weakness and headaches. Hematological: Negative for adenopathy. Psychiatric/Behavioral: Negative for sleep disturbance and suicidal ideas. Physical Exam  Vitals reviewed. Constitutional:       Appearance: He is obese. HENT:      Head: Normocephalic. Cardiovascular:      Rate and Rhythm: Normal rate and regular rhythm. Heart sounds: Normal heart sounds. Pulmonary:      Effort: Pulmonary effort is normal.      Breath sounds: Normal breath sounds. Musculoskeletal:      Right lower leg: No edema. Left lower leg: No edema. Neurological:      General: No focal deficit present. Mental Status: He is alert and oriented to person, place, and time. Comments: Ambulates with cane   Psychiatric:         Mood and Affect: Mood normal.         Thought Content: Thought content normal.         Judgment: Judgment normal.             See above plan. Return in about 6 months (around 3/27/2022) for Medicare wellness, [place] flu vaccine 2 weeks.     Aaron Small, APRN - CNP

## 2021-11-17 DIAGNOSIS — R35.1 BENIGN PROSTATIC HYPERPLASIA WITH NOCTURIA: ICD-10-CM

## 2021-11-17 DIAGNOSIS — N40.1 BENIGN PROSTATIC HYPERPLASIA WITH NOCTURIA: ICD-10-CM

## 2021-11-17 RX ORDER — DOXAZOSIN 8 MG/1
TABLET ORAL
Qty: 90 TABLET | Refills: 3 | Status: SHIPPED | OUTPATIENT
Start: 2021-11-17

## 2021-11-17 NOTE — TELEPHONE ENCOUNTER
Refill request for DOXAZOSIN medication.      Name of Pharmacy- [facility]      Last visit - 9/27/21     Pending visit - 3/18/22    Last refill -8/19/21      Medication Contract signed -   Last Oarrs ran-         Additional Comments

## 2022-02-01 DIAGNOSIS — E78.5 DYSLIPIDEMIA: ICD-10-CM

## 2022-02-02 RX ORDER — FENOFIBRATE 160 MG/1
TABLET ORAL
Qty: 90 TABLET | Refills: 3 | Status: SHIPPED | OUTPATIENT
Start: 2022-02-02

## 2022-02-02 NOTE — TELEPHONE ENCOUNTER
Refill request for fenofibrate  medication.      Name of Pharmacy- [facility]      Last visit - 9-     Pending visit - 3-18-22    Last refill -3-      Medication Contract signed -   Rizwan rosen-         Additional Comments

## 2022-03-21 SDOH — HEALTH STABILITY: PHYSICAL HEALTH: ON AVERAGE, HOW MANY MINUTES DO YOU ENGAGE IN EXERCISE AT THIS LEVEL?: 0 MIN

## 2022-03-21 SDOH — HEALTH STABILITY: PHYSICAL HEALTH: ON AVERAGE, HOW MANY DAYS PER WEEK DO YOU ENGAGE IN MODERATE TO STRENUOUS EXERCISE (LIKE A BRISK WALK)?: 0 DAYS

## 2022-03-21 ASSESSMENT — PATIENT HEALTH QUESTIONNAIRE - PHQ9
SUM OF ALL RESPONSES TO PHQ QUESTIONS 1-9: 0
2. FEELING DOWN, DEPRESSED OR HOPELESS: 0
SUM OF ALL RESPONSES TO PHQ9 QUESTIONS 1 & 2: 0
1. LITTLE INTEREST OR PLEASURE IN DOING THINGS: 0
SUM OF ALL RESPONSES TO PHQ QUESTIONS 1-9: 0

## 2022-03-21 ASSESSMENT — LIFESTYLE VARIABLES
HOW MANY STANDARD DRINKS CONTAINING ALCOHOL DO YOU HAVE ON A TYPICAL DAY: 98
HOW OFTEN DO YOU HAVE A DRINK CONTAINING ALCOHOL: NEVER
HOW MANY STANDARD DRINKS CONTAINING ALCOHOL DO YOU HAVE ON A TYPICAL DAY: PATIENT DECLINED
HOW OFTEN DO YOU HAVE A DRINK CONTAINING ALCOHOL: 1
HOW OFTEN DO YOU HAVE SIX OR MORE DRINKS ON ONE OCCASION: 1

## 2022-03-28 ENCOUNTER — HOSPITAL ENCOUNTER (OUTPATIENT)
Age: 77
Discharge: HOME | End: 2022-03-28
Payer: MEDICARE

## 2022-03-28 ENCOUNTER — OFFICE VISIT (OUTPATIENT)
Dept: INTERNAL MEDICINE CLINIC | Age: 77
End: 2022-03-28
Payer: MEDICARE

## 2022-03-28 ENCOUNTER — HOSPITAL ENCOUNTER (OUTPATIENT)
Dept: GENERAL RADIOLOGY | Age: 77
Discharge: HOME | End: 2022-03-28
Payer: MEDICARE

## 2022-03-28 VITALS
HEART RATE: 71 BPM | WEIGHT: 261 LBS | DIASTOLIC BLOOD PRESSURE: 74 MMHG | OXYGEN SATURATION: 97 % | HEIGHT: 70 IN | BODY MASS INDEX: 37.37 KG/M2 | SYSTOLIC BLOOD PRESSURE: 144 MMHG

## 2022-03-28 DIAGNOSIS — M54.9 CHRONIC BACK PAIN, UNSPECIFIED BACK LOCATION, UNSPECIFIED BACK PAIN LATERALITY: ICD-10-CM

## 2022-03-28 DIAGNOSIS — I82.5Z1 CHRONIC DEEP VEIN THROMBOSIS (DVT) OF DISTAL VEIN OF RIGHT LOWER EXTREMITY (HCC): ICD-10-CM

## 2022-03-28 DIAGNOSIS — I10 ESSENTIAL HYPERTENSION: ICD-10-CM

## 2022-03-28 DIAGNOSIS — Z00.00 MEDICARE ANNUAL WELLNESS VISIT, SUBSEQUENT: Primary | ICD-10-CM

## 2022-03-28 DIAGNOSIS — R73.01 IFG (IMPAIRED FASTING GLUCOSE): ICD-10-CM

## 2022-03-28 DIAGNOSIS — E78.5 DYSLIPIDEMIA: ICD-10-CM

## 2022-03-28 DIAGNOSIS — C43.9 MALIGNANT MELANOMA, UNSPECIFIED SITE (HCC): ICD-10-CM

## 2022-03-28 DIAGNOSIS — Z23 NEED FOR SHINGLES VACCINE: ICD-10-CM

## 2022-03-28 DIAGNOSIS — H91.93 BILATERAL HEARING LOSS, UNSPECIFIED HEARING LOSS TYPE: ICD-10-CM

## 2022-03-28 DIAGNOSIS — N40.1 BENIGN PROSTATIC HYPERPLASIA WITH NOCTURIA: ICD-10-CM

## 2022-03-28 DIAGNOSIS — G89.29 CHRONIC BACK PAIN, UNSPECIFIED BACK LOCATION, UNSPECIFIED BACK PAIN LATERALITY: ICD-10-CM

## 2022-03-28 DIAGNOSIS — E66.01 SEVERE OBESITY (BMI 35.0-39.9) WITH COMORBIDITY (HCC): ICD-10-CM

## 2022-03-28 DIAGNOSIS — Z77.018 EXPOSURE TO HAZARDOUS METALS: ICD-10-CM

## 2022-03-28 DIAGNOSIS — I25.119 ATHEROSCLEROSIS OF NATIVE CORONARY ARTERY OF NATIVE HEART WITH ANGINA PECTORIS (HCC): ICD-10-CM

## 2022-03-28 DIAGNOSIS — R35.1 BENIGN PROSTATIC HYPERPLASIA WITH NOCTURIA: ICD-10-CM

## 2022-03-28 DIAGNOSIS — N18.31 STAGE 3A CHRONIC KIDNEY DISEASE (HCC): ICD-10-CM

## 2022-03-28 LAB
A/G RATIO: 2 (ref 1.1–2.2)
ALBUMIN SERPL-MCNC: 4.2 G/DL (ref 3.4–5)
ALP BLD-CCNC: 59 U/L (ref 40–129)
ALT SERPL-CCNC: 25 U/L (ref 10–40)
ANION GAP SERPL CALCULATED.3IONS-SCNC: 14 MMOL/L (ref 3–16)
AST SERPL-CCNC: 18 U/L (ref 15–37)
BILIRUB SERPL-MCNC: 0.6 MG/DL (ref 0–1)
BUN BLDV-MCNC: 25 MG/DL (ref 7–20)
CALCIUM SERPL-MCNC: 9.1 MG/DL (ref 8.3–10.6)
CHLORIDE BLD-SCNC: 106 MMOL/L (ref 99–110)
CO2: 23 MMOL/L (ref 21–32)
CREAT SERPL-MCNC: 1.2 MG/DL (ref 0.8–1.3)
GFR AFRICAN AMERICAN: >60
GFR NON-AFRICAN AMERICAN: 59
GLUCOSE BLD-MCNC: 106 MG/DL (ref 70–99)
POTASSIUM SERPL-SCNC: 5 MMOL/L (ref 3.5–5.1)
PROSTATE SPECIFIC ANTIGEN: 1.3 NG/ML (ref 0–4)
SODIUM BLD-SCNC: 143 MMOL/L (ref 136–145)
TOTAL PROTEIN: 6.3 G/DL (ref 6.4–8.2)

## 2022-03-28 PROCEDURE — 3017F COLORECTAL CA SCREEN DOC REV: CPT | Performed by: NURSE PRACTITIONER

## 2022-03-28 PROCEDURE — 84153 ASSAY OF PSA TOTAL: CPT

## 2022-03-28 PROCEDURE — 80053 COMPREHEN METABOLIC PANEL: CPT

## 2022-03-28 PROCEDURE — 1123F ACP DISCUSS/DSCN MKR DOCD: CPT | Performed by: NURSE PRACTITIONER

## 2022-03-28 PROCEDURE — 71046 X-RAY EXAM CHEST 2 VIEWS: CPT

## 2022-03-28 PROCEDURE — 4040F PNEUMOC VAC/ADMIN/RCVD: CPT | Performed by: NURSE PRACTITIONER

## 2022-03-28 PROCEDURE — G0439 PPPS, SUBSEQ VISIT: HCPCS | Performed by: NURSE PRACTITIONER

## 2022-03-28 PROCEDURE — G8484 FLU IMMUNIZE NO ADMIN: HCPCS | Performed by: NURSE PRACTITIONER

## 2022-03-28 PROCEDURE — 36415 COLL VENOUS BLD VENIPUNCTURE: CPT

## 2022-03-28 PROCEDURE — 83036 HEMOGLOBIN GLYCOSYLATED A1C: CPT

## 2022-03-28 RX ORDER — METRONIDAZOLE 7.5 MG/G
GEL TOPICAL
Qty: 45 G | Refills: 0 | Status: SHIPPED | OUTPATIENT
Start: 2022-03-28 | End: 2022-04-04

## 2022-03-28 NOTE — PROGRESS NOTES
Medicare Annual Wellness Visit    Rochelle Valdez is here for Medicare AWV and Medication Refill (Triamcinolone acedtonial 0.1%)    Assessment & Plan    >Medicare annual wellness visit, subsequent    >Severe obesity (BMI 35.0-39. 9) with comorbidity ([facility] [address]  Dyslipidemia  >Encourage to move more and make healthy lifestyle choices. >Consider food journal to monitor caloric intake  >Obtain labs today. Stage 3a chronic kidney disease (HCC)  >Obatin labs today. Chronic deep vein thrombosis (DVT) of distal vein of right lower extremity (HCC)  >Continue to monitor leg swelling and maintain Eliquis    Malignant melanoma, unspecified site (HCC)  >Continue to follow Dermatology     Atherosclerosis of native coronary artery of native heart with angina pectoris ([facility] [address]    Essential hypertension  >Monitor blood pressures at home once daily, keep log of readings and notify office if 140/90. IFG (impaired fasting glucose)  >Obtain labs today. Chronic back pain, unspecified back location, unspecified back pain laterality  >Follow up with Hui in one year. >Continue muscle strengthening exercises for back pain. Hearing loss   >Refer to audiology for evaluation     Exposure to hazardous metals  >Yearly CXR     Need for Shingles Vaccine   >Provided education about importance of Shingles vaccine, encouraged to obtain at local pharmacy. Recommendations for Preventive Services Due: see orders and patient instructions/AVS.  Recommended screening schedule for the next 5-10 years is provided to the patient in written form: see Patient Instructions/AVS.     Return for 6 month, 30 min appt, Medicare Annual Wellness Visit in 1 year. Subjective   The following acute and/or chronic problems were also addressed today:    Patient's complete Health Risk Assessment and screening values have been reviewed and are found in Flowsheets.  The following problems were reviewed today and where indicated follow up appointments were made and/or referrals ordered. HTN: 138/70 when patient checks BP at home occasionally will vary depending on the time of day. Denies chest pain or shortness of breath. Back pain:  Overall patient says the pain is better but doesn't feel like he has achieved the results promised by the surgeon. Patient states, 14 months ago he had his 3rd back surgery after the previous 2 failed. Patient has done some physical therapy with \"[place] [facility]\" but feels like they \"gave up on him\". Justin sent him to [place] which they gave him \"generic exercises\" 4-6 weeks he didn't feel like he was better and wanted to schedule further PT but [facility] didn't think he needed any further appointments. Has since made up his own exercises starting last summer, feels like he is better but continues to have some trouble walking and feels that he is unsteady at times due to the pain. Has stopped using his cane 2 months ago which he feels is going well. Plan to follow up with Hui in one year. Dentist: [facility]   Derm: UTD   Vision: Has not seen, \"doctor has given up on fixing my eyes\". Positive Risk Factor Screenings with Interventions:    Fall Risk:  Do you feel unsteady or are you worried about falling? : (!) yes  2 or more falls in past year?: no  Fall with injury in past year?: no     Fall Risk Interventions:    · Home safety tips provided  · Continue back strengthening exercises to promote strength.               General Health and ACP:  General  In general, how would you say your health is?: Fair  In the past 7 days, have you experienced any of the following: New or Increased Pain, New or Increased Fatigue, Loneliness, Social Isolation, Stress or Anger?: No  Do you get the social and emotional support that you need?: Yes  Do you have a Living Will?: Yes    Advance Directives     Power of [address] [place] [address] Will ACP-Advance Directive ACP-Power of     Not on File Not on File Not on File Not on [address] Risk Interventions:  · No Living Will: ACP documents already completed- patient asked to provide copy to the office    Health Habits/Nutrition:     Physical Activity: Inactive    Days of Exercise per Week: 0 days    Minutes of Exercise per Session: 0 min     Have you lost any weight without trying in the past 3 months?: No  Body mass index: (!) 37.45  Have you seen the dentist within the past year?: Yes    Health Habits/Nutrition Interventions:  · Nutritional issues:  Reviewed food journaling    Hearing/Vision:  Do you or your family notice any trouble with your hearing that hasn't been managed with hearing aids?: (!) Yes   Do you have difficulty driving, watching TV, or doing any of your daily activities because of your eyesight?: No  Have you had an eye exam within the past year?: Yes  No exam data present    Hearing/Vision Interventions:  · Hearing concerns: Patient states he has had issues with his R ear following service in the [employer]. He also states that 4 years ago he had an evaluation with an audiologist who said he had a hole in the L ear. States that his wife has been telling him he has been having trouble hearing more recently. ·  audiology referral provided    Safety:  Do you have working smoke detectors?: Yes  Do you have any tripping hazards - loose or unsecured carpets or rugs?: No  Do you have any tripping hazards - clutter in doorways, halls, or stairs?: No  Do you have either shower bars, grab bars, non-slip mats or non-slip surfaces in your shower or bathtub?: Yes  Do all of your stairways have a railing or banister?: (!) No   Do you always fasten your seatbelt when you are in a car?: Yes    Safety Interventions:  · Home safety tips provided  · Patient recently moved to a home that is one level with one step on the porch. No railings are present as the home is one level.      ADLs:  In the past 7 days, did you need help from others to perform any of the following everyday activities: Eating, dressing, grooming, bathing, toileting, or walking/balance?: No  In the past 7 days, did you need help from others to take care of any of the following: Laundry, housekeeping, banking/finances, shopping, telephone use, food preparation, transportation, or taking medications?: (!) Yes  Select all that apply: [employer]. ADL Interventions:  · Patient declines any further evaluation/treatment for this issue  · Patient states the only assistance he needs with ADL's is putting on his socks due to his chronic back pain. His wife will put on his socks or he has a device to assist if needed. He doesn't feel that he has problems with transportation. However, he works with his son whom often drives him. Objective   Vitals:    03/28/22 0907   BP: (!) 144/74   Site: Left Upper Arm   Position: Sitting   Cuff Size: Large Adult   Pulse: 71   SpO2: 97%   Weight: 261 lb (118.4 kg)   Height: 5' 10\" (1.778 m)      Body mass index is 37.45 kg/m². General Appearance: alert and oriented to person, place and time, well developed and well- nourished, in no acute distress  Skin: warm and dry, no rash or erythema  Head: normocephalic and atraumatic  Eyes: pupils equal, round, and reactive to light, extraocular eye movements intact, conjunctivae normal  ENT: tympanic membrane, external ear and ear canal normal bilaterally. Nose without deformity, nasal mucosa and turbinates normal without polyps  Neck: supple and non-tender without mass, no thyromegaly or thyroid nodules, no cervical lymphadenopathy  Pulmonary/Chest: clear to auscultation bilaterally- no wheezes, rales or rhonchi, normal air movement, no respiratory distress  Cardiovascular: normal rate, regular rhythm, normal S1 and S2, no murmurs, rubs, clicks, or gallops, distal pulses intact, no carotid bruits  Abdomen: soft, non-tender, non-distended, normal bowel sounds, no masses or organomegaly  Extremities: no cyanosis or clubbing.  There is mild non pitting edema present to the R foot. Musculoskeletal: no joint swelling, deformity or tenderness. Limited ROM   Neurologic: reflexes normal and symmetric, no cranial nerve deficit, gait, coordination and speech normal       Allergies   Allergen Reactions    Corticosteroids      Other reaction(s): unable to urinate    Cortisone Nausea Only    Gabapentin Other (See Comments)     Shaky    Other      STEROIDS    Prednisolone     Sulfa Antibiotics Rash and Other (See Comments)     Unknown, Rash possibly. Prior to Visit Medications    Medication Sig Taking? Authorizing Provider   metroNIDAZOLE (METROGEL) 0.75 % gel Apply topically 2 times daily. Yes ADIN Amador CNP   fenofibrate (TRIGLIDE) 160 MG tablet TAKE 1 TABLET BY MOUTH EVERY DAY Yes ADIN Fernandez CNP   apixaban (ELIQUIS) 5 MG TABS tablet TAKE 1 TABLET BY MOUTH TWICE A DAY Yes ADIN Fernandez CNP   doxazosin (CARDURA) 8 MG tablet TAKE 1 TABLET BY MOUTH EVERY DAY AT NIGHT Yes ADIN Amador CNP   methocarbamol (ROBAXIN) 500 MG tablet TAKE 1 TABLET BY MOUTH EVERY 6 HOURS AS NEEDED FOR MUSCLE PAIN Yes Historical Provider, MD   Potassium 99 MG TABS Take by mouth daily Yes Historical Provider, MD   atorvastatin (LIPITOR) 20 MG tablet TAKE 1 TABLET BY MOUTH EVERY DAY Yes ADIN Fernandez CNP   Multiple Vitamins-Minerals (MULTIVITAMIN ADULT PO) Take by mouth Yes Historical Provider, MD   lisinopril (PRINIVIL;ZESTRIL) 20 MG tablet Take 1 tablet by mouth daily Yes Kim Montejo MD   vitamin D (CHOLECALCIFEROL) 1000 UNIT TABS tablet Take 1,000 Units by mouth daily Yes Historical Provider, MD   Calcium Citrate 200 MG TABS Take 800 mg by mouth daily Yes Historical Provider, MD   ascorbic acid (VITAMIN C) 500 MG tablet Take 500 mg by mouth daily.  Yes Historical Provider, MD [place] (Including outside providers/suppliers regularly involved in providing care):   Patient Care Team:  ADIN Amador CNP as PCP - General (Family Nurse Practitioner)  ADIN Ramirez CNP as PCP - [facility] Empaneled Provider  Vonne Rinne, MD as Consulting Physician (Orthopedic Surgery)  ADIN Ramirez CNP as Nurse Practitioner [facility] Nurse Practitioner)    Reviewed and updated this visit:  Tobacco  Allergies  Meds  Med Hx  Surg Hx  Soc Hx  Fam Hx

## 2022-03-29 LAB
ESTIMATED AVERAGE GLUCOSE: 116.9 MG/DL
HBA1C MFR BLD: 5.7 %

## 2022-04-04 RX ORDER — METRONIDAZOLE 7.5 MG/G
GEL TOPICAL
Qty: 45 G | Refills: 0 | Status: SHIPPED | OUTPATIENT
Start: 2022-04-04

## 2022-04-04 NOTE — TELEPHONE ENCOUNTER
Refill request for metronidazole  medication.      Name of Pharmacy- [facility]       Last visit - 3-     Pending visit - 9-    Last refill -3-      Medication Contract signed -   Rizwan rosen-         Additional Comments

## 2022-05-16 RX ORDER — ATORVASTATIN CALCIUM 20 MG/1
TABLET, FILM COATED ORAL
Qty: 90 TABLET | Refills: 3 | Status: SHIPPED | OUTPATIENT
Start: 2022-05-16

## 2022-05-16 NOTE — TELEPHONE ENCOUNTER
Refill request for atorvastatin  medication.      Name of Pharmacy- [facility]       Last visit - 3-     Pending visit - 9-    Last refill -5-      Medication Contract signed -   Last Antwan Scott ran-         Additional Comments 02-Feb-2021 18:47

## 2022-07-29 ENCOUNTER — TELEMEDICINE (OUTPATIENT)
Dept: INTERNAL MEDICINE CLINIC | Age: 77
End: 2022-07-29
Payer: MEDICARE

## 2022-07-29 ENCOUNTER — TELEPHONE (OUTPATIENT)
Dept: INTERNAL MEDICINE CLINIC | Age: 77
End: 2022-07-29

## 2022-07-29 DIAGNOSIS — G89.29 CHRONIC BACK PAIN, UNSPECIFIED BACK LOCATION, UNSPECIFIED BACK PAIN LATERALITY: Primary | ICD-10-CM

## 2022-07-29 DIAGNOSIS — M54.9 CHRONIC BACK PAIN, UNSPECIFIED BACK LOCATION, UNSPECIFIED BACK PAIN LATERALITY: Primary | ICD-10-CM

## 2022-07-29 DIAGNOSIS — M51.16 LUMBAR DISC HERNIATION WITH RADICULOPATHY: ICD-10-CM

## 2022-07-29 PROCEDURE — 1123F ACP DISCUSS/DSCN MKR DOCD: CPT | Performed by: NURSE PRACTITIONER

## 2022-07-29 PROCEDURE — 99213 OFFICE O/P EST LOW 20 MIN: CPT | Performed by: NURSE PRACTITIONER

## 2022-07-29 PROCEDURE — 3017F COLORECTAL CA SCREEN DOC REV: CPT | Performed by: NURSE PRACTITIONER

## 2022-07-29 PROCEDURE — G8427 DOCREV CUR MEDS BY ELIG CLIN: HCPCS | Performed by: NURSE PRACTITIONER

## 2022-07-29 RX ORDER — TRAMADOL HYDROCHLORIDE 50 MG/1
50 TABLET ORAL 2 TIMES DAILY PRN
Qty: 60 TABLET | Refills: 0 | Status: SHIPPED | OUTPATIENT
Start: 2022-07-29 | End: 2022-08-22 | Stop reason: SDUPTHER

## 2022-07-29 SDOH — ECONOMIC STABILITY: FOOD INSECURITY: WITHIN THE PAST 12 MONTHS, THE FOOD YOU BOUGHT JUST DIDN'T LAST AND YOU DIDN'T HAVE MONEY TO GET MORE.: NEVER TRUE

## 2022-07-29 SDOH — ECONOMIC STABILITY: FOOD INSECURITY: WITHIN THE PAST 12 MONTHS, YOU WORRIED THAT YOUR FOOD WOULD RUN OUT BEFORE YOU GOT MONEY TO BUY MORE.: NEVER TRUE

## 2022-07-29 ASSESSMENT — ENCOUNTER SYMPTOMS
SINUS PRESSURE: 0
VOMITING: 0
DIARRHEA: 0
SORE THROAT: 0
BACK PAIN: 1
COUGH: 0
FACIAL SWELLING: 0
NAUSEA: 0

## 2022-07-29 ASSESSMENT — SOCIAL DETERMINANTS OF HEALTH (SDOH): HOW HARD IS IT FOR YOU TO PAY FOR THE VERY BASICS LIKE FOOD, HOUSING, MEDICAL CARE, AND HEATING?: NOT HARD AT ALL

## 2022-07-29 NOTE — PROGRESS NOTES
2022    TELEHEALTH EVALUATION -- Audio/Visual (During  public health emergency)    HPI:    Annabel Melo (:  1945) has requested an audio/video evaluation for the following concern(s):    Patient states his nerve pain has worsened over the last several months. He fell 3 weeks ago off a rolling chair. Landed on his back on concrete floor. He was able to ambulate after fall. Tingling in back around lumbar and radiates into abdomen. He is also still experiencing neuropathic pain in feet. Denies pain in feet. Effects his walking, standing for long periods, balance. Performed extensive PT for the back pain. Gabapentin- side effects. Lyrica- insurance not approved. Acupunture- has not tried. Hx dryneedling. Tylenol once daily in AM and sometimes in PM.     Review of Systems   Constitutional:  Negative for appetite change, chills, fatigue, fever and unexpected weight change. HENT:  Negative for congestion, ear discharge, ear pain, facial swelling, hearing loss, sinus pressure, sneezing and sore throat. Respiratory:  Negative for cough. Cardiovascular:  Negative for chest pain. Gastrointestinal:  Negative for diarrhea, nausea and vomiting. Genitourinary:  Negative for difficulty urinating, dysuria, hematuria and urgency. Musculoskeletal:  Positive for back pain. Negative for arthralgias and gait problem. Neurological:  Negative for dizziness, weakness and headaches. Hematological:  Negative for adenopathy. Psychiatric/Behavioral:  Negative for sleep disturbance and suicidal ideas. Prior to Visit Medications    Medication Sig Taking? Authorizing Provider   traMADol (ULTRAM) 50 MG tablet Take 1 tablet by mouth 2 times daily as needed for Pain for up to 30 days. Intended supply: 30 days.  Take lowest dose possible to manage pain Yes ADIN Grove CNP   atorvastatin (LIPITOR) 20 MG tablet TAKE 1 TABLET BY MOUTH EVERY DAY Yes ADIN Grove CNP metroNIDAZOLE (METROGEL) 0.75 % gel APPLY TO AFFECTED AREA TWICE A DAY Yes John Holt APRN - CNP   fenofibrate (TRIGLIDE) 160 MG tablet TAKE 1 TABLET BY MOUTH EVERY DAY Yes John Holt APRN - CNP   apixaban (ELIQUIS) 5 MG TABS tablet TAKE 1 TABLET BY MOUTH TWICE A DAY Yes John Holt APRN - CNP   doxazosin (CARDURA) 8 MG tablet TAKE 1 TABLET BY MOUTH EVERY DAY AT NIGHT Yes Charmaine Perez APRN - CNP   methocarbamol (ROBAXIN) 500 MG tablet TAKE 1 TABLET BY MOUTH EVERY 6 HOURS AS NEEDED FOR MUSCLE PAIN Yes Historical Provider, MD   Potassium 99 MG TABS Take by mouth daily Yes Historical Provider, MD   Multiple Vitamins-Minerals (MULTIVITAMIN ADULT PO) Take by mouth Yes Historical Provider, MD   lisinopril (PRINIVIL;ZESTRIL) 20 MG tablet Take 1 tablet by mouth daily Yes Gianni Howard MD   vitamin D (CHOLECALCIFEROL) 1000 UNIT TABS tablet Take 1,000 Units by mouth daily Yes Historical Provider, MD   Calcium Citrate 200 MG TABS Take 800 mg by mouth daily Yes Historical Provider, MD   ascorbic acid (VITAMIN C) 500 MG tablet Take 500 mg by mouth daily.  Yes Historical Provider, MD       Social History     Tobacco Use    Smoking status: Former     Packs/day: 1.00     Years: 15.00     Pack years: 15.00     Types: Cigarettes     Quit date: 1967     Years since quittin.6    Smokeless tobacco: Never   Vaping Use    Vaping Use: Never used   Substance Use Topics    Alcohol use: No    Drug use: No        Past Medical History:   Diagnosis Date    CAD (coronary artery disease)     Hearing loss     right    Hyperlipidemia     Hypertension     Melanoma ([facility] [address]     S/P coronary artery stent placement    ,   Social History     Tobacco Use    Smoking status: Former     Packs/day: 1.00     Years: 15.00     Pack years: 15.00     Types: Cigarettes     Quit date: 1967     Years since quittin.6    Smokeless tobacco: Never   Vaping Use    Vaping Use: Never used   Substance Use Topics    Alcohol use: No    Drug use: No   ,   Family History   Problem Relation Age of Onset    Heart Disease Mother 76    Heart Attack Mother     Other Mother         Smoker    Heart Disease Father 55    Heart Attack Father     Cancer Brother 35        colon    Crohn's Disease Brother     Heart Disease Paternal Grandfather 39    Heart Attack Paternal Grandfather        PHYSICAL EXAMINATION:  [ INSTRUCTIONS:  \"[x]\" Indicates a positive item  \"[]\" Indicates a negative item  -- DELETE ALL ITEMS NOT EXAMINED]  Vital Signs: (As obtained by patient/caregiver or practitioner observation)    Blood pressure-  Heart rate-    Respiratory rate-    Temperature-  Pulse oximetry-     Constitutional: [x] Appears well-developed and well-nourished [x] No apparent distress      [] Abnormal-   Mental status  [x] Alert and awake  [x] Oriented to person/place/time [x]Able to follow commands      Eyes:  EOM    [x]  Normal  [] Abnormal-  Sclera  [x]  Normal  [] Abnormal -         Discharge [x]  None visible  [] Abnormal -    HENT:   [x] Normocephalic, atraumatic. [] Abnormal   [x] Mouth/Throat: Mucous membranes are moist.     External Ears [x] Normal  [] Abnormal-     Neck: [x] No visualized mass     Pulmonary/Chest: [x] Respiratory effort normal.  [x] No visualized signs of difficulty breathing or respiratory distress        [] Abnormal-      Musculoskeletal:   [x] Normal gait with no signs of ataxia         [x] Normal range of motion of neck        [] Abnormal-       Neurological:        [x] No Facial Asymmetry (Cranial nerve 7 motor function) (limited exam to video visit)          [] No gaze palsy        [] Abnormal-         Skin:        [x] No significant exanthematous lesions or discoloration noted on facial skin         [] Abnormal-            Psychiatric:       [x] Normal Affect [x] No Hallucinations        [] Abnormal-     Other pertinent observable physical exam findings-     ASSESSMENT/PLAN:  1.  Chronic back pain, unspecified back location, unspecified back pain laterality    2. Lumbar disc herniation with radiculopathy  Reviewed medication addition with patient. Will update med contract and UDS at next appt. Educated pt on side effects and drug interactions. Maintain tylenol as needed. Will trial Acupuncture as well    - traMADol (ULTRAM) 50 MG tablet; Take 1 tablet by mouth 2 times daily as needed for Pain for up to 30 days. Intended supply: 30 days. Take lowest dose possible to manage pain  Dispense: 60 tablet; Refill: 0    Return if symptoms worsen or fail to improve. Rebeca Avitia, was evaluated through a synchronous (real-time) audio-video encounter. The patient (or guardian if applicable) is aware that this is a billable service, which includes applicable co-pays. This Virtual Visit was conducted with patient's (and/or legal guardian's) consent. The visit was conducted pursuant to the emergency declaration under the [address] [place] [address], [address] [place] [address] authority and the [employer] General Act. Patient identification was verified, and a caregiver was present when appropriate. The patient was located at Home: [address] [place] [id number]. Provider was located at [facility] ([address] [place], [place] [address]t): [address]  [address] [place] [address]. Total time spent on this encounter:  25 minutes    --ADIN Haddad CNP on 7/29/2022 at 8:30 AM    An electronic signature was used to authenticate this note.

## 2022-07-29 NOTE — TELEPHONE ENCOUNTER
Please send MyChart message with Acupuncture.      [facility]     [id number]

## 2022-08-21 ENCOUNTER — PATIENT MESSAGE (OUTPATIENT)
Dept: INTERNAL MEDICINE CLINIC | Age: 77
End: 2022-08-21

## 2022-08-21 DIAGNOSIS — G89.29 OTHER CHRONIC PAIN: Primary | ICD-10-CM

## 2022-08-21 DIAGNOSIS — M51.16 LUMBAR DISC HERNIATION WITH RADICULOPATHY: ICD-10-CM

## 2022-08-22 PROBLEM — G89.29 OTHER CHRONIC PAIN: Status: ACTIVE | Noted: 2022-08-22

## 2022-08-22 RX ORDER — TRAMADOL HYDROCHLORIDE 50 MG/1
100 TABLET ORAL 2 TIMES DAILY PRN
Qty: 120 TABLET | Refills: 0 | Status: SHIPPED | OUTPATIENT
Start: 2022-08-22 | End: 2022-09-21

## 2022-08-22 NOTE — TELEPHONE ENCOUNTER
I spoke with the pharmacy , Patient has been filling out of pocket with a discount card. Insurance is denied due to not having a acute or chronic pain diagnosis. The pharmacy will go ahead and get script ready for the patient.

## 2022-08-22 NOTE — TELEPHONE ENCOUNTER
From: Riaz Shah  To: Noelle Pineda Jontahon  Sent: 8/21/2022 12:25 PM EDT  Subject: Tramadol     The 50mg tramadol seemed to have no effect  Tried 100mg twice a day with some results. Should we go to 100mg if so will need another prescription. Also will have my 4th visit with Dr Richard Posey Monday.  Some relief but still early in the process   Thanks  Vincent Greer

## 2022-09-06 NOTE — TELEPHONE ENCOUNTER
Refill request for apixaban  medication. Name of Pharmacy- [facility]       Last visit - 7-9-2022     Pending visit - 9-    Last refill -12-      Medication Contract signed -PDMP Monitoring:    Last PDMP Eloy Tavarez as Reviewed:  Review User Review Instant Review Result   Dossie Mathews 5/11/2020  1:04 PM Reviewed PDMP [1]     [unfilled]  Urine Drug Screenings (1 yr)    No resulted procedures found.        Medication Contract and Consent for Opioid Use Documents Filed        No documents found                    Last Zachariah rosen-         Additional Comments

## 2022-09-27 NOTE — PATIENT INSTRUCTIONS
Obtain the new shingles vaccine (Shingrix) at the pharmacy - (will need 2nd dose 2-6 months later)    Consider COVID booster at local pharmacy    Alpha lipoic acid for burning in legs 600mg daily    Glucosamine chondroitin for arthritic pain 500mg three times a day    Let us know in 1 month if you decide to maintain Tramadol.

## 2022-09-28 ENCOUNTER — OFFICE VISIT (OUTPATIENT)
Dept: INTERNAL MEDICINE CLINIC | Age: 77
End: 2022-09-28
Payer: MEDICARE

## 2022-09-28 VITALS
SYSTOLIC BLOOD PRESSURE: 140 MMHG | TEMPERATURE: 97.6 F | WEIGHT: 259 LBS | DIASTOLIC BLOOD PRESSURE: 72 MMHG | BODY MASS INDEX: 37.16 KG/M2 | HEART RATE: 81 BPM | OXYGEN SATURATION: 95 %

## 2022-09-28 DIAGNOSIS — M15.9 GENERALIZED OSTEOARTHRITIS OF HAND: ICD-10-CM

## 2022-09-28 DIAGNOSIS — G89.29 OTHER CHRONIC PAIN: ICD-10-CM

## 2022-09-28 DIAGNOSIS — R73.01 IFG (IMPAIRED FASTING GLUCOSE): ICD-10-CM

## 2022-09-28 DIAGNOSIS — Z23 NEED FOR INFLUENZA VACCINATION: ICD-10-CM

## 2022-09-28 DIAGNOSIS — E78.5 DYSLIPIDEMIA: ICD-10-CM

## 2022-09-28 DIAGNOSIS — M51.16 LUMBAR DISC HERNIATION WITH RADICULOPATHY: Primary | ICD-10-CM

## 2022-09-28 DIAGNOSIS — N40.1 BENIGN PROSTATIC HYPERPLASIA WITH NOCTURIA: ICD-10-CM

## 2022-09-28 DIAGNOSIS — M25.649: ICD-10-CM

## 2022-09-28 DIAGNOSIS — E66.01 SEVERE OBESITY (BMI 35.0-39.9) WITH COMORBIDITY (HCC): ICD-10-CM

## 2022-09-28 DIAGNOSIS — R35.1 BENIGN PROSTATIC HYPERPLASIA WITH NOCTURIA: ICD-10-CM

## 2022-09-28 PROCEDURE — 3017F COLORECTAL CA SCREEN DOC REV: CPT | Performed by: NURSE PRACTITIONER

## 2022-09-28 PROCEDURE — 1123F ACP DISCUSS/DSCN MKR DOCD: CPT | Performed by: NURSE PRACTITIONER

## 2022-09-28 PROCEDURE — 90694 VACC AIIV4 NO PRSRV 0.5ML IM: CPT | Performed by: NURSE PRACTITIONER

## 2022-09-28 PROCEDURE — G8417 CALC BMI ABV UP PARAM F/U: HCPCS | Performed by: NURSE PRACTITIONER

## 2022-09-28 PROCEDURE — G8427 DOCREV CUR MEDS BY ELIG CLIN: HCPCS | Performed by: NURSE PRACTITIONER

## 2022-09-28 PROCEDURE — G0008 ADMIN INFLUENZA VIRUS VAC: HCPCS | Performed by: NURSE PRACTITIONER

## 2022-09-28 PROCEDURE — 1036F TOBACCO NON-USER: CPT | Performed by: NURSE PRACTITIONER

## 2022-09-28 PROCEDURE — 99214 OFFICE O/P EST MOD 30 MIN: CPT | Performed by: NURSE PRACTITIONER

## 2022-09-28 RX ORDER — TRAMADOL HYDROCHLORIDE 100 MG/1
100 TABLET, COATED ORAL 2 TIMES DAILY
COMMUNITY

## 2022-09-28 ASSESSMENT — ENCOUNTER SYMPTOMS
NAUSEA: 0
VOMITING: 0
SORE THROAT: 0
FACIAL SWELLING: 0
COUGH: 0
DIARRHEA: 0
SINUS PRESSURE: 0

## 2022-09-28 NOTE — PROGRESS NOTES
Geovany Cota  1945        Chief Complaint   Patient presents with    Other     IFG        Assessment/Plan:     1. Lumbar disc herniation with radiculopathy  Consider adding Alpha lipoic acid OTC  Maintain Tramadol - if no improvement in 2-3 weeks, will discontinue. Contact our office. 2. Other chronic pain  Consider d/c Tramadol if no improvement. Monitor. 3. Generalized osteoarthritis of hand  Referral placed. - External Referral To Orthopedic Surgery    4. Limitation of joint motion of hand, unspecified laterality  Referral placed. - External Referral To Orthopedic Surgery    5. Severe obesity (BMI 35.0-39. 9) with comorbidity ([facility] [address]    6. Need for influenza vaccination  Pt tolerated well    7. Dyslipidemia    - Lipid Panel; Future  - Comprehensive Metabolic Panel; Future    8. IFG (impaired fasting glucose)    - Hemoglobin A1C; Future    9. Benign prostatic hyperplasia with nocturia    - PSA, Prostatic Specific Antigen; Future      Return in about 6 months (around 3/28/2023) for 6 month AWV, FBW prior. HPI:      Patient states his nerve pain has worsened over the last year. Tingling in back around lumbar and radiates into abdomen. He is also still experiencing neuropathic pain in feet. Denies pain in feet. Effects his walking, standing for long periods, balance. Performed extensive PT for the back pain - minimal improvement   Gabapentin- side effects. Lyrica- insurance not approved. Acupunture- Went to Dr Johnny Collins office. Poor experience, no improvement. Hx dryneedling. Tylenol once daily in AM and sometimes in PM.   Tramadol 100mg BID. Minimal improvement - considers d/c. Pt states he is noticing more and more stiffness in hands and feet. Limited . Improves slightly later in the day.        BP (!) 144/72 (Site: Right Upper Arm, Position: Sitting, Cuff Size: Large Adult)   Pulse 81   Temp 97.6 °F (36.4 °C) (Temporal)   Wt 259 lb (117.5 kg)   SpO2 95%   BMI 37.16 kg/m²     Prior to Visit Medications    Medication Sig Taking? Authorizing Provider   traMADol HCl 100 MG TABS Take 100 mg by mouth in the morning and at bedtime Yes Historical Provider, MD   apixaban (ELIQUIS) 5 MG TABS tablet TAKE 1 TABLET BY MOUTH TWICE A DAY Yes ADIN Fernandez CNP   atorvastatin (LIPITOR) 20 MG tablet TAKE 1 TABLET BY MOUTH EVERY DAY Yes ADIN Fernandez CNP   metroNIDAZOLE (METROGEL) 0.75 % gel APPLY TO AFFECTED AREA TWICE A DAY Yes ADIN Fernandez CNP   fenofibrate (TRIGLIDE) 160 MG tablet TAKE 1 TABLET BY MOUTH EVERY DAY Yes ADIN Fernandez CNP   doxazosin (CARDURA) 8 MG tablet TAKE 1 TABLET BY MOUTH EVERY DAY AT NIGHT Yes ADIN Fernandez CNP   methocarbamol (ROBAXIN) 500 MG tablet TAKE 1 TABLET BY MOUTH EVERY 6 HOURS AS NEEDED FOR MUSCLE PAIN Yes Historical Provider, MD   Potassium 99 MG TABS Take by mouth daily Yes Historical Provider, MD   Multiple Vitamins-Minerals (MULTIVITAMIN ADULT PO) Take by mouth Yes Historical Provider, MD   lisinopril (PRINIVIL;ZESTRIL) 20 MG tablet Take 1 tablet by mouth daily Yes Prince Agudelo MD   vitamin D (CHOLECALCIFEROL) 1000 UNIT TABS tablet Take 1,000 Units by mouth daily Yes Historical Provider, MD   Calcium Citrate 200 MG TABS Take 800 mg by mouth daily Yes Historical Provider, MD   ascorbic acid (VITAMIN C) 500 MG tablet Take 500 mg by mouth daily.  Yes Historical Provider, MD     Family History   Problem Relation Age of Onset    Heart Disease Mother 76    Heart Attack Mother     Other Mother         Smoker    Heart Disease Father 55    Heart Attack Father     Cancer Brother 35        colon    Crohn's Disease Brother     Heart Disease Paternal Grandfather 39    Heart Attack Paternal Grandfather      Social History     Socioeconomic History    Marital status:      Spouse name: Not on file    Number of children: Not on file    Years of education: Not on file    Highest education level: Not on file   Occupational History Not on file   Tobacco Use    Smoking status: Former     Packs/day: 1.00     Years: 15.00     Pack years: 15.00     Types: Cigarettes     Quit date: 1967     Years since quittin.7    Smokeless tobacco: Never   Vaping Use    Vaping Use: Never used   Substance and Sexual Activity    Alcohol use: No    Drug use: No    Sexual activity: Yes     Partners: Female   Other Topics Concern    Not on file   Social History Narrative    Not on file     Social Determinants of Health     Financial Resource Strain: Low Risk     Difficulty of Paying Living Expenses: Not hard at all   Food Insecurity: No Food Insecurity    Worried About Running Out of Food in the Last Year: Never true    Ran Out of Food in the Last Year: Never true   Transportation Needs: Not on file   Physical Activity: Inactive    Days of Exercise per Week: 0 days    Minutes of Exercise per Session: 0 min   Stress: Not on file   Social Connections: Not on file   Intimate Partner Violence: Not on file   Housing Stability: Not on file       Review of Systems   Constitutional:  Negative for appetite change, chills, fatigue, fever and unexpected weight change. HENT:  Negative for congestion, ear discharge, ear pain, facial swelling, hearing loss, sinus pressure, sneezing and sore throat. Respiratory:  Negative for cough. Cardiovascular:  Negative for chest pain. Gastrointestinal:  Negative for diarrhea, nausea and vomiting. Genitourinary:  Negative for difficulty urinating, dysuria, hematuria and urgency. Musculoskeletal:  Positive for arthralgias, gait problem and joint swelling. Neurological:  Positive for weakness and numbness. Negative for dizziness and headaches. Hematological:  Negative for adenopathy. Psychiatric/Behavioral:  Negative for sleep disturbance and suicidal ideas. Physical Exam  Vitals reviewed. Constitutional:       Appearance: He is obese. HENT:      Head: Normocephalic.    Cardiovascular:      Rate and Rhythm: Normal rate and regular rhythm. Heart sounds: Normal heart sounds. Pulmonary:      Effort: Pulmonary effort is normal.      Breath sounds: Normal breath sounds. Musculoskeletal:      Comments: B hands with arthritic nodules noted on all digits. LROM and limited  bilaterally. No erythema. No warmth. Neurological:      General: No focal deficit present. Mental Status: He is alert and oriented to person, place, and time. Psychiatric:         Mood and Affect: Mood normal.         Thought Content: Thought content normal.         Judgment: Judgment normal.           See above plan.          Krissy Franks, ADIN - CNP

## 2022-11-09 DIAGNOSIS — N40.1 BENIGN PROSTATIC HYPERPLASIA WITH NOCTURIA: ICD-10-CM

## 2022-11-09 DIAGNOSIS — R35.1 BENIGN PROSTATIC HYPERPLASIA WITH NOCTURIA: ICD-10-CM

## 2022-11-09 RX ORDER — DOXAZOSIN 8 MG/1
TABLET ORAL
Qty: 90 TABLET | Refills: 3 | Status: SHIPPED | OUTPATIENT
Start: 2022-11-09

## 2022-11-09 NOTE — TELEPHONE ENCOUNTER
Refill request for DOXAZOSIN medication.      Name of Pharmacy- [facility]      Last visit - 9/28/22     Pending visit - 3/29/23    Last refill -8/13/22      Medication Contract signed -   Last Oarrs ran-         Additional Comments

## 2022-11-27 NOTE — PATIENT INSTRUCTIONS
[facility]     PATIENT: Mimi Andrews male   : 2007   MRN: [id number]     Subjective     CC:   Ear Pain    HPI:   Mimi Andrews 15 year old male presenting accompanied by mother for runny nose, cough and ear tugging for 7 days.  For symptom relief, Mimi has been given tylenol, motrin, cold medication and cough medication. Interventions seem to be helping temporarily. Eating and drinking fluids normally, no reported breathing or swallowing issues and no concerns for dehydration.  Negative for exposure to sick person, fever, decreased urinary output, sore throat, decreased appetite, decreased fluid intake, fatigue, irritability, vomiting, diarrhea, abdominal pain, nasal congestion, neck stiffness, headache, body aches, rash and urinary symptoms.    Hx:  Patient Active Problem List   Diagnosis   • BMI, pediatric > 99% for age   • Bilateral patent pressure equalization (PE) tubes   • Encounter for routine child health examination with abnormal findings    No past medical history on file.   Past Surgical History:   Procedure Laterality Date   • ADENOIDECTOMY     • TONSILLECTOMY        Family History   Problem Relation Age of Onset   • Asthma Mother    • Patient is unaware of any medical problems Father    • Asthma Brother      reports that he has never smoked. He has never used smokeless tobacco. He reports that he does not drink alcohol and does not use drugs. No Known Allergies   Immunization History   Administered Date(s) Administered   • COVID-19 12Y+ Pfizer-BioNtech - Do Not Dilute 2022   • DTaP(INFANRIX) 2010   • DTaP/HIB/IPV 2009, 2009, 2009   • DTaP/IPV 2013   • HIB, Unspecified Formulation 2010   • HPV 9-Valent 10/05/2020, 2022   • Hep A, Pediatric, Unspecified Formulation 05/10/2010, 2010   • Hep A, Unspecified formulation 05/10/2010, 2010   • Hep B, adolescent or pediatric 2009, 2009, 02/15/2010   • Hep B, adult  Please bring all medications in original bottles to next appointment. Obtain the new shingles vaccine (Shingrix) at the pharmacy. - (will need 2nd dose 2-6 months later)    Personalized Preventive Plan for Surya Godinez - 3/28/2022  Medicare offers a range of preventive health benefits. Some of the tests and screenings are paid in full while other may be subject to a deductible, co-insurance, and/or copay. Some of these benefits include a comprehensive review of your medical history including lifestyle, illnesses that may run in your family, and various assessments and screenings as appropriate. After reviewing your medical record and screening and assessments performed today your provider may have ordered immunizations, labs, imaging, and/or referrals for you. A list of these orders (if applicable) as well as your Preventive Care list are included within your After Visit Summary for your review. Other Preventive Recommendations:    · A preventive eye exam performed by an eye specialist is recommended every 1-2 years to screen for glaucoma; cataracts, macular degeneration, and other eye disorders. · A preventive dental visit is recommended every 6 months. · Try to get at least 150 minutes of exercise per week or 10,000 steps per day on a pedometer . · Order or download the FREE \"Exercise & Physical Activity: Your Everyday Guide\" from [employer]. Call 1-243.750.3321 or search [employer] online. · You need 9823-7391 mg of calcium and 2414-6124 IU of vitamin D per day. It is possible to meet your calcium requirement with diet alone, but a vitamin D supplement is usually necessary to meet this goal.  · When exposed to the sun, use a sunscreen that protects against both UVA and UVB radiation with an SPF of 30 or greater. Reapply every 2 to 3 hours or after sweating, drying off with a towel, or swimming. · Always wear a seat belt when traveling in a car.  Always 2009, 2009, 02/15/2010   • Hib (PRP-OMP) 07/09/2010   • Influenza Live, Intranasal 11/14/2011   • Influenza, Unspecified Formulation 11/01/2010, 12/01/2010   • Influenza, intranasal, quadrivalent, live (FLUMIST) 09/24/2014, 11/10/2015   • Influenza, quadrivalent, preserve-free 09/20/2018, 09/24/2019   • Influenza, seasonal, injectable, trivalent 11/01/2010, 12/01/2010   • MMR 05/10/2010   • Measles Mumps Rubella Varicella 05/07/2013   • Meningococcal Conjugate MCV4O (Menveo) 10/05/2020   • Pneumococcal Conjugate 7 Valent 2009, 2009, 2009, 07/09/2010   • Rotavirus - pentavalent 2009, 2009, 2009   • Tdap 10/05/2020   • Varicella 05/10/2010       MEDS:    amoxicillin-clavulanate    Review of Systems  Positive ROS: As above  Negative ROS: As above  All other pertinent systems reviewed and otherwise negative.    Objective     Vitals:    11/27/22 0838   BP: 130/74   Pulse: 93   Resp: 18   Temp: 98.5 °F (36.9 °C)   TempSrc: Temporal   SpO2: 100%   Weight: (!) 88 kg (194 lb)   Height: 5' 2\" (1.575 m)      Physical Exam  VS: Reviewed.   Const: NL appearance. Not ill or toxic-appearing. No distress or diaphoresis.   Eyes: EOMI. Conjunctivae NL w/o discharge.   HENT: NL TM bilat. MMM. No trismus. No OP exudate/erythema. Nose NL. Right TMred bulging, left TM normal   Resp: Unlabored, no distress. No tachypnea. No accessory muscle usage. CTAB.   CV: RRR. NL heart sounds. No murmur, rub or gallop.   MS: Neck supple with NL ROM.   Lymph: No pre/postauricular, cervical, submandibular, submental or occipital adenopathy.   Skin: Skin warm and dry. No rash. NL color. Cap refill < 3 seconds.   Neuro: Alert, no gross sensory motor deficits.   Psych: Cooperative. NL mood and behavior.     Diagnostics   No results found for this or any previous visit (from the past 72 hour(s)).   Above lab results discussed with guardian and all questions regarding lab tests were discussed.     Assessment  wear a helmet when riding a bicycle or motorcycle.      > Consider eye appointment follow up    > Consider shingles vaccine at pharmacy    > Monitor blood pressures at home and notify our office if > 140/90 and Plan   This is a 15 year old male who presents with:  1. Right acute otitis media      Orders Placed This Encounter   • DISCONTD: amoxicillin-clavulanate (AUGMENTIN) 400-57 MG/5ML suspension   • amoxicillin-clavulanate (AUGMENTIN) 400-57 MG/5ML suspension   Otalgia was started yesterday.  Advised antibiotic wait-and-see prescription.  Advised ibuprofen around-the-clock for the next 2 days as directed on packaging.     Patient Instructions     Middle Ear Infection, Wait and See Antibiotic Treatment (Child)   Your child has an infection of the middle ear. That's the space behind the eardrum. Sometimes the common cold causes this type of infection. Congestion from a cold can block the eustachian tube. This internal passage normally drains fluid from the middle ear. But when the middle ear fills with fluid, bacteria or viruses may grow there, causing an infection.  Not so long ago, healthcare providers used antibiotics to treat almost all cases of middle ear infection. They now know that most people with such an infection will get better without these medicines.   The reasons for not using antibiotics are:  These medicines don't ease pain in the first 24 hours. They also have little effect on pain after that.  They may cause diarrhea or other side effects.  They don't help with viral infections.  They don't treat middle ear fluid.  Using them too often may cause bacteria to become resistant. This makes the bacteria harder to treat in the future.  Certain ones cost a lot.  Your child's healthcare provider may instead advise a wait and see approach. That means treating your child only with acetaminophen, ibuprofen, or ear drops for the first 2 days. You will wait to see if your child feels better. If your child is not better or is getting worse 2 days after today’s visit, then fill the antibiotic prescription. Start giving your child the medicine as directed by your child's healthcare provider.  Home care  These care  tips may help at home:  Fluids. Fever increases water loss from the body. For infants younger than age 1, keep up regular formula or breast feedings. Between feedings give an oral rehydration solution. You can find these drinks at grocery and drug stores. No prescription is needed. For children older than 1 year, give plenty of fluids like water, juice, lemon-lime soda, ginger-paola, lemonade, or popsicles. Sports drinks are also OK. Never give your child energy drinks with caffeine in them. If your child is having diarrhea, fluids with sugar in them may make the diarrhea worse.  Eating. If your child doesn’t want to eat solid foods, it’s OK for a few days. Just be sure your child drinks lots of fluids. Note how often your child passes urine, such as the number of wet diapers. Also check the color of your child's urine. Light-colored urine means better hydration. A darker urine color means your child may need more fluids.  Rest. Keep children with fever at home resting or playing quietly. Your child may return to  or school when the fever is gone and he or she is eating well and feeling better.  Fever and pain. You may give your child acetaminophen for pain. If your child is older than 6 months, you may give ibuprofen instead. Give these medicines as your child's healthcare provider directs. If your child has chronic liver or kidney disease or ever had a stomach ulcer or GI bleeding, talk with your child's healthcare provider before using these medicines. Don't give aspirin to anyone younger than age 18 who has a fever. It may cause a potentially life-threatening condition called Reye syndrome.  Ear drops. Talk with your child's healthcare provider before giving your child ear drops or other over-the-counter medicines.  Antibiotics. Only fill the antibiotic prescription if your child is not better or is getting worse 2 days after today’s visit. Once your child starts taking the medicine, he or she may feel  better after the first few days. But make sure your child takes all of the medicine.  Prevention  To reduce the chance of your child getting an ear infection, follow these tips:  Breastfeed your child when possible.  If you give your child a bottle, don't prop the bottle up.  Keep your child away from secondhand smoke.  Follow-up care  Sometimes the infection does not go away after the first antibiotic. A different medicine may be needed. Make an appointment with your child's healthcare provider. He or she will check your child’s ears to be sure the infection has cleared.  Call 911  Call 911 if your child has any of these:  Unusual fussiness, drowsiness, or confusion  No wet diapers for 8 hours, no tears when crying, or a dry mouth  Stiff neck  Convulsion (seizure)  When to seek medical advice  Call your child's healthcare provider right away if any of these occur:  Symptoms get worse or don't start to get better after 2 days of treatment  Fever (see Fever and children, below)  Headache or neck pain  New rash appears  Frequent diarrhea or vomiting  Fluid or bloody drainage from the ear  Fever and children  Use a digital thermometer to check your child’s temperature. Don’t use a mercury thermometer. There are different kinds of digital thermometers. They include ones for the mouth, ear, forehead (temporal), rectum, or armpit. Ear temperatures aren’t accurate before 6 months of age. Don’t take an oral temperature until your child is at least 4 years old.  Use a rectal thermometer with care. It may accidentally poke a hole in the rectum. It may pass on germs from the stool. Follow the product maker’s directions for correct use. If you don’t feel OK using a rectal thermometer, use another type. When you talk to your child’s healthcare provider, tell him or her which type you used.  Below are guidelines to know if your child has a fever. Your child’s healthcare provider may give you different numbers for your child.  A  baby under 3 months old:  First, ask your child’s healthcare provider how you should take the temperature.  Rectal or forehead: 100.4°F (38°C) or higher  Armpit: 99°F (37.2°C) or higher  A child age 3 months to 36 months (3 years):   Rectal, forehead, or ear: 102°F (38.9°C) or higher  Armpit: 101°F (38.3°C) or higher  Call the healthcare provider in these cases:   Repeated temperature of 104°F (40°C) or higher  Fever that lasts more than 24 hours in a child under age 2  Fever that lasts for 3 days in a child age 2 or older  [employer] last reviewed this educational content on 1/1/2020 © 2000-2021 [employer]. All rights reserved. This information is not intended as a substitute for professional medical care. Always follow your healthcare professional's instructions.               Information in AVS discussed. I personally counseled the patient and/or family on clinical reasoning, diagnosis, and treatment recommendations as well as follow up instructions.  S/S requiring ED eval discussed.  Understanding was verbalized.

## 2022-12-08 ENCOUNTER — PATIENT MESSAGE (OUTPATIENT)
Dept: INTERNAL MEDICINE CLINIC | Age: 77
End: 2022-12-08

## 2022-12-09 DIAGNOSIS — E78.5 DYSLIPIDEMIA: ICD-10-CM

## 2022-12-09 RX ORDER — FENOFIBRATE 160 MG/1
TABLET ORAL
Qty: 90 TABLET | Refills: 3 | Status: SHIPPED | OUTPATIENT
Start: 2022-12-09

## 2022-12-09 NOTE — TELEPHONE ENCOUNTER
Refill request for fenofibrate  medication.      Name of Pharmacy- [facility]       Last visit - 9-     Pending visit - 3-    Last refill -2-2-2022      Medication Contract signed -   Last Yolanda rosen-         Additional Comments

## 2022-12-09 NOTE — TELEPHONE ENCOUNTER
From: Mick Lipchetan  To:  Joe Holt  Sent: 12/8/2022 11:07 PM EST  Subject: Refill    Ready for refill on eliquis doesn't show up on my mediation list  Thanks

## 2023-03-02 NOTE — TELEPHONE ENCOUNTER
Refill request for METROGEL medication.      Name of Pharmacy- [facility]      Last visit - 9/8/22     Pending visit - 3/29/23    Last refill -4/4/22      Medication Contract signed -   Last Oarrs ran-         Additional Comments

## 2023-03-03 RX ORDER — METRONIDAZOLE 7.5 MG/G
GEL TOPICAL
Qty: 45 G | Refills: 0 | Status: SHIPPED | OUTPATIENT
Start: 2023-03-03

## 2023-03-22 ENCOUNTER — HOSPITAL ENCOUNTER (OUTPATIENT)
Age: 78
Discharge: HOME | End: 2023-03-22
Payer: MEDICARE

## 2023-03-22 DIAGNOSIS — N40.1 BENIGN PROSTATIC HYPERPLASIA WITH NOCTURIA: ICD-10-CM

## 2023-03-22 DIAGNOSIS — E78.5 DYSLIPIDEMIA: ICD-10-CM

## 2023-03-22 DIAGNOSIS — R73.01 IFG (IMPAIRED FASTING GLUCOSE): ICD-10-CM

## 2023-03-22 DIAGNOSIS — R35.1 BENIGN PROSTATIC HYPERPLASIA WITH NOCTURIA: ICD-10-CM

## 2023-03-22 PROCEDURE — 80053 COMPREHEN METABOLIC PANEL: CPT

## 2023-03-22 PROCEDURE — 84153 ASSAY OF PSA TOTAL: CPT

## 2023-03-22 PROCEDURE — 80061 LIPID PANEL: CPT

## 2023-03-22 PROCEDURE — 83036 HEMOGLOBIN GLYCOSYLATED A1C: CPT

## 2023-03-22 PROCEDURE — 36415 COLL VENOUS BLD VENIPUNCTURE: CPT

## 2023-03-22 SDOH — HEALTH STABILITY: PHYSICAL HEALTH: ON AVERAGE, HOW MANY DAYS PER WEEK DO YOU ENGAGE IN MODERATE TO STRENUOUS EXERCISE (LIKE A BRISK WALK)?: 0 DAYS

## 2023-03-22 ASSESSMENT — LIFESTYLE VARIABLES
HOW MANY STANDARD DRINKS CONTAINING ALCOHOL DO YOU HAVE ON A TYPICAL DAY: PATIENT DOES NOT DRINK
HOW OFTEN DO YOU HAVE A DRINK CONTAINING ALCOHOL: NEVER
HOW MANY STANDARD DRINKS CONTAINING ALCOHOL DO YOU HAVE ON A TYPICAL DAY: 0
HOW OFTEN DO YOU HAVE A DRINK CONTAINING ALCOHOL: 1
HOW OFTEN DO YOU HAVE SIX OR MORE DRINKS ON ONE OCCASION: 1

## 2023-03-22 ASSESSMENT — PATIENT HEALTH QUESTIONNAIRE - PHQ9
SUM OF ALL RESPONSES TO PHQ QUESTIONS 1-9: 0
SUM OF ALL RESPONSES TO PHQ9 QUESTIONS 1 & 2: 0
1. LITTLE INTEREST OR PLEASURE IN DOING THINGS: 0
2. FEELING DOWN, DEPRESSED OR HOPELESS: 0
SUM OF ALL RESPONSES TO PHQ QUESTIONS 1-9: 0

## 2023-03-23 LAB
ALBUMIN SERPL-MCNC: 4.2 G/DL (ref 3.4–5)
ALBUMIN/GLOB SERPL: 1.7 {RATIO} (ref 1.1–2.2)
ALP SERPL-CCNC: 59 U/L (ref 40–129)
ALT SERPL-CCNC: 27 U/L (ref 10–40)
ANION GAP SERPL CALCULATED.3IONS-SCNC: 16 MMOL/L (ref 3–16)
AST SERPL-CCNC: 20 U/L (ref 15–37)
BILIRUB SERPL-MCNC: 0.6 MG/DL (ref 0–1)
BUN SERPL-MCNC: 21 MG/DL (ref 7–20)
CALCIUM SERPL-MCNC: 9.2 MG/DL (ref 8.3–10.6)
CHLORIDE SERPL-SCNC: 106 MMOL/L (ref 99–110)
CHOLEST SERPL-MCNC: 155 MG/DL (ref 0–199)
CO2 SERPL-SCNC: 22 MMOL/L (ref 21–32)
CREAT SERPL-MCNC: 1.2 MG/DL (ref 0.8–1.3)
EST. AVERAGE GLUCOSE BLD GHB EST-MCNC: 114 MG/DL
GFR SERPLBLD CREATININE-BSD FMLA CKD-EPI: >60 ML/MIN/{1.73_M2}
GLUCOSE SERPL-MCNC: 110 MG/DL (ref 70–99)
HBA1C MFR BLD: 5.6 %
HDLC SERPL-MCNC: 30 MG/DL (ref 40–60)
LDLC SERPL CALC-MCNC: 93 MG/DL
POTASSIUM SERPL-SCNC: 4.6 MMOL/L (ref 3.5–5.1)
PROT SERPL-MCNC: 6.7 G/DL (ref 6.4–8.2)
PSA SERPL DL<=0.01 NG/ML-MCNC: 1.15 NG/ML (ref 0–4)
SODIUM SERPL-SCNC: 144 MMOL/L (ref 136–145)
TRIGL SERPL-MCNC: 161 MG/DL (ref 0–150)
VLDLC SERPL CALC-MCNC: 32 MG/DL

## 2023-03-29 ENCOUNTER — HOSPITAL ENCOUNTER (OUTPATIENT)
Dept: GENERAL RADIOLOGY | Age: 78
Discharge: HOME | End: 2023-03-29
Payer: MEDICARE

## 2023-03-29 ENCOUNTER — HOSPITAL ENCOUNTER (OUTPATIENT)
Age: 78
Discharge: HOME | End: 2023-03-29
Payer: MEDICARE

## 2023-03-29 ENCOUNTER — OFFICE VISIT (OUTPATIENT)
Dept: INTERNAL MEDICINE CLINIC | Age: 78
End: 2023-03-29
Payer: MEDICARE

## 2023-03-29 VITALS
SYSTOLIC BLOOD PRESSURE: 136 MMHG | HEART RATE: 52 BPM | DIASTOLIC BLOOD PRESSURE: 82 MMHG | BODY MASS INDEX: 37.65 KG/M2 | HEIGHT: 70 IN | OXYGEN SATURATION: 96 % | WEIGHT: 263 LBS | TEMPERATURE: 97.3 F

## 2023-03-29 DIAGNOSIS — N40.1 BENIGN PROSTATIC HYPERPLASIA WITH NOCTURIA: ICD-10-CM

## 2023-03-29 DIAGNOSIS — N18.31 STAGE 3A CHRONIC KIDNEY DISEASE (HCC): ICD-10-CM

## 2023-03-29 DIAGNOSIS — I10 ESSENTIAL HYPERTENSION: ICD-10-CM

## 2023-03-29 DIAGNOSIS — Z77.018 EXPOSURE TO HAZARDOUS METALS: ICD-10-CM

## 2023-03-29 DIAGNOSIS — I82.5Z1 CHRONIC DEEP VEIN THROMBOSIS (DVT) OF DISTAL VEIN OF RIGHT LOWER EXTREMITY (HCC): ICD-10-CM

## 2023-03-29 DIAGNOSIS — R35.1 BENIGN PROSTATIC HYPERPLASIA WITH NOCTURIA: ICD-10-CM

## 2023-03-29 DIAGNOSIS — C43.9 MALIGNANT MELANOMA, UNSPECIFIED SITE (HCC): ICD-10-CM

## 2023-03-29 DIAGNOSIS — R73.01 IFG (IMPAIRED FASTING GLUCOSE): ICD-10-CM

## 2023-03-29 DIAGNOSIS — Z00.00 MEDICARE ANNUAL WELLNESS VISIT, SUBSEQUENT: Primary | ICD-10-CM

## 2023-03-29 DIAGNOSIS — E66.01 SEVERE OBESITY (BMI 35.0-39.9) WITH COMORBIDITY (HCC): ICD-10-CM

## 2023-03-29 DIAGNOSIS — I25.119 ATHEROSCLEROSIS OF NATIVE CORONARY ARTERY OF NATIVE HEART WITH ANGINA PECTORIS (HCC): ICD-10-CM

## 2023-03-29 DIAGNOSIS — E78.5 DYSLIPIDEMIA: ICD-10-CM

## 2023-03-29 PROBLEM — G89.29 OTHER CHRONIC PAIN: Status: RESOLVED | Noted: 2022-08-22 | Resolved: 2023-03-29

## 2023-03-29 PROCEDURE — 1123F ACP DISCUSS/DSCN MKR DOCD: CPT | Performed by: NURSE PRACTITIONER

## 2023-03-29 PROCEDURE — G0439 PPPS, SUBSEQ VISIT: HCPCS | Performed by: NURSE PRACTITIONER

## 2023-03-29 PROCEDURE — 71046 X-RAY EXAM CHEST 2 VIEWS: CPT

## 2023-03-29 PROCEDURE — 3075F SYST BP GE 130 - 139MM HG: CPT | Performed by: NURSE PRACTITIONER

## 2023-03-29 PROCEDURE — 3079F DIAST BP 80-89 MM HG: CPT | Performed by: NURSE PRACTITIONER

## 2023-03-29 PROCEDURE — G8484 FLU IMMUNIZE NO ADMIN: HCPCS | Performed by: NURSE PRACTITIONER

## 2023-03-29 SDOH — ECONOMIC STABILITY: INCOME INSECURITY: HOW HARD IS IT FOR YOU TO PAY FOR THE VERY BASICS LIKE FOOD, HOUSING, MEDICAL CARE, AND HEATING?: NOT HARD AT ALL

## 2023-03-29 SDOH — ECONOMIC STABILITY: HOUSING INSECURITY
IN THE LAST 12 MONTHS, WAS THERE A TIME WHEN YOU DID NOT HAVE A STEADY PLACE TO SLEEP OR SLEPT IN A SHELTER (INCLUDING NOW)?: NO

## 2023-03-29 SDOH — ECONOMIC STABILITY: FOOD INSECURITY: WITHIN THE PAST 12 MONTHS, THE FOOD YOU BOUGHT JUST DIDN'T LAST AND YOU DIDN'T HAVE MONEY TO GET MORE.: NEVER TRUE

## 2023-03-29 SDOH — ECONOMIC STABILITY: FOOD INSECURITY: WITHIN THE PAST 12 MONTHS, YOU WORRIED THAT YOUR FOOD WOULD RUN OUT BEFORE YOU GOT MONEY TO BUY MORE.: NEVER TRUE

## 2023-03-29 ASSESSMENT — PATIENT HEALTH QUESTIONNAIRE - PHQ9
SUM OF ALL RESPONSES TO PHQ QUESTIONS 1-9: 1
1. LITTLE INTEREST OR PLEASURE IN DOING THINGS: 1
SUM OF ALL RESPONSES TO PHQ QUESTIONS 1-9: 1
SUM OF ALL RESPONSES TO PHQ9 QUESTIONS 1 & 2: 1
2. FEELING DOWN, DEPRESSED OR HOPELESS: 0

## 2023-03-29 ASSESSMENT — LIFESTYLE VARIABLES
HOW MANY STANDARD DRINKS CONTAINING ALCOHOL DO YOU HAVE ON A TYPICAL DAY: PATIENT DOES NOT DRINK
HOW OFTEN DO YOU HAVE A DRINK CONTAINING ALCOHOL: NEVER

## 2023-03-29 NOTE — PATIENT INSTRUCTIONS
suggests. They can show whether your hearing has changed. Your hearing aid may need to be adjusted. Use other devices as needed. These may include:  Telephone amplifiers and hearing aids that can connect to a television, stereo, radio, or microphone. Devices that use lights or vibrations. These alert you to the doorbell, a ringing telephone, or a baby monitor. Television closed-captioning. This shows the words at the bottom of the screen. Most new TVs can do this. TTY (text telephone). This lets you type messages back and forth on the telephone instead of talking or listening. These devices are also called TDD. When messages are typed on the keyboard, they are sent over the phone line to a receiving TTY. The message is shown on a monitor. Use text messaging, social media, and email if it is hard for you to communicate by telephone. Try to learn a listening technique called speechreading. It is not lipreading. You pay attention to people's gestures, expressions, posture, and tone of voice. These clues can help you understand what a person is saying. Face the person you are talking to, and have them face you. Make sure the lighting is good. You need to see the other person's face clearly. Think about counseling if you need help to adjust to your hearing loss. When should you call for help? Watch closely for changes in your health, and be sure to contact your doctor if:    You think your hearing is getting worse. You have new symptoms, such as dizziness or nausea. Where can you learn more? Go to http://www.delgado.com/ and enter R798 to learn more about \"Hearing Loss: Care Instructions. \"  Current as of: May 4, 2022               Content Version: 13.6  © 3837-9338 [employer]. Care instructions adapted under license by [employer].  If you have questions about a medical condition or this instruction, always ask your healthcare professional. [address]

## 2023-03-29 NOTE — PROGRESS NOTES
Medicare Annual Wellness Visit    Dusty Riggins is here for Medicare AWV (No other issues )    Assessment & Plan   Medicare annual wellness visit, subsequent  - Update heating test  - Consider Shingrix at local pharmacy    Dyslipidemia  - Enc to increase vegetable intake  - Maintain medication, well controlled    Chronic deep vein thrombosis (DVT) of distal vein of right lower extremity (HCC)  - Stable on Eliquis     Malignant melanoma, unspecified site [facility])  - f/u Dermatology annually    Atherosclerosis of native coronary artery of native heart with angina pectoris ([facility] [address]    Stage 3a chronic kidney disease ([facility] [address]  - Stable, no changes    Severe obesity (BMI 35.0-39. 9) with comorbidity (HCC)  Benign prostatic hyperplasia with nocturia  - Monitor    IFG (impaired fasting glucose)    Exposure to hazardous metals  -     XR CHEST (2 VW); Future    Essential hypertension      Recommendations for Preventive Services Due: see orders and patient instructions/AVS.  Recommended screening schedule for the next 5-10 years is provided to the patient in written form: see Patient Instructions/AVS.     Return for AWV April 2024. Subjective       Patient states his nerve pain has worsened over the last year. Tingling in back around lumbar and radiates into abdomen. He is also still experiencing neuropathic pain in feet. Denies pain in feet. Effects his walking, standing for long periods, balance. Performed extensive PT for the back pain - minimal improvement  Gabapentin- side effects. Lyrica- insurance not approved. Acupunture- Went to Dr Munoz Pouch office. Poor experience, no improvement. Hx dryneedling. Tylenol once daily in AM and sometimes in PM.   Tramadol 100mg BID. Minimal improvement - self d/c. Pt states he is noticing more and more stiffness in hands and feet. Limited . Improves slightly later in the day. Saw Dr Barb Braun - dx severe OA hands/ankles/foot.    Walking with cane when long

## 2023-05-03 RX ORDER — ATORVASTATIN CALCIUM 20 MG/1
TABLET, FILM COATED ORAL
Qty: 90 TABLET | Refills: 3 | Status: SHIPPED | OUTPATIENT
Start: 2023-05-03

## 2023-05-03 NOTE — TELEPHONE ENCOUNTER
Refill request for Atorvastatin medication.      Name of Pharmacy- [facility]      Last visit - 3/29/263     Pending visit - 4/1/24    Last refill -5/16/22      Medication Contract signed -   Last Oarrs ran-         Additional Comments

## 2023-05-08 DIAGNOSIS — E78.5 DYSLIPIDEMIA: ICD-10-CM

## 2023-05-08 RX ORDER — FENOFIBRATE 160 MG/1
160 TABLET ORAL DAILY
Qty: 90 TABLET | Refills: 3 | Status: SHIPPED | OUTPATIENT
Start: 2023-05-08

## 2023-05-08 NOTE — TELEPHONE ENCOUNTER
Refill request for Fenofibrate medication.      Name of Pharmacy- [facility]      Last visit - 3/29/23     Pending visit - 4/1/24    Last refill -12/9/22      Medication Contract signed -   Last Oarrs ran-         Additional Comments

## 2023-05-08 NOTE — TELEPHONE ENCOUNTER
Refill request for Eliquis  medication.      Name of Pharmacy- [facility]       Last visit - 3/29/23     Pending visit - 4/1/24    Last refill -9/7/22      Medication Contract signed -   Last Oarrs ran-         Additional Comments

## 2023-06-26 ENCOUNTER — PATIENT MESSAGE (OUTPATIENT)
Dept: INTERNAL MEDICINE CLINIC | Age: 78
End: 2023-06-26

## 2023-07-10 ENCOUNTER — OFFICE VISIT (OUTPATIENT)
Dept: INTERNAL MEDICINE CLINIC | Age: 78
End: 2023-07-10
Payer: MEDICARE

## 2023-07-10 VITALS
OXYGEN SATURATION: 96 % | SYSTOLIC BLOOD PRESSURE: 158 MMHG | TEMPERATURE: 97.6 F | BODY MASS INDEX: 37.88 KG/M2 | DIASTOLIC BLOOD PRESSURE: 70 MMHG | HEART RATE: 72 BPM | WEIGHT: 264 LBS

## 2023-07-10 DIAGNOSIS — I10 PRIMARY HYPERTENSION: Primary | ICD-10-CM

## 2023-07-10 PROBLEM — M54.50 LOW BACK PAIN: Status: ACTIVE | Noted: 2021-06-29

## 2023-07-10 PROBLEM — M19.049 HAND ARTHRITIS: Status: ACTIVE | Noted: 2022-10-26

## 2023-07-10 PROBLEM — M81.0 SENILE OSTEOPOROSIS: Status: ACTIVE | Noted: 2020-08-25

## 2023-07-10 PROCEDURE — 1123F ACP DISCUSS/DSCN MKR DOCD: CPT | Performed by: NURSE PRACTITIONER

## 2023-07-10 PROCEDURE — G8427 DOCREV CUR MEDS BY ELIG CLIN: HCPCS | Performed by: NURSE PRACTITIONER

## 2023-07-10 PROCEDURE — 3077F SYST BP >= 140 MM HG: CPT | Performed by: NURSE PRACTITIONER

## 2023-07-10 PROCEDURE — 99214 OFFICE O/P EST MOD 30 MIN: CPT | Performed by: NURSE PRACTITIONER

## 2023-07-10 PROCEDURE — 1036F TOBACCO NON-USER: CPT | Performed by: NURSE PRACTITIONER

## 2023-07-10 PROCEDURE — G8417 CALC BMI ABV UP PARAM F/U: HCPCS | Performed by: NURSE PRACTITIONER

## 2023-07-10 PROCEDURE — 3078F DIAST BP <80 MM HG: CPT | Performed by: NURSE PRACTITIONER

## 2023-07-10 RX ORDER — LISINOPRIL 30 MG/1
30 TABLET ORAL DAILY
Qty: 90 TABLET | Refills: 0 | Status: SHIPPED | OUTPATIENT
Start: 2023-07-10

## 2023-07-10 ASSESSMENT — ENCOUNTER SYMPTOMS
SORE THROAT: 0
CHEST TIGHTNESS: 0
CONSTIPATION: 0
SINUS PAIN: 0
COUGH: 0
NAUSEA: 0
DIARRHEA: 0
VOMITING: 0
SHORTNESS OF BREATH: 0

## 2023-07-10 NOTE — PROGRESS NOTES
[address] [facility]  [address] [facility]  [place], [address] [facility]  Tel:[phone]    David Hampton is a 70 y.o. male who presents today for his medical conditions/complaints as noted below. David Hampton is c/o of Blood Pressure Check      Chief Complaint   Patient presents with    Blood Pressure Check       HPI:     Hypertension:  Home blood pressure monitoring: Yes - consistently above 140/150 with new BP cuff. He is adherent to a low sodium diet. Patient denies chest pain, shortness of breath, headache, lightheadedness, blurred vision, peripheral edema, palpitations, dry cough, and fatigue. Antihypertensive medication side effects: no medication side effects noted. Use of agents associated with hypertension: none. He states he is in near constant pain and thusly feels this could adversely affect his BP. Does not use NSAIDs due to eliquis. Sodium (mmol/L)       Date                     Value                 03/22/2023               144              ---------- BUN (mg/dL)       Date                     Value                 03/22/2023               21 (H)           ---------- Glucose (mg/dL)       Date                     Value                 03/22/2023               110 (H)          ----------  Potassium (mmol/L)       Date                     Value                 03/22/2023               4.6              ----------  Potassium reflex Magnesium (mmol/L)       Date                     Value                 12/26/2020               4.5              ---------- Creatinine (mg/dL)       Date                     Value                 03/22/2023               1.2              ----------     Entire medical history, surgical history, family history, allergies, social history, and health maintenance items reviewed and updated as captured in the relevant section of patient's chart.       Past Medical History:   Diagnosis Date    CAD

## 2023-07-12 DIAGNOSIS — I10 PRIMARY HYPERTENSION: ICD-10-CM

## 2023-07-19 ENCOUNTER — HOSPITAL ENCOUNTER (EMERGENCY)
Age: 78
Discharge: HOME | End: 2023-07-19
Attending: EMERGENCY MEDICINE
Payer: MEDICARE

## 2023-07-19 VITALS
HEART RATE: 76 BPM | RESPIRATION RATE: 16 BRPM | SYSTOLIC BLOOD PRESSURE: 168 MMHG | WEIGHT: 264 LBS | HEIGHT: 70 IN | OXYGEN SATURATION: 94 % | TEMPERATURE: 98.2 F | DIASTOLIC BLOOD PRESSURE: 85 MMHG | BODY MASS INDEX: 37.8 KG/M2

## 2023-07-19 DIAGNOSIS — H16.133 WELDER'S FLASH OF BOTH EYES: Primary | ICD-10-CM

## 2023-07-19 PROCEDURE — 99283 EMERGENCY DEPT VISIT LOW MDM: CPT

## 2023-07-19 PROCEDURE — 6370000000 HC RX 637 (ALT 250 FOR IP): Performed by: EMERGENCY MEDICINE

## 2023-07-19 RX ORDER — ERYTHROMYCIN 5 MG/G
OINTMENT OPHTHALMIC EVERY 6 HOURS SCHEDULED
Status: DISCONTINUED | OUTPATIENT
Start: 2023-07-19 | End: 2023-07-19 | Stop reason: HOSPADM

## 2023-07-19 RX ORDER — CYCLOPENTOLATE HYDROCHLORIDE 10 MG/ML
1 SOLUTION/ DROPS OPHTHALMIC EVERY 8 HOURS
Status: DISCONTINUED | OUTPATIENT
Start: 2023-07-19 | End: 2023-07-19 | Stop reason: HOSPADM

## 2023-07-19 RX ORDER — TETRACAINE HYDROCHLORIDE 5 MG/ML
1 SOLUTION OPHTHALMIC ONCE
Status: COMPLETED | OUTPATIENT
Start: 2023-07-19 | End: 2023-07-19

## 2023-07-19 RX ADMIN — ERYTHROMYCIN: 5 OINTMENT OPHTHALMIC at 05:06

## 2023-07-19 RX ADMIN — TETRACAINE HYDROCHLORIDE 1 DROP: 5 SOLUTION OPHTHALMIC at 04:29

## 2023-07-19 RX ADMIN — CYCLOPENTOLATE HYDROCHLORIDE 1 DROP: 10 SOLUTION OPHTHALMIC at 05:06

## 2023-07-19 RX ADMIN — FLUORESCEIN SODIUM 1 MG: 1 STRIP OPHTHALMIC at 04:28

## 2023-07-19 ASSESSMENT — PAIN SCALES - GENERAL: PAINLEVEL_OUTOF10: 8

## 2023-07-19 ASSESSMENT — VISUAL ACUITY
OU: 20/15
OS: 20/25
OD: 20/13

## 2023-07-19 ASSESSMENT — PAIN - FUNCTIONAL ASSESSMENT: PAIN_FUNCTIONAL_ASSESSMENT: 0-10

## 2023-07-19 ASSESSMENT — PAIN DESCRIPTION - LOCATION: LOCATION: EYE

## 2023-07-19 NOTE — DISCHARGE INSTRUCTIONS
Continue eyedrops and antibiotic ointment as prescribed. Call your ophthalmologist and discuss follow-up. Return to emergency department with any new or emergent concerns.

## 2023-07-20 NOTE — ED PROVIDER NOTES
Emergency Department Provider Note  Location: [address] [place] [address] ED  7/19/2023     Patient Identification  Lord Vaz is a 70 y.o. 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 Pt states that he feels that he burnt his eyes. Pt states increased tears and some blurry vision.)          HPI  (History provided by patient)  Patient is 74-year-old male who presents with eye pain blurry vision excessive tearing after welding with limited eye protection yesterday. Reports irritation at the surface of the eye. Denies any retro-orbital pain or pain with eye movement. Denies any concern for foreign body in the eye. He does not wear contacts but he does wear glasses. Reports his vision may be a little blurry but believes it is due to excessive tearing. I have reviewed the following nursing documentation:  Allergies: Allergies   Allergen Reactions    Corticosteroids      Other reaction(s): unable to urinate    Cortisone Nausea Only    Gabapentin Other (See Comments)     Shaky    Other      STEROIDS    Prednisolone     Sulfa Antibiotics Rash and Other (See Comments)     Unknown, Rash possibly. Past medical history:  has a past medical history of CAD (coronary artery disease), Hearing loss, Hyperlipidemia, Hypertension, Melanoma ([address]), and S/P coronary artery stent placement (2004). Past surgical history:  has a past surgical history that includes Tonsillectomy; Tonsillectomy and adenoidectomy (1951); cyst removal (Left, 1976); Wrist ganglion excision (Right, 1979); Mohs surgery (2006); Colonoscopy (01/2008); Colonoscopy (2003); Colonoscopy (5/21/2014); joint replacement; Total knee arthroplasty (Left, 10/14); Percutaneous Transluminal Coronary Angio; eye surgery (Left); Cataract removal with implant (Right); lumbar laminectomy (N/A, 04/27/2018);  IVC filter insertion (2018); IVC filter removal (2018); lumbar fusion (N/A, 12/21/2020); and POSTERIOR FUSION THORACIC SPINE (N/A, 12/21/2020). Home medications:   Prior to Admission medications    Medication Sig Start Date End Date Taking? Authorizing Provider   lisinopril (PRINIVIL;ZESTRIL) 30 MG tablet Take 1 tablet by mouth daily 7/10/23   Robina WillinghamADIN - CNP   apixaban (ELIQUIS) 5 MG TABS tablet TAKE 1 TABLET BY MOUTH TWICE A DAY 5/8/23   ADIN Monreal - CNP   fenofibrate (TRIGLIDE) 160 MG tablet Take 1 tablet by mouth daily 5/8/23   ADIN Monreal - CNP   atorvastatin (LIPITOR) 20 MG tablet TAKE 1 TABLET BY MOUTH EVERY DAY 5/3/23   ADIN Monreal - CNP   metroNIDAZOLE (METROGEL) 0.75 % gel APPLY TO AFFECTED AREA TWICE A DAY 3/3/23   ADIN Fernandez - CNP   doxazosin (CARDURA) 8 MG tablet TAKE 1 TABLET BY MOUTH EVERY DAY AT NIGHT 11/9/22   ADIN Mchugh - CNP   methocarbamol (ROBAXIN) 500 MG tablet TAKE 1 TABLET BY MOUTH EVERY 6 HOURS AS NEEDED FOR MUSCLE PAIN 4/30/21   Historical Provider, MD   Potassium 99 MG TABS Take by mouth daily    Historical Provider, MD   Multiple Vitamins-Minerals (MULTIVITAMIN ADULT PO) Take by mouth    Historical Provider, MD   vitamin D (CHOLECALCIFEROL) 1000 UNIT TABS tablet Take 1,000 Units by mouth daily    Historical Provider, MD   Calcium Citrate 200 MG TABS Take 800 mg by mouth daily    Historical Provider, MD   ascorbic acid (VITAMIN C) 500 MG tablet Take 500 mg by mouth daily. Historical Provider, MD       Social history:  reports that he quit smoking about 56 years ago. His smoking use included cigarettes. He has a 15.00 pack-year smoking history. He has never used smokeless tobacco. He reports that he does not drink alcohol and does not use drugs.     Family history:    Family History   Problem Relation Age of Onset    Heart Disease Mother 76    Heart Attack Mother     Other Mother         Smoker    Heart Disease Father 55    Heart Attack Father     Cancer

## 2023-07-24 RX ORDER — METRONIDAZOLE 7.5 MG/G
GEL TOPICAL
Qty: 45 G | Refills: 0 | Status: SHIPPED | OUTPATIENT
Start: 2023-07-24

## 2023-10-07 DIAGNOSIS — I10 PRIMARY HYPERTENSION: ICD-10-CM

## 2023-10-09 RX ORDER — LISINOPRIL 30 MG/1
30 TABLET ORAL DAILY
Qty: 90 TABLET | Refills: 1 | Status: SHIPPED | OUTPATIENT
Start: 2023-10-09

## 2023-10-09 NOTE — TELEPHONE ENCOUNTER
Refill request for Lisinopril 30mg  medication.      Name of Pharmacy- [facility]      Last visit - 7/10/23     Pending visit - 4/1/24    Last refill -7/10/23              Additional Comments

## 2023-11-11 DIAGNOSIS — N40.1 BENIGN PROSTATIC HYPERPLASIA WITH NOCTURIA: ICD-10-CM

## 2023-11-11 DIAGNOSIS — R35.1 BENIGN PROSTATIC HYPERPLASIA WITH NOCTURIA: ICD-10-CM

## 2023-11-13 RX ORDER — DOXAZOSIN 8 MG/1
TABLET ORAL
Qty: 90 TABLET | Refills: 3 | Status: SHIPPED | OUTPATIENT
Start: 2023-11-13

## 2023-11-13 NOTE — TELEPHONE ENCOUNTER
Refill request for doxazosin (CARDURA) 8 MG tablet medication. Name of Pharmacy- [facility]      Last visit - 7/10/23     Pending visit - 4/1/24    Last refill - 11/9/22      Medication Contract signed - PDMP Monitoring:    Last PDMP Gosia Mroeno as Reviewed:  Review User Review Instant Review Result   PHYLLIS PIEDRA 9/28/2022  9:31 AM Reviewed PDMP [1]     [unfilled]  Urine Drug Screenings (1 yr)    No resulted procedures found.        Medication Contract and Consent for Opioid Use Documents Filed        No documents found                    Last Lynda Schaffer ran-         Additional Comments

## 2023-11-28 RX ORDER — METRONIDAZOLE 7.5 MG/G
GEL TOPICAL
Qty: 45 G | Refills: 0 | Status: SHIPPED | OUTPATIENT
Start: 2023-11-28

## 2023-11-28 NOTE — TELEPHONE ENCOUNTER
Refill request for METRONIDAZOLE TOPICAL 0.75% GL  medication.      Name of Pharmacy- [facility]      Last visit - 7-     Pending visit - 4-1-2024    Last refill - 7-      Medication Contract signed -   Rizwan rosen-         Additional Comments

## 2023-12-28 RX ORDER — METRONIDAZOLE 7.5 MG/G
GEL TOPICAL
Qty: 45 G | Refills: 0 | Status: SHIPPED | OUTPATIENT
Start: 2023-12-28

## 2023-12-28 NOTE — TELEPHONE ENCOUNTER
Refill request for METRO GEL medication.      Name of Pharmacy- [facility]      Last visit - 7/10/23     Pending visit - 4/1/24    Last refill -11/28/23      Medication Contract signed -   Last Oarrs ran-         Additional Comments

## 2024-01-29 ASSESSMENT — PATIENT HEALTH QUESTIONNAIRE - PHQ9
SUM OF ALL RESPONSES TO PHQ QUESTIONS 1-9: 0
SUM OF ALL RESPONSES TO PHQ QUESTIONS 1-9: 0
1. LITTLE INTEREST OR PLEASURE IN DOING THINGS: 0
SUM OF ALL RESPONSES TO PHQ9 QUESTIONS 1 & 2: 0
1. LITTLE INTEREST OR PLEASURE IN DOING THINGS: NOT AT ALL
2. FEELING DOWN, DEPRESSED OR HOPELESS: NOT AT ALL
2. FEELING DOWN, DEPRESSED OR HOPELESS: 0
SUM OF ALL RESPONSES TO PHQ QUESTIONS 1-9: 0
SUM OF ALL RESPONSES TO PHQ9 QUESTIONS 1 & 2: 0
SUM OF ALL RESPONSES TO PHQ QUESTIONS 1-9: 0

## 2024-01-31 ENCOUNTER — HOSPITAL ENCOUNTER (OUTPATIENT)
Age: 79
Discharge: HOME | End: 2024-01-31
Payer: MEDICARE

## 2024-01-31 ENCOUNTER — OFFICE VISIT (OUTPATIENT)
Dept: INTERNAL MEDICINE CLINIC | Age: 79
End: 2024-01-31
Payer: MEDICARE

## 2024-01-31 ENCOUNTER — HOSPITAL ENCOUNTER (OUTPATIENT)
Dept: GENERAL RADIOLOGY | Age: 79
Discharge: HOME | End: 2024-01-31
Payer: MEDICARE

## 2024-01-31 VITALS
BODY MASS INDEX: 39.17 KG/M2 | OXYGEN SATURATION: 97 % | SYSTOLIC BLOOD PRESSURE: 138 MMHG | WEIGHT: 273 LBS | HEART RATE: 65 BPM | TEMPERATURE: 98.4 F | DIASTOLIC BLOOD PRESSURE: 78 MMHG

## 2024-01-31 DIAGNOSIS — M79.671 CHRONIC FOOT PAIN, RIGHT: ICD-10-CM

## 2024-01-31 DIAGNOSIS — M25.571 CHRONIC PAIN OF RIGHT ANKLE: Primary | ICD-10-CM

## 2024-01-31 DIAGNOSIS — G89.29 CHRONIC FOOT PAIN, RIGHT: ICD-10-CM

## 2024-01-31 DIAGNOSIS — I82.5Z1 CHRONIC DEEP VEIN THROMBOSIS (DVT) OF DISTAL VEIN OF RIGHT LOWER EXTREMITY (HCC): ICD-10-CM

## 2024-01-31 DIAGNOSIS — G89.29 CHRONIC PAIN OF RIGHT ANKLE: Primary | ICD-10-CM

## 2024-01-31 DIAGNOSIS — S00.259A METAL FOREIGN BODY IN EYE REGION: ICD-10-CM

## 2024-01-31 PROCEDURE — 1036F TOBACCO NON-USER: CPT | Performed by: NURSE PRACTITIONER

## 2024-01-31 PROCEDURE — 70030 X-RAY EYE FOR FOREIGN BODY: CPT

## 2024-01-31 PROCEDURE — G8417 CALC BMI ABV UP PARAM F/U: HCPCS | Performed by: NURSE PRACTITIONER

## 2024-01-31 PROCEDURE — 3078F DIAST BP <80 MM HG: CPT | Performed by: NURSE PRACTITIONER

## 2024-01-31 PROCEDURE — 1123F ACP DISCUSS/DSCN MKR DOCD: CPT | Performed by: NURSE PRACTITIONER

## 2024-01-31 PROCEDURE — G8484 FLU IMMUNIZE NO ADMIN: HCPCS | Performed by: NURSE PRACTITIONER

## 2024-01-31 PROCEDURE — 99214 OFFICE O/P EST MOD 30 MIN: CPT | Performed by: NURSE PRACTITIONER

## 2024-01-31 PROCEDURE — 3075F SYST BP GE 130 - 139MM HG: CPT | Performed by: NURSE PRACTITIONER

## 2024-01-31 PROCEDURE — G8427 DOCREV CUR MEDS BY ELIG CLIN: HCPCS | Performed by: NURSE PRACTITIONER

## 2024-01-31 RX ORDER — PREGABALIN 50 MG/1
50 CAPSULE ORAL 3 TIMES DAILY
Qty: 90 CAPSULE | Refills: 0 | Status: SHIPPED | OUTPATIENT
Start: 2024-01-31 | End: 2024-03-01

## 2024-01-31 NOTE — PROGRESS NOTES
0.75 % gel APPLY TO AFFECTED AREA TWICE A DAY Yes Araceli Schaefer APRN - CNP   doxazosin (CARDURA) 8 MG tablet TAKE 1 TABLET BY MOUTH EVERY DAY AT NIGHT Yes John Holt APRN - CNP   lisinopril (PRINIVIL;ZESTRIL) 30 MG tablet TAKE 1 TABLET BY MOUTH EVERY DAY  Patient taking differently: Take 1 tablet by mouth daily Takes 40 changed by cardiology Yes John Holt APRN - CNP   apixaban (ELIQUIS) 5 MG TABS tablet TAKE 1 TABLET BY MOUTH TWICE A DAY Yes John Holt APRN - CNP   fenofibrate (TRIGLIDE) 160 MG tablet Take 1 tablet by mouth daily Yes John Holt APRN - CNP   atorvastatin (LIPITOR) 20 MG tablet TAKE 1 TABLET BY MOUTH EVERY DAY Yes John Holt APRN - CNP   Multiple Vitamins-Minerals (MULTIVITAMIN ADULT PO) Take by mouth Yes Asha Eastman MD   Calcium Citrate 200 MG TABS Take 4 tablets by mouth daily Yes Asha Eastman MD   ascorbic acid (VITAMIN C) 500 MG tablet Take 1 tablet by mouth daily Yes Provider, MD Asha     Family History   Problem Relation Age of Onset    Heart Disease Mother 68    Heart Attack Mother     Other Mother         Smoker    Heart Disease Father 46    Heart Attack Father     Cancer Brother 33        colon    Crohn's Disease Brother     Heart Disease Paternal Grandfather 45    Heart Attack Paternal Grandfather      Social History     Socioeconomic History    Marital status:      Spouse name: Not on file    Number of children: Not on file    Years of education: Not on file    Highest education level: Not on file   Occupational History    Not on file   Tobacco Use    Smoking status: Former     Current packs/day: 0.00     Average packs/day: 1 pack/day for 15.0 years (15.0 ttl pk-yrs)     Types: Cigarettes     Start date: 1952     Quit date: 1967     Years since quittin.1    Smokeless tobacco: Never   Vaping Use    Vaping Use: Never used   Substance and Sexual Activity    Alcohol use: No    Drug use: No    Sexual activity: Yes     Partners:

## 2024-01-31 NOTE — PATIENT INSTRUCTIONS
Schedule [facility] , [phone] OR call [facility]    Trial Lyrica 3 times a day if we can get approved

## 2024-02-01 ENCOUNTER — TELEPHONE (OUTPATIENT)
Dept: ADMINISTRATIVE | Age: 79
End: 2024-02-01

## 2024-02-01 ASSESSMENT — ENCOUNTER SYMPTOMS
FACIAL SWELLING: 0
COUGH: 0
VOMITING: 0
SORE THROAT: 0
NAUSEA: 0
DIARRHEA: 0
SINUS PRESSURE: 0

## 2024-02-01 NOTE — TELEPHONE ENCOUNTER
Submitted PA for Pregabalin 50MG capsules  Via [facility] Key: [id number] STATUS: PENDING.    Follow up done daily; if no decision with in three days we will refax.  If another three days goes by with no decision will call the insurance for status.

## 2024-02-02 ENCOUNTER — PATIENT MESSAGE (OUTPATIENT)
Dept: INTERNAL MEDICINE CLINIC | Age: 79
End: 2024-02-02

## 2024-02-02 DIAGNOSIS — M25.571 CHRONIC PAIN OF RIGHT ANKLE: ICD-10-CM

## 2024-02-02 DIAGNOSIS — G89.29 CHRONIC PAIN OF RIGHT ANKLE: ICD-10-CM

## 2024-02-05 RX ORDER — PREGABALIN 50 MG/1
50 CAPSULE ORAL 3 TIMES DAILY
Qty: 90 CAPSULE | Refills: 0 | OUTPATIENT
Start: 2024-02-05 | End: 2024-03-06

## 2024-02-05 NOTE — TELEPHONE ENCOUNTER
Verify what [facility] pharmacy this patient wants me to send rx to, see his note.

## 2024-02-05 NOTE — TELEPHONE ENCOUNTER
Refill request for  pregabalin  medication.     Name of Pharmacy-  [facility]      Last visit -  1/31/24     Pending visit -  4/1/24    Last refill - 1/31/24      Medication Contract signed - PDMP Monitoring:    Last PDMP Eron as Reviewed:  Review User Review Instant Review Result   PHYLLIS PIEDRA 9/28/2022  9:31 AM Reviewed PDMP [1]     [unfilled]  Urine Drug Screenings (1 yr)    No resulted procedures found.       Medication Contract and Consent for Opioid Use Documents Filed        No documents found                    Last Oarrs ran-          Additional Comments

## 2024-02-07 RX ORDER — PREGABALIN 50 MG/1
50 CAPSULE ORAL 3 TIMES DAILY
Qty: 90 CAPSULE | Refills: 0 | Status: SHIPPED | OUTPATIENT
Start: 2024-02-07 | End: 2024-03-08

## 2024-02-08 NOTE — TELEPHONE ENCOUNTER
Not sure where he had this done, but let's f/u on this MRI on Monday. Does not appear to have been done at [facility].

## 2024-02-08 NOTE — TELEPHONE ENCOUNTER
He went to [facility] in [facility] for MRI of right foot.     He has not scheduled podiatry yet as he is waiting on MRI results.     He will call Yu horton's office after he gets results.     Leaving encounter open to look for results next week.

## 2024-02-21 NOTE — TELEPHONE ENCOUNTER
Refill request for ELIQUIS medication.     Name of Pharmacy- [facility]      Last visit - 1/31/24     Pending visit - 4/1/24    Last refill -11/23/23      Medication Contract signed -   Last Oarrs ran-         Additional Comments

## 2024-04-04 ENCOUNTER — PATIENT MESSAGE (OUTPATIENT)
Dept: INTERNAL MEDICINE CLINIC | Age: 79
End: 2024-04-04

## 2024-04-04 DIAGNOSIS — Z00.00 PREVENTATIVE HEALTH CARE: Primary | ICD-10-CM

## 2024-04-05 SDOH — ECONOMIC STABILITY: FOOD INSECURITY: WITHIN THE PAST 12 MONTHS, THE FOOD YOU BOUGHT JUST DIDN'T LAST AND YOU DIDN'T HAVE MONEY TO GET MORE.: NEVER TRUE

## 2024-04-05 SDOH — ECONOMIC STABILITY: INCOME INSECURITY: HOW HARD IS IT FOR YOU TO PAY FOR THE VERY BASICS LIKE FOOD, HOUSING, MEDICAL CARE, AND HEATING?: NOT VERY HARD

## 2024-04-05 SDOH — ECONOMIC STABILITY: FOOD INSECURITY: WITHIN THE PAST 12 MONTHS, YOU WORRIED THAT YOUR FOOD WOULD RUN OUT BEFORE YOU GOT MONEY TO BUY MORE.: NEVER TRUE

## 2024-04-05 SDOH — ECONOMIC STABILITY: TRANSPORTATION INSECURITY
IN THE PAST 12 MONTHS, HAS LACK OF TRANSPORTATION KEPT YOU FROM MEETINGS, WORK, OR FROM GETTING THINGS NEEDED FOR DAILY LIVING?: NO

## 2024-04-05 SDOH — HEALTH STABILITY: PHYSICAL HEALTH: ON AVERAGE, HOW MANY DAYS PER WEEK DO YOU ENGAGE IN MODERATE TO STRENUOUS EXERCISE (LIKE A BRISK WALK)?: 0 DAYS

## 2024-04-05 SDOH — HEALTH STABILITY: PHYSICAL HEALTH: ON AVERAGE, HOW MANY MINUTES DO YOU ENGAGE IN EXERCISE AT THIS LEVEL?: 10 MIN

## 2024-04-05 ASSESSMENT — PATIENT HEALTH QUESTIONNAIRE - PHQ9
SUM OF ALL RESPONSES TO PHQ QUESTIONS 1-9: 0
2. FEELING DOWN, DEPRESSED OR HOPELESS: NOT AT ALL
1. LITTLE INTEREST OR PLEASURE IN DOING THINGS: NOT AT ALL
SUM OF ALL RESPONSES TO PHQ QUESTIONS 1-9: 0
SUM OF ALL RESPONSES TO PHQ9 QUESTIONS 1 & 2: 0

## 2024-04-05 ASSESSMENT — LIFESTYLE VARIABLES
HOW OFTEN DO YOU HAVE A DRINK CONTAINING ALCOHOL: 1
HOW MANY STANDARD DRINKS CONTAINING ALCOHOL DO YOU HAVE ON A TYPICAL DAY: 0
HOW MANY STANDARD DRINKS CONTAINING ALCOHOL DO YOU HAVE ON A TYPICAL DAY: PATIENT DOES NOT DRINK
HOW OFTEN DO YOU HAVE SIX OR MORE DRINKS ON ONE OCCASION: 1
HOW OFTEN DO YOU HAVE A DRINK CONTAINING ALCOHOL: NEVER

## 2024-04-08 ENCOUNTER — HOSPITAL ENCOUNTER (OUTPATIENT)
Age: 79
Discharge: HOME | End: 2024-04-08
Payer: MEDICARE

## 2024-04-08 ENCOUNTER — TELEPHONE (OUTPATIENT)
Dept: INTERNAL MEDICINE CLINIC | Age: 79
End: 2024-04-08

## 2024-04-08 ENCOUNTER — HOSPITAL ENCOUNTER (OUTPATIENT)
Dept: GENERAL RADIOLOGY | Age: 79
Discharge: HOME | End: 2024-04-08
Payer: MEDICARE

## 2024-04-08 ENCOUNTER — PATIENT MESSAGE (OUTPATIENT)
Dept: INTERNAL MEDICINE CLINIC | Age: 79
End: 2024-04-08

## 2024-04-08 ENCOUNTER — OFFICE VISIT (OUTPATIENT)
Dept: INTERNAL MEDICINE CLINIC | Age: 79
End: 2024-04-08
Payer: MEDICARE

## 2024-04-08 VITALS
SYSTOLIC BLOOD PRESSURE: 136 MMHG | OXYGEN SATURATION: 100 % | WEIGHT: 264 LBS | DIASTOLIC BLOOD PRESSURE: 72 MMHG | HEART RATE: 96 BPM | HEIGHT: 70 IN | BODY MASS INDEX: 37.8 KG/M2 | TEMPERATURE: 97.9 F

## 2024-04-08 DIAGNOSIS — I25.119 ATHEROSCLEROSIS OF NATIVE CORONARY ARTERY OF NATIVE HEART WITH ANGINA PECTORIS (HCC): ICD-10-CM

## 2024-04-08 DIAGNOSIS — Z00.00 MEDICARE ANNUAL WELLNESS VISIT, SUBSEQUENT: Primary | ICD-10-CM

## 2024-04-08 DIAGNOSIS — E66.01 SEVERE OBESITY (BMI 35.0-39.9) WITH COMORBIDITY (HCC): ICD-10-CM

## 2024-04-08 DIAGNOSIS — R73.01 IFG (IMPAIRED FASTING GLUCOSE): ICD-10-CM

## 2024-04-08 DIAGNOSIS — E78.5 DYSLIPIDEMIA: ICD-10-CM

## 2024-04-08 DIAGNOSIS — C43.9 MALIGNANT MELANOMA, UNSPECIFIED SITE (HCC): ICD-10-CM

## 2024-04-08 DIAGNOSIS — R35.1 NOCTURIA: ICD-10-CM

## 2024-04-08 DIAGNOSIS — Z77.098 CHEMICAL EXPOSURE: ICD-10-CM

## 2024-04-08 DIAGNOSIS — M25.571 CHRONIC PAIN OF RIGHT ANKLE: ICD-10-CM

## 2024-04-08 DIAGNOSIS — I10 PRIMARY HYPERTENSION: ICD-10-CM

## 2024-04-08 DIAGNOSIS — N18.31 STAGE 3A CHRONIC KIDNEY DISEASE (HCC): ICD-10-CM

## 2024-04-08 DIAGNOSIS — G89.29 CHRONIC PAIN OF RIGHT ANKLE: ICD-10-CM

## 2024-04-08 LAB
ALBUMIN SERPL-MCNC: 4.5 G/DL (ref 3.4–5)
ALBUMIN/GLOB SERPL: 2 {RATIO} (ref 1.1–2.2)
ALP SERPL-CCNC: 59 U/L (ref 40–129)
ALT SERPL-CCNC: 36 U/L (ref 10–40)
ANION GAP SERPL CALCULATED.3IONS-SCNC: 15 MMOL/L (ref 3–16)
AST SERPL-CCNC: 30 U/L (ref 15–37)
BASOPHILS # BLD: 0.1 K/UL (ref 0–0.2)
BASOPHILS NFR BLD: 0.8 %
BILIRUB SERPL-MCNC: 0.4 MG/DL (ref 0–1)
BUN SERPL-MCNC: 22 MG/DL (ref 7–20)
CALCIUM SERPL-MCNC: 9.2 MG/DL (ref 8.3–10.6)
CHLORIDE SERPL-SCNC: 104 MMOL/L (ref 99–110)
CHOLEST SERPL-MCNC: 148 MG/DL (ref 0–199)
CO2 SERPL-SCNC: 21 MMOL/L (ref 21–32)
CREAT SERPL-MCNC: 1.2 MG/DL (ref 0.8–1.3)
DEPRECATED RDW RBC AUTO: 12.7 % (ref 12.4–15.4)
EOSINOPHIL # BLD: 0.1 K/UL (ref 0–0.6)
EOSINOPHIL NFR BLD: 1.1 %
GFR SERPLBLD CREATININE-BSD FMLA CKD-EPI: 62 ML/MIN/{1.73_M2}
GLUCOSE SERPL-MCNC: 117 MG/DL (ref 70–99)
HCT VFR BLD AUTO: 43 % (ref 40.5–52.5)
HDLC SERPL-MCNC: 31 MG/DL (ref 40–60)
HGB BLD-MCNC: 14.8 G/DL (ref 13.5–17.5)
LDLC SERPL CALC-MCNC: 78 MG/DL
LYMPHOCYTES # BLD: 2.6 K/UL (ref 1–5.1)
LYMPHOCYTES NFR BLD: 37.5 %
MCH RBC QN AUTO: 32.4 PG (ref 26–34)
MCHC RBC AUTO-ENTMCNC: 34.3 G/DL (ref 31–36)
MCV RBC AUTO: 94.3 FL (ref 80–100)
MONOCYTES # BLD: 0.7 K/UL (ref 0–1.3)
MONOCYTES NFR BLD: 10.4 %
NEUTROPHILS # BLD: 3.4 K/UL (ref 1.7–7.7)
NEUTROPHILS NFR BLD: 50.2 %
PLATELET # BLD AUTO: 200 K/UL (ref 135–450)
PMV BLD AUTO: 7.8 FL (ref 5–10.5)
POTASSIUM SERPL-SCNC: 4.5 MMOL/L (ref 3.5–5.1)
PROT SERPL-MCNC: 6.7 G/DL (ref 6.4–8.2)
PSA SERPL DL<=0.01 NG/ML-MCNC: 2.17 NG/ML (ref 0–4)
RBC # BLD AUTO: 4.56 M/UL (ref 4.2–5.9)
SODIUM SERPL-SCNC: 140 MMOL/L (ref 136–145)
TRIGL SERPL-MCNC: 196 MG/DL (ref 0–150)
VLDLC SERPL CALC-MCNC: 39 MG/DL
WBC # BLD AUTO: 6.8 K/UL (ref 4–11)

## 2024-04-08 PROCEDURE — 3078F DIAST BP <80 MM HG: CPT | Performed by: NURSE PRACTITIONER

## 2024-04-08 PROCEDURE — 36415 COLL VENOUS BLD VENIPUNCTURE: CPT

## 2024-04-08 PROCEDURE — 85025 COMPLETE CBC W/AUTO DIFF WBC: CPT

## 2024-04-08 PROCEDURE — 3075F SYST BP GE 130 - 139MM HG: CPT | Performed by: NURSE PRACTITIONER

## 2024-04-08 PROCEDURE — 80053 COMPREHEN METABOLIC PANEL: CPT

## 2024-04-08 PROCEDURE — 84153 ASSAY OF PSA TOTAL: CPT

## 2024-04-08 PROCEDURE — 1123F ACP DISCUSS/DSCN MKR DOCD: CPT | Performed by: NURSE PRACTITIONER

## 2024-04-08 PROCEDURE — 83036 HEMOGLOBIN GLYCOSYLATED A1C: CPT

## 2024-04-08 PROCEDURE — G0439 PPPS, SUBSEQ VISIT: HCPCS | Performed by: NURSE PRACTITIONER

## 2024-04-08 PROCEDURE — 80061 LIPID PANEL: CPT

## 2024-04-08 PROCEDURE — 71046 X-RAY EXAM CHEST 2 VIEWS: CPT

## 2024-04-08 RX ORDER — MELOXICAM 15 MG/1
15 TABLET ORAL DAILY PRN
Qty: 30 TABLET | Refills: 0
Start: 2024-04-08

## 2024-04-08 RX ORDER — PREGABALIN 50 MG/1
50 CAPSULE ORAL 3 TIMES DAILY
Qty: 90 CAPSULE | Refills: 0 | Status: SHIPPED | OUTPATIENT
Start: 2024-04-08 | End: 2024-05-08

## 2024-04-08 RX ORDER — AMLODIPINE BESYLATE 5 MG/1
5 TABLET ORAL DAILY
Qty: 30 TABLET | Refills: 0
Start: 2024-04-08

## 2024-04-08 RX ORDER — LISINOPRIL 40 MG/1
40 TABLET ORAL DAILY
Qty: 90 TABLET | Refills: 1
Start: 2024-04-08

## 2024-04-08 SDOH — ECONOMIC STABILITY: FOOD INSECURITY: WITHIN THE PAST 12 MONTHS, THE FOOD YOU BOUGHT JUST DIDN'T LAST AND YOU DIDN'T HAVE MONEY TO GET MORE.: NEVER TRUE

## 2024-04-08 SDOH — ECONOMIC STABILITY: FOOD INSECURITY: WITHIN THE PAST 12 MONTHS, YOU WORRIED THAT YOUR FOOD WOULD RUN OUT BEFORE YOU GOT MONEY TO BUY MORE.: NEVER TRUE

## 2024-04-08 SDOH — ECONOMIC STABILITY: INCOME INSECURITY: HOW HARD IS IT FOR YOU TO PAY FOR THE VERY BASICS LIKE FOOD, HOUSING, MEDICAL CARE, AND HEATING?: NOT HARD AT ALL

## 2024-04-08 NOTE — PATIENT INSTRUCTIONS
necessary to meet this goal.  When exposed to the sun, use a sunscreen that protects against both UVA and UVB radiation with an SPF of 30 or greater. Reapply every 2 to 3 hours or after sweating, drying off with a towel, or swimming.  Always wear a seat belt when traveling in a car. Always wear a helmet when riding a bicycle or motorcycle.          Update hearing testing, Dr Walker [facility]  Update vision appt with [facility]  Monitor BP closely at home, check once or twice a week and write down log  Perform chest xray at your convenience  Restart Lyrica  New night lights and may need to sleep in other room without animals

## 2024-04-08 NOTE — PROGRESS NOTES
standing for long periods, balance.   Started Lyrica 50mg TID and ran out of medication 2 weeks ago.     Dr Fonseca - 2 inch tear in tendon, surgical intervention vs PRP. Chose PRP and performed. Wearing brace as best he can and performing PT. Another f/u 4/22/24      Performed extensive PT for the back pain - minimal improvement  Gabapentin- side effects.   Acupunture- Went to Dr Hartmann's office. Poor experience, no improvement.  Hx dryneedling.   Tylenol once daily in AM and sometimes in PM.   Tramadol 100mg BID. Minimal improvement - self d/c.      Pt states he is noticing more and more stiffness in hands and feet. Limited . Improves slightly later in the day. Saw Dr Cottrell - dx severe OA hands/ankles/foot.   Walking with cane when long distances.     Dermatology: [facility], no concerns, 2024.   [facility]. Switch from Dr Levine to [facility].      May retest hearing this year with [facility] where he went 2020.     Dr Badillo - [facility], increased Lisinopril     Patient's complete Health Risk Assessment and screening values have been reviewed and are found in Flowsheets. The following problems were reviewed today and where indicated follow up appointments were made and/or referrals ordered.    Positive Risk Factor Screenings with Interventions:               General HRA Questions:  Select all that apply: (!) New or Increased Pain    Pain Interventions:  Will review  with Dr Fonseca and decide best plan      Activity, Diet, and Weight:  On average, how many days per week do you engage in moderate to strenuous exercise (like a brisk walk)?: 0 days  On average, how many minutes do you engage in exercise at this level?: 10 min    Do you eat balanced/healthy meals regularly?: Yes    Body mass index is 37.88 kg/m². (!) Abnormal      Inactivity Interventions:  Enc to work on dietary changes.   Obesity Interventions:  Reviewed diet changes, enc smaller portions at each meal             Hearing Screen:  Do

## 2024-04-08 NOTE — TELEPHONE ENCOUNTER
Patient is calling and states that he was in this morning and got a Handicap Placard and only received one and was needing two.

## 2024-04-09 LAB
EST. AVERAGE GLUCOSE BLD GHB EST-MCNC: 108.3 MG/DL
HBA1C MFR BLD: 5.4 %

## 2024-04-09 NOTE — TELEPHONE ENCOUNTER
Call Dr Yu Fonseca and see if she will call my cell phone to review treatment plan for patient's R ankle pain.

## 2024-04-10 NOTE — TELEPHONE ENCOUNTER
Left voice mail for the MA for Dr. CAMILO Fonseca to call phylicia on her cell phone re: treatment for right ankle pain.

## 2024-04-18 NOTE — TELEPHONE ENCOUNTER
Patient has plan with Dr Fonseca and she recommends complete current treatment on tear and when tear is healed, then start next treatment to continue to work towards ankle pain healing.

## 2024-05-22 DIAGNOSIS — M25.571 CHRONIC PAIN OF RIGHT ANKLE: ICD-10-CM

## 2024-05-22 DIAGNOSIS — G89.29 CHRONIC PAIN OF RIGHT ANKLE: ICD-10-CM

## 2024-05-22 RX ORDER — PREGABALIN 50 MG/1
50 CAPSULE ORAL 3 TIMES DAILY
Qty: 90 CAPSULE | Refills: 0 | Status: SHIPPED | OUTPATIENT
Start: 2024-05-22 | End: 2024-06-21

## 2024-05-22 NOTE — TELEPHONE ENCOUNTER
Refill request for Pregabalin ( Lyrica) 50 mg  medication.     Name of Pharmacy- [facility]      Last visit - 4/8/24     Pending visit - 10/21/24    Last refill -4/8/24      Medication Contract signed -   Last Oarrs ran-         Additional Comments

## 2024-06-28 ENCOUNTER — PATIENT MESSAGE (OUTPATIENT)
Dept: INTERNAL MEDICINE CLINIC | Age: 79
End: 2024-06-28

## 2024-06-28 DIAGNOSIS — G89.29 CHRONIC PAIN OF RIGHT ANKLE: ICD-10-CM

## 2024-06-28 DIAGNOSIS — M25.571 CHRONIC PAIN OF RIGHT ANKLE: ICD-10-CM

## 2024-06-28 NOTE — TELEPHONE ENCOUNTER
From: Karthik Tao  To: John Holt  Sent: 6/28/2024 12:02 PM EDT  Subject: Pregabalin (lirica ?)    New foot Dr that bought Yu Fonseca's practice wants to increase the pregabalin prescription to 300 mg in an effort to treat my right foot issue. And then try a laser treatment not covered by insurance @10 treatments 100.00 each. Any opinions   Thanks

## 2024-07-08 RX ORDER — PREGABALIN 100 MG/1
100 CAPSULE ORAL 3 TIMES DAILY
Qty: 90 CAPSULE | Refills: 0 | Status: SHIPPED | OUTPATIENT
Start: 2024-07-08 | End: 2024-08-07

## 2024-07-19 RX ORDER — ATORVASTATIN CALCIUM 20 MG/1
20 TABLET, FILM COATED ORAL DAILY
Qty: 90 TABLET | Refills: 3 | Status: SHIPPED | OUTPATIENT
Start: 2024-07-19

## 2024-07-19 NOTE — TELEPHONE ENCOUNTER
Refill request for Atorvastatin  medication.     Name of Pharmacy- [facility]      Last visit - 4/8/24     Pending visit - 10/21/24    Last refill -5/3/23      Medication Contract signed -   Last Oarrs ran-         Additional Comments

## 2024-07-24 DIAGNOSIS — E78.5 DYSLIPIDEMIA: ICD-10-CM

## 2024-07-24 RX ORDER — FENOFIBRATE 160 MG/1
160 TABLET ORAL DAILY
Qty: 90 TABLET | Refills: 3 | Status: SHIPPED | OUTPATIENT
Start: 2024-07-24

## 2024-07-24 NOTE — TELEPHONE ENCOUNTER
Refill request for Fenofibrate ( triglide) 160 mg  medication.     Name of Pharmacy- [facility]      Last visit - 4/8/24     Pending visit - 10/21/24    Last refill -5/8/23      Medication Contract signed -   Last Oarrs ran-         Additional Comments

## 2024-08-10 DIAGNOSIS — M25.571 CHRONIC PAIN OF RIGHT ANKLE: ICD-10-CM

## 2024-08-10 DIAGNOSIS — G89.29 CHRONIC PAIN OF RIGHT ANKLE: ICD-10-CM

## 2024-08-12 RX ORDER — PREGABALIN 100 MG/1
100 CAPSULE ORAL 3 TIMES DAILY
Qty: 90 CAPSULE | Refills: 0 | Status: SHIPPED | OUTPATIENT
Start: 2024-08-12 | End: 2024-09-11

## 2024-09-04 ENCOUNTER — PATIENT MESSAGE (OUTPATIENT)
Dept: INTERNAL MEDICINE CLINIC | Age: 79
End: 2024-09-04

## 2024-10-03 ENCOUNTER — TELEPHONE (OUTPATIENT)
Dept: INTERNAL MEDICINE CLINIC | Age: 79
End: 2024-10-03

## 2024-10-03 DIAGNOSIS — G47.33 OSA (OBSTRUCTIVE SLEEP APNEA): Primary | ICD-10-CM

## 2024-10-03 NOTE — TELEPHONE ENCOUNTER
SNAP results show a mild to moderate sleep apnea. I would highly recommend further evaluation and consultation with Isabel Mancini as patient may benefit from CPAP. She would review further with him and discuss best options.

## 2024-10-03 NOTE — TELEPHONE ENCOUNTER
Called and spoke with patient and gave him the message. Patient wants a referral put in to Isabel Mancini.

## 2024-10-20 DIAGNOSIS — G89.29 CHRONIC PAIN OF RIGHT ANKLE: ICD-10-CM

## 2024-10-20 DIAGNOSIS — M25.571 CHRONIC PAIN OF RIGHT ANKLE: ICD-10-CM

## 2024-10-21 ENCOUNTER — OFFICE VISIT (OUTPATIENT)
Dept: INTERNAL MEDICINE CLINIC | Age: 79
End: 2024-10-21
Payer: MEDICARE

## 2024-10-21 ENCOUNTER — TELEPHONE (OUTPATIENT)
Dept: INTERNAL MEDICINE CLINIC | Age: 79
End: 2024-10-21

## 2024-10-21 VITALS
DIASTOLIC BLOOD PRESSURE: 76 MMHG | TEMPERATURE: 98.1 F | WEIGHT: 269 LBS | OXYGEN SATURATION: 94 % | BODY MASS INDEX: 38.6 KG/M2 | SYSTOLIC BLOOD PRESSURE: 138 MMHG | HEART RATE: 81 BPM

## 2024-10-21 DIAGNOSIS — M25.571 CHRONIC PAIN OF RIGHT ANKLE: Primary | ICD-10-CM

## 2024-10-21 DIAGNOSIS — R26.81 UNSTABLE GAIT: ICD-10-CM

## 2024-10-21 DIAGNOSIS — Z23 NEED FOR INFLUENZA VACCINATION: ICD-10-CM

## 2024-10-21 DIAGNOSIS — L29.9 PRURITUS: ICD-10-CM

## 2024-10-21 DIAGNOSIS — G89.29 CHRONIC PAIN OF RIGHT ANKLE: Primary | ICD-10-CM

## 2024-10-21 DIAGNOSIS — G47.33 OSA (OBSTRUCTIVE SLEEP APNEA): ICD-10-CM

## 2024-10-21 DIAGNOSIS — R13.10 DYSPHAGIA, UNSPECIFIED TYPE: ICD-10-CM

## 2024-10-21 PROCEDURE — G8417 CALC BMI ABV UP PARAM F/U: HCPCS | Performed by: NURSE PRACTITIONER

## 2024-10-21 PROCEDURE — G0008 ADMIN INFLUENZA VIRUS VAC: HCPCS | Performed by: NURSE PRACTITIONER

## 2024-10-21 PROCEDURE — 1123F ACP DISCUSS/DSCN MKR DOCD: CPT | Performed by: NURSE PRACTITIONER

## 2024-10-21 PROCEDURE — 3075F SYST BP GE 130 - 139MM HG: CPT | Performed by: NURSE PRACTITIONER

## 2024-10-21 PROCEDURE — G8427 DOCREV CUR MEDS BY ELIG CLIN: HCPCS | Performed by: NURSE PRACTITIONER

## 2024-10-21 PROCEDURE — 99214 OFFICE O/P EST MOD 30 MIN: CPT | Performed by: NURSE PRACTITIONER

## 2024-10-21 PROCEDURE — G8482 FLU IMMUNIZE ORDER/ADMIN: HCPCS | Performed by: NURSE PRACTITIONER

## 2024-10-21 PROCEDURE — 1036F TOBACCO NON-USER: CPT | Performed by: NURSE PRACTITIONER

## 2024-10-21 PROCEDURE — 90653 IIV ADJUVANT VACCINE IM: CPT | Performed by: NURSE PRACTITIONER

## 2024-10-21 PROCEDURE — 3078F DIAST BP <80 MM HG: CPT | Performed by: NURSE PRACTITIONER

## 2024-10-21 RX ORDER — PREGABALIN 50 MG/1
50 CAPSULE ORAL 2 TIMES DAILY
Qty: 60 CAPSULE | Refills: 5 | Status: SHIPPED | OUTPATIENT
Start: 2024-10-21 | End: 2025-04-19

## 2024-10-21 RX ORDER — KETOCONAZOLE 20 MG/G
CREAM TOPICAL DAILY
COMMUNITY

## 2024-10-21 RX ORDER — PREGABALIN 100 MG/1
100 CAPSULE ORAL 3 TIMES DAILY
Qty: 90 CAPSULE | Refills: 0 | OUTPATIENT
Start: 2024-10-21

## 2024-10-21 ASSESSMENT — ENCOUNTER SYMPTOMS
BACK PAIN: 1
VOMITING: 0
DIARRHEA: 0
FACIAL SWELLING: 0
SINUS PRESSURE: 0
NAUSEA: 0
SORE THROAT: 0
COUGH: 0

## 2024-10-21 NOTE — PATIENT INSTRUCTIONS
I will call Dr Barrera and find out best treatment plan  Apply GOOD amount of Aquaphor ointment every other day to every area that is itching  Write a food journal for 14 days and drop at my office to review  Lyrica 50mg twice a day  Meet with Isabel Mancini for sleep apnea review  Take Prilosec OTC once daily x 14 days and see if this improves swallowing. If no change, schedule EGD and Colonoscopy with Dr Man

## 2024-10-21 NOTE — TELEPHONE ENCOUNTER
Spoke with Sayra at Dr. Barrera's office. I provided her with John Rods cell number and she will give it to Dr. Barrera to call for consultation on this patient.

## 2024-10-21 NOTE — PROGRESS NOTES
Karthik Tao  1945        Chief Complaint   Patient presents with    Follow-up     Wants flu shot today, Follow up on lyrica, did home sleep study follow up Monday with Isabel delgado.        Assessment/Plan:     1. Chronic pain of right ankle  Consider [facility] PT at next OV to improve strength and balance. Decrease risk of falls.   Maintain Lyria 50mg BID    - pregabalin (LYRICA) 50 MG capsule; Take 1 capsule by mouth 2 times daily for 180 days. Max Daily Amount: 100 mg  Dispense: 60 capsule; Refill: 5    2. Pruritus  Apply Aquaphor every other day to the skin. Discussed importance of moisturizing.     3. Unstable gait  Consider [facility] referral for PT and stressed importance of daily exercise to prevent deconditioning.     4. ELISHA (obstructive sleep apnea)  Meet with Isabel Delgado to review,     5. Dysphagia, unspecified type  Prilosec daily OTC x 2 weeks, f/u EGD with [facility]    6. Need for influenza vaccination  Pt tolerated well  - Influenza, FLUAD Trivalent, (age 65 y+), IM, Preservative Free, 0.5mL      Return for 4 week virtual/telephone call. AWV 4/2025.      HPI:      Lyrica 50mg BID started 4/2024. Increased to TID and back to BID. Improved foot pain/neuropathy symptoms. He then increased to 100mg TID and BID.   He feels the same on Lyrica 50mg BID and improved in pins and needles     Dr Barrera, recommended laser therapy but pt is confused on the why?       \"Constant, intermittent itch\", top of shoulders, trunk, groin. No rash. Dermatology evaluated itching and advised to use Triamcinolone. No improvement.   No change in soaps/clothes/sheets etc. Wife does have dog rescue and could have exposure to this dog.     ELISHA. Isabel Delgado appt.     Dysphagia. He brought up to GI and they recommend another colonoscopy and potential EGD.       /76   Pulse 81   Temp 98.1 °F (36.7 °C)   Wt 122 kg (269 lb)   SpO2 94%   BMI 38.60 kg/m²     Prior to Visit Medications    Medication Sig Taking?

## 2024-10-21 NOTE — TELEPHONE ENCOUNTER
Refill request for pregabalin (LYRICA) 100 MG capsule ()  medication.     Name of Pharmacy- [facility]      Last visit - 240635     Pending visit - 571271    Last refill -[id number]      Medication Contract signed -  Last Oarrs ran-         Additional Comments

## 2024-10-28 ENCOUNTER — TELEPHONE (OUTPATIENT)
Dept: PULMONOLOGY | Age: 79
End: 2024-10-28

## 2024-10-28 ENCOUNTER — OFFICE VISIT (OUTPATIENT)
Dept: PULMONOLOGY | Age: 79
End: 2024-10-28
Payer: MEDICARE

## 2024-10-28 VITALS
HEART RATE: 86 BPM | TEMPERATURE: 96.9 F | SYSTOLIC BLOOD PRESSURE: 130 MMHG | WEIGHT: 266 LBS | BODY MASS INDEX: 38.17 KG/M2 | OXYGEN SATURATION: 94 % | DIASTOLIC BLOOD PRESSURE: 70 MMHG

## 2024-10-28 DIAGNOSIS — G25.81 RLS (RESTLESS LEGS SYNDROME): ICD-10-CM

## 2024-10-28 DIAGNOSIS — E66.9 OBESITY (BMI 30-39.9): ICD-10-CM

## 2024-10-28 DIAGNOSIS — G47.33 MODERATE OBSTRUCTIVE SLEEP APNEA: Primary | ICD-10-CM

## 2024-10-28 DIAGNOSIS — G47.30 OBSERVED SLEEP APNEA: ICD-10-CM

## 2024-10-28 DIAGNOSIS — R53.83 OTHER FATIGUE: ICD-10-CM

## 2024-10-28 DIAGNOSIS — R06.83 SNORING: ICD-10-CM

## 2024-10-28 DIAGNOSIS — I10 ESSENTIAL HYPERTENSION: ICD-10-CM

## 2024-10-28 PROCEDURE — 99204 OFFICE O/P NEW MOD 45 MIN: CPT | Performed by: NURSE PRACTITIONER

## 2024-10-28 PROCEDURE — G8427 DOCREV CUR MEDS BY ELIG CLIN: HCPCS | Performed by: NURSE PRACTITIONER

## 2024-10-28 PROCEDURE — 3078F DIAST BP <80 MM HG: CPT | Performed by: NURSE PRACTITIONER

## 2024-10-28 PROCEDURE — G8482 FLU IMMUNIZE ORDER/ADMIN: HCPCS | Performed by: NURSE PRACTITIONER

## 2024-10-28 PROCEDURE — 3075F SYST BP GE 130 - 139MM HG: CPT | Performed by: NURSE PRACTITIONER

## 2024-10-28 PROCEDURE — G8417 CALC BMI ABV UP PARAM F/U: HCPCS | Performed by: NURSE PRACTITIONER

## 2024-10-28 ASSESSMENT — SLEEP AND FATIGUE QUESTIONNAIRES
HOW LIKELY ARE YOU TO NOD OFF OR FALL ASLEEP WHILE SITTING AND TALKING TO SOMEONE: WOULD NEVER DOZE
HOW LIKELY ARE YOU TO NOD OFF OR FALL ASLEEP IN A CAR, WHILE STOPPED FOR A FEW MINUTES IN TRAFFIC: WOULD NEVER DOZE
HOW LIKELY ARE YOU TO NOD OFF OR FALL ASLEEP WHILE SITTING QUIETLY AFTER LUNCH WITHOUT ALCOHOL: WOULD NEVER DOZE
HOW LIKELY ARE YOU TO NOD OFF OR FALL ASLEEP WHILE SITTING AND READING: SLIGHT CHANCE OF DOZING
HOW LIKELY ARE YOU TO NOD OFF OR FALL ASLEEP WHILE LYING DOWN TO REST IN THE AFTERNOON WHEN CIRCUMSTANCES PERMIT: SLIGHT CHANCE OF DOZING
HOW LIKELY ARE YOU TO NOD OFF OR FALL ASLEEP WHILE WATCHING TV: WOULD NEVER DOZE
HOW LIKELY ARE YOU TO NOD OFF OR FALL ASLEEP WHILE SITTING INACTIVE IN A PUBLIC PLACE: WOULD NEVER DOZE
ESS TOTAL SCORE: 2
HOW LIKELY ARE YOU TO NOD OFF OR FALL ASLEEP WHEN YOU ARE A PASSENGER IN A CAR FOR AN HOUR WITHOUT A BREAK: WOULD NEVER DOZE

## 2024-10-28 NOTE — TELEPHONE ENCOUNTER
Pt was seen in office today, and would like to contact his insurance first to see who they recommend on a DME supplier He was given a DME supply list. He will contact the office at a later time. He is schedule 31-90 day -1/13/2025

## 2024-10-28 NOTE — PROGRESS NOTES
Patient ID: Karthik Tao is a 79 y.o. male who is being seen today for   Chief Complaint   Patient presents with    Sleep Apnea    New Patient     Referring: VY Hudson    HPI:   Karthik aTo is a 79 y.o. male in office for ELISHA evaluation.  He had HST per PCP.  HST was reviewed by me and noted below.  It showed moderate ELISHA.  Results were dicussed with patient and multiple good questions were answered         Patient reports occasional  snoring at night for the past unknown years. Worse in supine position. Has witnessed apnea. No restorative sleep. Sometimes dry mouth upon awakening. Fatigue and tiredness during the day. No history of sleep apnea. Bedtime 11 pm and rise time is 8-9 am. It takes few minutes to fall asleep. No TV in bedroom. 4-5 nocturia. Wakes up 4-5 times at night. It takes few- 20 minutes to fall back a sleep. Takes no nap during the day.  No headache in am. No car wrecks or near wrecks because of the sleepiness. No nodding off while driving. Gained 20 pounds in the past 1 years. No forgetfulness or decreased concentration. Drinks 1 caffinated beverages per day. No alcohol. Occasional  restless feelings in legs at night. No loss of muscle strength when angry or laugh. No hallucination when dozing off or waking up from sleep. No paralysis upon awakening from sleep or going to sleep. No teeth grinding. Occasional nightmares. No sleep walking. No night time panic attacks. No narcotics. No drug abuse. No history of depression. No history of anxiety. No history of atrial fibrillation. No history of DM. +history of HTN. +history of ischemic heart disease PCI with stent x1 in 2005. No history of stroke. ESS is 2 . No smoking. No FH for ELISHA, RLS or narcolepsy.     Blood pressure is elevated in office today.  Denies CP, vision change or Headache.   States off antihypertensive right now due to itching and potential allergy.  States he is working with PCP for this.         10/28/2024     8:26 AM

## 2024-10-30 ENCOUNTER — PATIENT MESSAGE (OUTPATIENT)
Dept: INTERNAL MEDICINE CLINIC | Age: 79
End: 2024-10-30

## 2024-11-12 DIAGNOSIS — R35.1 BENIGN PROSTATIC HYPERPLASIA WITH NOCTURIA: ICD-10-CM

## 2024-11-12 DIAGNOSIS — N40.1 BENIGN PROSTATIC HYPERPLASIA WITH NOCTURIA: ICD-10-CM

## 2024-11-12 RX ORDER — DOXAZOSIN 8 MG/1
TABLET ORAL
Qty: 90 TABLET | Refills: 3 | Status: SHIPPED | OUTPATIENT
Start: 2024-11-12

## 2024-11-12 NOTE — TELEPHONE ENCOUNTER
Refill request for doxazosin (CARDURA) 8 MG tablet  medication.     Name of Pharmacy- [facility]      Last visit - 288551     Pending visit - [id number]    Last refill -[id number]      Medication Contract signed -  Last Oarrs ran-     PDMP Monitoring:    Last PDMP Eron as Reviewed:  Review User Review Instant Review Result   PHYLLIS PIEDRA 9/28/2022  9:31 AM Reviewed PDMP [1]     [unfilled]  Urine Drug Screenings (1 yr)    No resulted procedures found.       Medication Contract and Consent for Opioid Use Documents Filed        No documents found                       Additional Comments

## 2024-11-20 ENCOUNTER — OFFICE VISIT (OUTPATIENT)
Dept: INTERNAL MEDICINE CLINIC | Age: 79
End: 2024-11-20
Payer: MEDICARE

## 2024-11-20 VITALS
WEIGHT: 263 LBS | HEART RATE: 87 BPM | DIASTOLIC BLOOD PRESSURE: 74 MMHG | SYSTOLIC BLOOD PRESSURE: 132 MMHG | OXYGEN SATURATION: 95 % | HEIGHT: 70 IN | TEMPERATURE: 97.9 F | BODY MASS INDEX: 37.65 KG/M2

## 2024-11-20 DIAGNOSIS — I10 PRIMARY HYPERTENSION: ICD-10-CM

## 2024-11-20 DIAGNOSIS — G62.9 NEUROPATHY: ICD-10-CM

## 2024-11-20 DIAGNOSIS — G47.33 OSA (OBSTRUCTIVE SLEEP APNEA): ICD-10-CM

## 2024-11-20 DIAGNOSIS — L29.9 PRURITUS: Primary | ICD-10-CM

## 2024-11-20 DIAGNOSIS — R60.0 LEG EDEMA: ICD-10-CM

## 2024-11-20 PROCEDURE — 3078F DIAST BP <80 MM HG: CPT | Performed by: NURSE PRACTITIONER

## 2024-11-20 PROCEDURE — G8427 DOCREV CUR MEDS BY ELIG CLIN: HCPCS | Performed by: NURSE PRACTITIONER

## 2024-11-20 PROCEDURE — 1123F ACP DISCUSS/DSCN MKR DOCD: CPT | Performed by: NURSE PRACTITIONER

## 2024-11-20 PROCEDURE — 99214 OFFICE O/P EST MOD 30 MIN: CPT | Performed by: NURSE PRACTITIONER

## 2024-11-20 PROCEDURE — 3075F SYST BP GE 130 - 139MM HG: CPT | Performed by: NURSE PRACTITIONER

## 2024-11-20 PROCEDURE — G8417 CALC BMI ABV UP PARAM F/U: HCPCS | Performed by: NURSE PRACTITIONER

## 2024-11-20 PROCEDURE — G8482 FLU IMMUNIZE ORDER/ADMIN: HCPCS | Performed by: NURSE PRACTITIONER

## 2024-11-20 PROCEDURE — 1159F MED LIST DOCD IN RCRD: CPT | Performed by: NURSE PRACTITIONER

## 2024-11-20 PROCEDURE — 1036F TOBACCO NON-USER: CPT | Performed by: NURSE PRACTITIONER

## 2024-11-20 NOTE — PATIENT INSTRUCTIONS
Get a new BP cuff and keep log 3-5 times a week check BP.     Compound at [facility] for [facility]. ([place])    Stay on top of getting CPAP supplies.     Meet with Dr Shook

## 2024-11-20 NOTE — PROGRESS NOTES
About Running Out of Food in the Last Year: Never true     Ran Out of Food in the Last Year: Never true   Transportation Needs: Unknown (4/8/2024)    PRAPARE - Transportation     Lack of Transportation (Medical): Not on file     Lack of Transportation (Non-Medical): No   Physical Activity: Inactive (4/5/2024)    Exercise Vital Sign     Days of Exercise per Week: 0 days     Minutes of Exercise per Session: 10 min   Stress: Not on file   Social Connections: Not on file   Intimate Partner Violence: Unknown (1/21/2024)    [facility]    Interpersonal Safety     Feel physically or emotionally unsafe where currently live: Not on file     Harm by anyone: Not on file     Emotionally Harmed: Not on file   Housing Stability: Unknown (4/8/2024)    Housing Stability Vital Sign     Unable to Pay for Housing in the Last Year: Not on file     Number of Places Lived in the Last Year: Not on file     Unstable Housing in the Last Year: No       Review of Systems   Constitutional:  Negative for appetite change, chills, fatigue, fever and unexpected weight change.   HENT:  Negative for congestion, ear discharge, ear pain, facial swelling, hearing loss, sinus pressure, sneezing and sore throat.    Respiratory:  Negative for cough.    Cardiovascular:  Negative for chest pain.   Gastrointestinal:  Negative for diarrhea, nausea and vomiting.   Genitourinary:  Negative for difficulty urinating, dysuria, hematuria and urgency.   Musculoskeletal:  Positive for arthralgias. Negative for gait problem.   Skin:         Itchy trunk, scaly dry skin but otherwise no rash   Neurological:  Positive for numbness. Negative for dizziness, weakness and headaches.   Hematological:  Negative for adenopathy.   Psychiatric/Behavioral:  Negative for sleep disturbance and suicidal ideas.        Physical Exam  Vitals reviewed.   Constitutional:       Appearance: He is obese.

## 2024-11-25 NOTE — TELEPHONE ENCOUNTER
Refill request for   apixaban (ELIQUIS) 5 MG TABS tablet    medication.     Name of Pharmacy- [facility]      Last visit - 11/20/24     Pending visit - 4/9/25    Last refill -2/21/245      Medication Contract signed -   Last Oarrs ran-         Additional Comments

## 2024-11-26 ASSESSMENT — ENCOUNTER SYMPTOMS
COUGH: 0
VOMITING: 0
NAUSEA: 0
FACIAL SWELLING: 0
SINUS PRESSURE: 0
SORE THROAT: 0
DIARRHEA: 0

## 2024-11-29 ENCOUNTER — TELEPHONE (OUTPATIENT)
Dept: PULMONOLOGY | Age: 79
End: 2024-11-29

## 2024-11-29 NOTE — TELEPHONE ENCOUNTER
Patient called in asking about his pressures on cpap machine. Called [employer], they will see if this can be handled remotely. Informed patient, he verbalized understanding

## 2024-12-16 RX ORDER — METRONIDAZOLE 7.5 MG/G
GEL TOPICAL
Qty: 45 G | Refills: 0 | Status: SHIPPED | OUTPATIENT
Start: 2024-12-16

## 2024-12-16 NOTE — TELEPHONE ENCOUNTER
Refill request for metroNIDAZOLE (METROGEL) 0.75 % gel  medication.     Name of Pharmacy- [facility]      Last visit - 11/20/24     Pending visit - 4/9/25    Last refill -12/28/23      Medication Contract signed -   Last Oarrs ran-         Additional Comments

## 2024-12-26 ENCOUNTER — HOSPITAL ENCOUNTER (OUTPATIENT)
Age: 79
Discharge: HOME | End: 2024-12-26
Payer: MEDICARE

## 2024-12-26 LAB
ALBUMIN SERPL-MCNC: 4.3 G/DL (ref 3.4–5)
ALBUMIN/GLOB SERPL: 2 {RATIO} (ref 1.1–2.2)
ALP SERPL-CCNC: 56 U/L (ref 40–129)
ALT SERPL-CCNC: 41 U/L (ref 10–40)
ANION GAP SERPL CALCULATED.3IONS-SCNC: 11 MMOL/L (ref 3–16)
AST SERPL-CCNC: 29 U/L (ref 15–37)
BASOPHILS # BLD: 0.1 K/UL (ref 0–0.2)
BASOPHILS NFR BLD: 1.1 %
BILIRUB SERPL-MCNC: 0.4 MG/DL (ref 0–1)
BUN SERPL-MCNC: 25 MG/DL (ref 7–20)
CALCIUM SERPL-MCNC: 9.2 MG/DL (ref 8.3–10.6)
CHLORIDE SERPL-SCNC: 106 MMOL/L (ref 99–110)
CO2 SERPL-SCNC: 24 MMOL/L (ref 21–32)
CREAT SERPL-MCNC: 1.2 MG/DL (ref 0.8–1.3)
DEPRECATED RDW RBC AUTO: 13.1 % (ref 12.4–15.4)
EOSINOPHIL # BLD: 0.4 K/UL (ref 0–0.6)
EOSINOPHIL NFR BLD: 5.6 %
ERYTHROCYTE [SEDIMENTATION RATE] IN BLOOD BY WESTERGREN METHOD: 6 MM/HR (ref 0–20)
EST. AVERAGE GLUCOSE BLD GHB EST-MCNC: 114 MG/DL
FOLATE SERPL-MCNC: 7.94 NG/ML (ref 4.78–24.2)
GFR SERPLBLD CREATININE-BSD FMLA CKD-EPI: 62 ML/MIN/{1.73_M2}
GLUCOSE SERPL-MCNC: 110 MG/DL (ref 70–99)
HBA1C MFR BLD: 5.6 %
HCT VFR BLD AUTO: 42.2 % (ref 40.5–52.5)
HGB BLD-MCNC: 14.4 G/DL (ref 13.5–17.5)
LYMPHOCYTES # BLD: 2.2 K/UL (ref 1–5.1)
LYMPHOCYTES NFR BLD: 31.3 %
MCH RBC QN AUTO: 33.1 PG (ref 26–34)
MCHC RBC AUTO-ENTMCNC: 34.1 G/DL (ref 31–36)
MCV RBC AUTO: 96.9 FL (ref 80–100)
MONOCYTES # BLD: 0.8 K/UL (ref 0–1.3)
MONOCYTES NFR BLD: 10.9 %
NEUTROPHILS # BLD: 3.6 K/UL (ref 1.7–7.7)
NEUTROPHILS NFR BLD: 51.1 %
PLATELET # BLD AUTO: 199 K/UL (ref 135–450)
PMV BLD AUTO: 8 FL (ref 5–10.5)
POTASSIUM SERPL-SCNC: 4.5 MMOL/L (ref 3.5–5.1)
PROT SERPL-MCNC: 6.5 G/DL (ref 6.4–8.2)
RBC # BLD AUTO: 4.36 M/UL (ref 4.2–5.9)
SODIUM SERPL-SCNC: 141 MMOL/L (ref 136–145)
TSH SERPL DL<=0.005 MIU/L-ACNC: 1.82 UIU/ML (ref 0.27–4.2)
VIT B12 SERPL-MCNC: 515 PG/ML (ref 211–911)
WBC # BLD AUTO: 7 K/UL (ref 4–11)

## 2024-12-26 PROCEDURE — 86038 ANTINUCLEAR ANTIBODIES: CPT

## 2024-12-26 PROCEDURE — 82607 VITAMIN B-12: CPT

## 2024-12-26 PROCEDURE — 80053 COMPREHEN METABOLIC PANEL: CPT

## 2024-12-26 PROCEDURE — 84630 ASSAY OF ZINC: CPT

## 2024-12-26 PROCEDURE — 82746 ASSAY OF FOLIC ACID SERUM: CPT

## 2024-12-26 PROCEDURE — 85652 RBC SED RATE AUTOMATED: CPT

## 2024-12-26 PROCEDURE — 82175 ASSAY OF ARSENIC: CPT

## 2024-12-26 PROCEDURE — 83655 ASSAY OF LEAD: CPT

## 2024-12-26 PROCEDURE — 84165 PROTEIN E-PHORESIS SERUM: CPT

## 2024-12-26 PROCEDURE — 84443 ASSAY THYROID STIM HORMONE: CPT

## 2024-12-26 PROCEDURE — 36415 COLL VENOUS BLD VENIPUNCTURE: CPT

## 2024-12-26 PROCEDURE — 83036 HEMOGLOBIN GLYCOSYLATED A1C: CPT

## 2024-12-26 PROCEDURE — 82525 ASSAY OF COPPER: CPT

## 2024-12-26 PROCEDURE — 83825 ASSAY OF MERCURY: CPT

## 2024-12-26 PROCEDURE — 85025 COMPLETE CBC W/AUTO DIFF WBC: CPT

## 2024-12-26 PROCEDURE — 84155 ASSAY OF PROTEIN SERUM: CPT

## 2024-12-27 LAB
ALBUMIN SERPL ELPH-MCNC: 3.4 G/DL (ref 3.1–4.9)
ALPHA1 GLOB SERPL ELPH-MCNC: 0.3 G/DL (ref 0.2–0.4)
ALPHA2 GLOB SERPL ELPH-MCNC: 0.9 G/DL (ref 0.4–1.1)
ANA SER QL IA: NEGATIVE
B-GLOBULIN SERPL ELPH-MCNC: 1.1 G/DL (ref 0.9–1.6)
GAMMA GLOB SERPL ELPH-MCNC: 0.8 G/DL (ref 0.6–1.8)
SPE/IFE INTERPRETATION: NORMAL

## 2024-12-28 LAB
ARSENIC BLD-MCNC: <10 UG/L
COPPER SERPL-MCNC: 83.5 UG/DL (ref 70–140)
LEAD BLD-MCNC: <2 UG/DL
MERCURY BLD-MCNC: 4.5 UG/L
ZINC SERPL-MCNC: 79 UG/DL (ref 60–120)

## 2025-01-13 ENCOUNTER — TELEPHONE (OUTPATIENT)
Dept: PULMONOLOGY | Age: 80
End: 2025-01-13

## 2025-01-13 ENCOUNTER — OFFICE VISIT (OUTPATIENT)
Dept: PULMONOLOGY | Age: 80
End: 2025-01-13
Payer: MEDICARE

## 2025-01-13 VITALS
WEIGHT: 269.2 LBS | BODY MASS INDEX: 38.54 KG/M2 | HEART RATE: 99 BPM | RESPIRATION RATE: 16 BRPM | HEIGHT: 70 IN | OXYGEN SATURATION: 96 % | DIASTOLIC BLOOD PRESSURE: 86 MMHG | SYSTOLIC BLOOD PRESSURE: 136 MMHG

## 2025-01-13 DIAGNOSIS — E66.9 OBESITY (BMI 30-39.9): ICD-10-CM

## 2025-01-13 DIAGNOSIS — I10 ESSENTIAL HYPERTENSION: ICD-10-CM

## 2025-01-13 DIAGNOSIS — Z71.89 CPAP USE COUNSELING: ICD-10-CM

## 2025-01-13 DIAGNOSIS — G47.33 MODERATE OBSTRUCTIVE SLEEP APNEA: Primary | ICD-10-CM

## 2025-01-13 DIAGNOSIS — Z78.9 DIFFICULTY USING CONTINUOUS POSITIVE AIRWAY PRESSURE (CPAP) DEVICE: ICD-10-CM

## 2025-01-13 PROCEDURE — 3079F DIAST BP 80-89 MM HG: CPT | Performed by: NURSE PRACTITIONER

## 2025-01-13 PROCEDURE — 99214 OFFICE O/P EST MOD 30 MIN: CPT | Performed by: NURSE PRACTITIONER

## 2025-01-13 PROCEDURE — 1123F ACP DISCUSS/DSCN MKR DOCD: CPT | Performed by: NURSE PRACTITIONER

## 2025-01-13 PROCEDURE — 1036F TOBACCO NON-USER: CPT | Performed by: NURSE PRACTITIONER

## 2025-01-13 PROCEDURE — G8417 CALC BMI ABV UP PARAM F/U: HCPCS | Performed by: NURSE PRACTITIONER

## 2025-01-13 PROCEDURE — 1160F RVW MEDS BY RX/DR IN RCRD: CPT | Performed by: NURSE PRACTITIONER

## 2025-01-13 PROCEDURE — 1159F MED LIST DOCD IN RCRD: CPT | Performed by: NURSE PRACTITIONER

## 2025-01-13 PROCEDURE — 3075F SYST BP GE 130 - 139MM HG: CPT | Performed by: NURSE PRACTITIONER

## 2025-01-13 PROCEDURE — G8427 DOCREV CUR MEDS BY ELIG CLIN: HCPCS | Performed by: NURSE PRACTITIONER

## 2025-01-13 RX ORDER — NORTRIPTYLINE HYDROCHLORIDE 10 MG/1
CAPSULE ORAL
COMMUNITY
Start: 2024-12-26

## 2025-01-13 ASSESSMENT — SLEEP AND FATIGUE QUESTIONNAIRES
HOW LIKELY ARE YOU TO NOD OFF OR FALL ASLEEP WHILE LYING DOWN TO REST IN THE AFTERNOON WHEN CIRCUMSTANCES PERMIT: SLIGHT CHANCE OF DOZING
HOW LIKELY ARE YOU TO NOD OFF OR FALL ASLEEP IN A CAR, WHILE STOPPED FOR A FEW MINUTES IN TRAFFIC: WOULD NEVER DOZE
HOW LIKELY ARE YOU TO NOD OFF OR FALL ASLEEP WHILE SITTING INACTIVE IN A PUBLIC PLACE: WOULD NEVER DOZE
HOW LIKELY ARE YOU TO NOD OFF OR FALL ASLEEP WHEN YOU ARE A PASSENGER IN A CAR FOR AN HOUR WITHOUT A BREAK: SLIGHT CHANCE OF DOZING
ESS TOTAL SCORE: 2
HOW LIKELY ARE YOU TO NOD OFF OR FALL ASLEEP WHILE SITTING QUIETLY AFTER LUNCH WITHOUT ALCOHOL: WOULD NEVER DOZE
HOW LIKELY ARE YOU TO NOD OFF OR FALL ASLEEP WHILE WATCHING TV: WOULD NEVER DOZE
HOW LIKELY ARE YOU TO NOD OFF OR FALL ASLEEP WHILE SITTING AND TALKING TO SOMEONE: WOULD NEVER DOZE
HOW LIKELY ARE YOU TO NOD OFF OR FALL ASLEEP WHILE SITTING AND READING: WOULD NEVER DOZE

## 2025-01-13 NOTE — PROGRESS NOTES
topically daily Apply topically daily under arms yeast infection., Disp: , Rfl:     pregabalin (LYRICA) 50 MG capsule, Take 1 capsule by mouth 2 times daily for 180 days. Max Daily Amount: 100 mg, Disp: 60 capsule, Rfl: 5    fenofibrate (TRIGLIDE) 160 MG tablet, TAKE 1 TABLET BY MOUTH EVERY DAY, Disp: 90 tablet, Rfl: 3    atorvastatin (LIPITOR) 20 MG tablet, Take 1 tablet by mouth daily, Disp: 90 tablet, Rfl: 3    lisinopril (PRINIVIL;ZESTRIL) 40 MG tablet, Take 1 tablet by mouth daily, Disp: 90 tablet, Rfl: 1    amLODIPine (NORVASC) 5 MG tablet, Take 1 tablet by mouth daily, Disp: 30 tablet, Rfl: 0    Handicap Placard MISC, by Does not apply route Exp: 4/8/2029, Disp: 2 each, Rfl: 0    ascorbic acid (VITAMIN C) 500 MG tablet, Take 1 tablet by mouth daily, Disp: , Rfl:       REVIEW OF SYSTEMS:  Review of Systems      Objective:   PHYSICAL EXAM:  /86 (Site: Right Upper Arm, Position: Sitting, Cuff Size: Large Adult)   Pulse 99   Resp 16   Ht 1.778 m (5' 10\")   Wt 122.1 kg (269 lb 3.2 oz)   SpO2 96%   BMI 38.63 kg/m²     Physical Exam  Gen: No acute distress. Obese. BMI of  38.63  Eyes: PERRL. No sclera icterus. No conjunctival injection.   ENT: No discharge.   Neck: Trachea midline. No obvious mass. Neck circumference 18\"  Resp: No accessory muscle use. Nocrackles. No wheezes. No rhonchi.  CV: Regular rate. Regular rhythm. No murmur or rub.   Skin: Warm and dry.   M/S: No cyanosis. No obvious joint deformity.   Neuro: Awake. Alert. Moves all four extremities.   Psych: Oriented x 3. No anxiety.       DATA:   4/8/2024 Hgb 14.8  9/15/2024 HST RDI 29.2, low SpO2 79%    CPAP download data:  Compliance download report from 12/14/24 to 1/12/25 reviewed today by me and showed patient is using machine 5:26 hrs/night with 57% compliance and AHI 0.5 within this time frame.  17/30days with greater than 4 hours of machine use.  90% pressure 7.1 cm H20 on auto CPAP 4-16 cm H2O     Assessment:       Moderate ELISHA.  Auto  11-Jun-2017

## 2025-02-26 ENCOUNTER — TELEMEDICINE (OUTPATIENT)
Dept: SLEEP MEDICINE | Age: 80
End: 2025-02-26
Payer: MEDICARE

## 2025-02-26 DIAGNOSIS — I10 ESSENTIAL HYPERTENSION: ICD-10-CM

## 2025-02-26 DIAGNOSIS — Z71.89 CPAP USE COUNSELING: ICD-10-CM

## 2025-02-26 DIAGNOSIS — G47.33 MODERATE OBSTRUCTIVE SLEEP APNEA: Primary | ICD-10-CM

## 2025-02-26 DIAGNOSIS — E66.9 OBESITY (BMI 30-39.9): ICD-10-CM

## 2025-02-26 PROCEDURE — 1123F ACP DISCUSS/DSCN MKR DOCD: CPT | Performed by: NURSE PRACTITIONER

## 2025-02-26 PROCEDURE — 1160F RVW MEDS BY RX/DR IN RCRD: CPT | Performed by: NURSE PRACTITIONER

## 2025-02-26 PROCEDURE — 1159F MED LIST DOCD IN RCRD: CPT | Performed by: NURSE PRACTITIONER

## 2025-02-26 PROCEDURE — G8427 DOCREV CUR MEDS BY ELIG CLIN: HCPCS | Performed by: NURSE PRACTITIONER

## 2025-02-26 PROCEDURE — 99214 OFFICE O/P EST MOD 30 MIN: CPT | Performed by: NURSE PRACTITIONER

## 2025-02-26 ASSESSMENT — SLEEP AND FATIGUE QUESTIONNAIRES
HOW LIKELY ARE YOU TO NOD OFF OR FALL ASLEEP WHILE SITTING INACTIVE IN A PUBLIC PLACE: WOULD NEVER DOZE
HOW LIKELY ARE YOU TO NOD OFF OR FALL ASLEEP IN A CAR, WHILE STOPPED FOR A FEW MINUTES IN TRAFFIC: WOULD NEVER DOZE
HOW LIKELY ARE YOU TO NOD OFF OR FALL ASLEEP WHILE SITTING AND TALKING TO SOMEONE: WOULD NEVER DOZE
HOW LIKELY ARE YOU TO NOD OFF OR FALL ASLEEP WHILE LYING DOWN TO REST IN THE AFTERNOON WHEN CIRCUMSTANCES PERMIT: SLIGHT CHANCE OF DOZING
ESS TOTAL SCORE: 2
HOW LIKELY ARE YOU TO NOD OFF OR FALL ASLEEP WHILE WATCHING TV: WOULD NEVER DOZE
HOW LIKELY ARE YOU TO NOD OFF OR FALL ASLEEP WHILE SITTING AND READING: WOULD NEVER DOZE
HOW LIKELY ARE YOU TO NOD OFF OR FALL ASLEEP WHEN YOU ARE A PASSENGER IN A CAR FOR AN HOUR WITHOUT A BREAK: SLIGHT CHANCE OF DOZING
HOW LIKELY ARE YOU TO NOD OFF OR FALL ASLEEP WHILE SITTING QUIETLY AFTER LUNCH WITHOUT ALCOHOL: WOULD NEVER DOZE

## 2025-02-26 NOTE — PROGRESS NOTES
Patient ID: Karthik Tao is a 80 y.o. male who is being seen today for   Chief Complaint   Patient presents with    Follow-up    Sleep Apnea     Referring: ADIN Hudson-KEENA    HPI:     Karthik Tao is a 80 y.o. male for televideo appointment via video and audio virtual visit for ELISHA follow up.  States he is doing better with CPAP.  States was sick last week and could not use. Patient is using CPAP   6 hrs/night. Using humidifier. No snoring on CPAP. The pressure is well tolerated. The mask is comfortable- partial full face. Sometimes mask leak. No significant daytime sleepiness. No nodding off when driving. No dry nose or throat. No fatigue. Bedtime is 11 pm- MN and rise time is 7 am. Sleep onset is usually few-15 minutes. Wakes up 3-5 times at night total. 3-5 nocturia. It takes usually few minutes to fall back a sleep. No naps during the day. No headache in am. No weight gain. 0-1 caffienated beverages during the day. No alcohol. ESS is 2          Previous HPI 1/13/25  Karthik Tao is a 80 y.o. male in office for ELISHA follow up.  He started auto CPAP after last visit. States he is not happy with the DME.  States also at times wakes and feels nose is clogged or mask leak.      Patient is using CPAP   5-7 hrs/night. Using humidifier. No snoring on CPAP. The pressure can feel high. The mask is comfortable-partial full face. Some mask leak. No significant daytime sleepiness. No nodding off when driving. No dry nose or throat. No fatigue. Bedtime is 11 pm- MN and rise time is 7-8 am. Sleep onset is usually few minutes. Wakes up 4-5 times at night total. 3-4 nocturia. It takes 20 minutes to fall back a sleep. No naps during the day. No headache in am. No weight gain. 1 caffienated beverages during the day. No alcohol. ESS is 2        Initial HPI  10/28/24  Karthik Tao is a 80 y.o. male in office for ELISHA evaluation.  He had HST per PCP.  HST was reviewed by me and noted below.  It showed moderate ELISHA.  Results

## 2025-03-11 ENCOUNTER — TELEPHONE (OUTPATIENT)
Dept: INTERNAL MEDICINE CLINIC | Age: 80
End: 2025-03-11

## 2025-03-11 DIAGNOSIS — E78.5 DYSLIPIDEMIA: Primary | ICD-10-CM

## 2025-03-11 DIAGNOSIS — R35.1 NOCTURIA: ICD-10-CM

## 2025-03-24 ENCOUNTER — HOSPITAL ENCOUNTER (OUTPATIENT)
Age: 80
Discharge: HOME | End: 2025-03-24
Payer: MEDICARE

## 2025-03-24 DIAGNOSIS — E78.5 DYSLIPIDEMIA: ICD-10-CM

## 2025-03-24 DIAGNOSIS — R35.1 NOCTURIA: ICD-10-CM

## 2025-03-24 LAB
ALBUMIN SERPL-MCNC: 4.3 G/DL (ref 3.4–5)
ALBUMIN/GLOB SERPL: 1.7 {RATIO} (ref 1.1–2.2)
ALP SERPL-CCNC: 53 U/L (ref 40–129)
ALT SERPL-CCNC: 43 U/L (ref 10–40)
ANION GAP SERPL CALCULATED.3IONS-SCNC: 13 MMOL/L (ref 3–16)
AST SERPL-CCNC: 28 U/L (ref 15–37)
BILIRUB SERPL-MCNC: 0.5 MG/DL (ref 0–1)
BUN SERPL-MCNC: 22 MG/DL (ref 7–20)
CALCIUM SERPL-MCNC: 9.2 MG/DL (ref 8.3–10.6)
CHLORIDE SERPL-SCNC: 103 MMOL/L (ref 99–110)
CHOLEST SERPL-MCNC: 147 MG/DL (ref 0–199)
CO2 SERPL-SCNC: 23 MMOL/L (ref 21–32)
CREAT SERPL-MCNC: 1.3 MG/DL (ref 0.8–1.3)
GFR SERPLBLD CREATININE-BSD FMLA CKD-EPI: 56 ML/MIN/{1.73_M2}
GLUCOSE SERPL-MCNC: 145 MG/DL (ref 70–99)
HDLC SERPL-MCNC: 28 MG/DL (ref 40–60)
LDLC SERPL CALC-MCNC: 82 MG/DL
POTASSIUM SERPL-SCNC: 4.4 MMOL/L (ref 3.5–5.1)
PROT SERPL-MCNC: 6.8 G/DL (ref 6.4–8.2)
PSA SERPL DL<=0.01 NG/ML-MCNC: 1.8 NG/ML (ref 0–4)
SODIUM SERPL-SCNC: 139 MMOL/L (ref 136–145)
TRIGL SERPL-MCNC: 185 MG/DL (ref 0–150)
VLDLC SERPL CALC-MCNC: 37 MG/DL

## 2025-03-24 PROCEDURE — 36415 COLL VENOUS BLD VENIPUNCTURE: CPT

## 2025-03-24 PROCEDURE — 80061 LIPID PANEL: CPT

## 2025-03-24 PROCEDURE — 80053 COMPREHEN METABOLIC PANEL: CPT

## 2025-03-24 PROCEDURE — 84153 ASSAY OF PSA TOTAL: CPT

## 2025-03-26 ENCOUNTER — RESULTS FOLLOW-UP (OUTPATIENT)
Dept: INTERNAL MEDICINE CLINIC | Age: 80
End: 2025-03-26

## 2025-04-06 SDOH — HEALTH STABILITY: PHYSICAL HEALTH: ON AVERAGE, HOW MANY DAYS PER WEEK DO YOU ENGAGE IN MODERATE TO STRENUOUS EXERCISE (LIKE A BRISK WALK)?: 1 DAY

## 2025-04-06 SDOH — HEALTH STABILITY: PHYSICAL HEALTH: ON AVERAGE, HOW MANY MINUTES DO YOU ENGAGE IN EXERCISE AT THIS LEVEL?: 30 MIN

## 2025-04-06 ASSESSMENT — PATIENT HEALTH QUESTIONNAIRE - PHQ9
SUM OF ALL RESPONSES TO PHQ QUESTIONS 1-9: 0
1. LITTLE INTEREST OR PLEASURE IN DOING THINGS: NOT AT ALL
SUM OF ALL RESPONSES TO PHQ QUESTIONS 1-9: 0
2. FEELING DOWN, DEPRESSED OR HOPELESS: NOT AT ALL

## 2025-04-06 ASSESSMENT — LIFESTYLE VARIABLES
HOW OFTEN DO YOU HAVE A DRINK CONTAINING ALCOHOL: 1
HOW OFTEN DO YOU HAVE SIX OR MORE DRINKS ON ONE OCCASION: 1
HOW OFTEN DO YOU HAVE A DRINK CONTAINING ALCOHOL: NEVER
HOW MANY STANDARD DRINKS CONTAINING ALCOHOL DO YOU HAVE ON A TYPICAL DAY: PATIENT DOES NOT DRINK
HOW MANY STANDARD DRINKS CONTAINING ALCOHOL DO YOU HAVE ON A TYPICAL DAY: 0

## 2025-04-08 SDOH — ECONOMIC STABILITY: FOOD INSECURITY: WITHIN THE PAST 12 MONTHS, YOU WORRIED THAT YOUR FOOD WOULD RUN OUT BEFORE YOU GOT MONEY TO BUY MORE.: NEVER TRUE

## 2025-04-08 SDOH — ECONOMIC STABILITY: FOOD INSECURITY: WITHIN THE PAST 12 MONTHS, THE FOOD YOU BOUGHT JUST DIDN'T LAST AND YOU DIDN'T HAVE MONEY TO GET MORE.: NEVER TRUE

## 2025-04-08 SDOH — ECONOMIC STABILITY: INCOME INSECURITY: IN THE LAST 12 MONTHS, WAS THERE A TIME WHEN YOU WERE NOT ABLE TO PAY THE MORTGAGE OR RENT ON TIME?: NO

## 2025-04-08 SDOH — ECONOMIC STABILITY: TRANSPORTATION INSECURITY
IN THE PAST 12 MONTHS, HAS THE LACK OF TRANSPORTATION KEPT YOU FROM MEDICAL APPOINTMENTS OR FROM GETTING MEDICATIONS?: NO

## 2025-04-09 ENCOUNTER — OFFICE VISIT (OUTPATIENT)
Dept: INTERNAL MEDICINE CLINIC | Age: 80
End: 2025-04-09
Payer: MEDICARE

## 2025-04-09 VITALS
SYSTOLIC BLOOD PRESSURE: 136 MMHG | HEART RATE: 88 BPM | HEIGHT: 70 IN | DIASTOLIC BLOOD PRESSURE: 86 MMHG | WEIGHT: 258 LBS | OXYGEN SATURATION: 95 % | BODY MASS INDEX: 36.94 KG/M2

## 2025-04-09 DIAGNOSIS — L29.9 PRURITUS: ICD-10-CM

## 2025-04-09 DIAGNOSIS — M25.571 CHRONIC PAIN OF RIGHT ANKLE: ICD-10-CM

## 2025-04-09 DIAGNOSIS — I10 PRIMARY HYPERTENSION: ICD-10-CM

## 2025-04-09 DIAGNOSIS — N18.31 STAGE 3A CHRONIC KIDNEY DISEASE (HCC): ICD-10-CM

## 2025-04-09 DIAGNOSIS — G62.9 NEUROPATHY: ICD-10-CM

## 2025-04-09 DIAGNOSIS — R73.01 IFG (IMPAIRED FASTING GLUCOSE): ICD-10-CM

## 2025-04-09 DIAGNOSIS — G89.29 CHRONIC PAIN OF RIGHT ANKLE: ICD-10-CM

## 2025-04-09 DIAGNOSIS — G47.33 OSA (OBSTRUCTIVE SLEEP APNEA): ICD-10-CM

## 2025-04-09 DIAGNOSIS — Z00.00 MEDICARE ANNUAL WELLNESS VISIT, SUBSEQUENT: Primary | ICD-10-CM

## 2025-04-09 DIAGNOSIS — C43.9 MALIGNANT MELANOMA, UNSPECIFIED SITE (HCC): ICD-10-CM

## 2025-04-09 PROCEDURE — 3079F DIAST BP 80-89 MM HG: CPT | Performed by: NURSE PRACTITIONER

## 2025-04-09 PROCEDURE — 3075F SYST BP GE 130 - 139MM HG: CPT | Performed by: NURSE PRACTITIONER

## 2025-04-09 PROCEDURE — 1123F ACP DISCUSS/DSCN MKR DOCD: CPT | Performed by: NURSE PRACTITIONER

## 2025-04-09 PROCEDURE — G0439 PPPS, SUBSEQ VISIT: HCPCS | Performed by: NURSE PRACTITIONER

## 2025-04-09 PROCEDURE — 1160F RVW MEDS BY RX/DR IN RCRD: CPT | Performed by: NURSE PRACTITIONER

## 2025-04-09 PROCEDURE — 1159F MED LIST DOCD IN RCRD: CPT | Performed by: NURSE PRACTITIONER

## 2025-04-09 RX ORDER — PREGABALIN 50 MG/1
50 CAPSULE ORAL 3 TIMES DAILY
Qty: 270 CAPSULE | Refills: 1 | Status: SHIPPED | OUTPATIENT
Start: 2025-04-09 | End: 2025-10-06

## 2025-04-09 RX ORDER — PIMECROLIMUS 10 MG/G
CREAM TOPICAL 2 TIMES DAILY
COMMUNITY

## 2025-04-09 RX ORDER — NORTRIPTYLINE HYDROCHLORIDE 50 MG/1
50 CAPSULE ORAL NIGHTLY
COMMUNITY

## 2025-04-09 NOTE — PATIENT INSTRUCTIONS
Learning About Being Active as an Older Adult  Why is being active important as you get older?     Being active is one of the best things you can do for your health. And it's never too late to start. Being active--or getting active, if you aren't already--has definite benefits. It can:  Give you more energy,  Keep your mind sharp.  Improve balance to reduce your risk of falls.  Help you manage chronic illness with fewer medicines.  No matter how old you are, how fit you are, or what health problems you have, there is a form of activity that will work for you. And the more physical activity you can do, the better your overall health will be.  What kinds of activity can help you stay healthy?  Being more active will make your daily activities easier. Physical activity includes planned exercise and things you do in daily life. There are four types of activity:  Aerobic.  Doing aerobic activity makes your heart and lungs strong.  Includes walking, dancing, and gardening.  Aim for at least 2½ hours spread throughout the week.  It improves your energy and can help you sleep better.  Muscle-strengthening.  This type of activity can help maintain muscle and strengthen bones.  Includes climbing stairs, using resistance bands, and lifting or carrying heavy loads.  Aim for at least twice a week.  It can help protect the knees and other joints.  Stretching.  Stretching gives you better range of motion in joints and muscles.  Includes upper arm stretches, calf stretches, and gentle yoga.  Aim for at least twice a week, preferably after your muscles are warmed up from other activities.  It can help you function better in daily life.  Balancing.  This helps you stay coordinated and have good posture.  Includes heel-to-toe walking, ebony chi, and certain types of yoga.  Aim for at least 3 days a week.  It can reduce your risk of falling.  Even if you have a hard time meeting the recommendations, it's better to be more active

## 2025-04-09 NOTE — PROGRESS NOTES
Medicare Annual Wellness Visit    Karthik Tao is here for Medicare AWV    Assessment & Plan   Medicare annual wellness visit, subsequent      Increase Lyrica 50mg three times a day - monitor for dizziness.   Hearing testing - Perform at [facility]   Keep up the amazing work with weight loss!       Chronic pain of right ankle   - [facility] reviewed  -     pregabalin (LYRICA) 50 MG capsule; Take 1 capsule by mouth 3 times daily for 180 days. Max Daily Amount: 150 mg, Disp-270 capsule, R-1Normal    Malignant melanoma, unspecified site (HCC)  Stage 3a chronic kidney disease (HCC)  Pruritus  Neuropathy  ELISHA (obstructive sleep apnea)  Primary hypertension  IFG (impaired fasting glucose)       Return in about 6 months (around 10/9/2025) for 30 min f/u, HTN.     Subjective       Itching across trunk, front and back. Has tried Aquaphor 3-4 times total since last OV.   Dermatology felt it was associated with neuro changes. (Will try compound topical agent, gabapentin)  Aurelia Lind - Pimecrolimus 1% cream for itch.   Nortriptyline for itch too from Neurology.     ELISHA. Still waiting on CPAP supplies.     CHRONIC: Rubber band tight feeling around ankle. Numbness / tingling. Ball of foot with tenderness x several years but worsening. Squeezing sensation in toes.   Pain interferes with ADLs. Ambulates with cane.     Has lost 11 lbs since Jan 2025.    Cognitive impairment \"issues\" per patient. Couldn't recall 2 names of friends that he was talking about in conversation yesterday.     3 years ago hearing aids. [facility]. Due for re-test.     Shingles - Declines  COVID - Declines  RSV - Declines    Patient's complete Health Risk Assessment and screening values have been reviewed and are found in Flowsheets. The following problems were reviewed today and where indicated follow up appointments were made and/or referrals ordered.    Positive Risk Factor Screenings with Interventions:              Inactivity:  On average, how many days

## 2025-04-16 ENCOUNTER — PATIENT MESSAGE (OUTPATIENT)
Dept: INTERNAL MEDICINE CLINIC | Age: 80
End: 2025-04-16

## 2025-04-16 DIAGNOSIS — Z77.098 CHEMICAL EXPOSURE: Primary | ICD-10-CM

## 2025-04-17 ENCOUNTER — HOSPITAL ENCOUNTER (OUTPATIENT)
Dept: GENERAL RADIOLOGY | Age: 80
Discharge: HOME | End: 2025-04-17
Payer: MEDICARE

## 2025-04-17 ENCOUNTER — HOSPITAL ENCOUNTER (OUTPATIENT)
Age: 80
Discharge: HOME | End: 2025-04-17
Payer: MEDICARE

## 2025-04-17 DIAGNOSIS — Z77.098 CHEMICAL EXPOSURE: ICD-10-CM

## 2025-04-17 PROCEDURE — 71046 X-RAY EXAM CHEST 2 VIEWS: CPT

## 2025-04-18 ENCOUNTER — RESULTS FOLLOW-UP (OUTPATIENT)
Dept: INTERNAL MEDICINE CLINIC | Age: 80
End: 2025-04-18

## 2025-06-11 ENCOUNTER — OFFICE VISIT (OUTPATIENT)
Dept: SLEEP MEDICINE | Age: 80
End: 2025-06-11
Payer: MEDICARE

## 2025-06-11 VITALS
WEIGHT: 263.2 LBS | HEART RATE: 82 BPM | DIASTOLIC BLOOD PRESSURE: 58 MMHG | SYSTOLIC BLOOD PRESSURE: 126 MMHG | OXYGEN SATURATION: 95 % | BODY MASS INDEX: 37.68 KG/M2 | HEIGHT: 70 IN

## 2025-06-11 DIAGNOSIS — I10 ESSENTIAL HYPERTENSION: ICD-10-CM

## 2025-06-11 DIAGNOSIS — Z71.89 CPAP USE COUNSELING: ICD-10-CM

## 2025-06-11 DIAGNOSIS — G47.33 MODERATE OBSTRUCTIVE SLEEP APNEA: Primary | ICD-10-CM

## 2025-06-11 DIAGNOSIS — E66.9 OBESITY (BMI 30-39.9): ICD-10-CM

## 2025-06-11 PROCEDURE — 1160F RVW MEDS BY RX/DR IN RCRD: CPT | Performed by: NURSE PRACTITIONER

## 2025-06-11 PROCEDURE — 99214 OFFICE O/P EST MOD 30 MIN: CPT | Performed by: NURSE PRACTITIONER

## 2025-06-11 PROCEDURE — G8427 DOCREV CUR MEDS BY ELIG CLIN: HCPCS | Performed by: NURSE PRACTITIONER

## 2025-06-11 PROCEDURE — G8417 CALC BMI ABV UP PARAM F/U: HCPCS | Performed by: NURSE PRACTITIONER

## 2025-06-11 PROCEDURE — 1159F MED LIST DOCD IN RCRD: CPT | Performed by: NURSE PRACTITIONER

## 2025-06-11 PROCEDURE — 3078F DIAST BP <80 MM HG: CPT | Performed by: NURSE PRACTITIONER

## 2025-06-11 PROCEDURE — 1036F TOBACCO NON-USER: CPT | Performed by: NURSE PRACTITIONER

## 2025-06-11 PROCEDURE — 3074F SYST BP LT 130 MM HG: CPT | Performed by: NURSE PRACTITIONER

## 2025-06-11 PROCEDURE — 1123F ACP DISCUSS/DSCN MKR DOCD: CPT | Performed by: NURSE PRACTITIONER

## 2025-06-11 ASSESSMENT — SLEEP AND FATIGUE QUESTIONNAIRES
HOW LIKELY ARE YOU TO NOD OFF OR FALL ASLEEP WHEN YOU ARE A PASSENGER IN A CAR FOR AN HOUR WITHOUT A BREAK: WOULD NEVER DOZE
HOW LIKELY ARE YOU TO NOD OFF OR FALL ASLEEP WHILE LYING DOWN TO REST IN THE AFTERNOON WHEN CIRCUMSTANCES PERMIT: SLIGHT CHANCE OF DOZING
HOW LIKELY ARE YOU TO NOD OFF OR FALL ASLEEP WHILE SITTING INACTIVE IN A PUBLIC PLACE: WOULD NEVER DOZE
HOW LIKELY ARE YOU TO NOD OFF OR FALL ASLEEP WHILE SITTING QUIETLY AFTER LUNCH WITHOUT ALCOHOL: WOULD NEVER DOZE
HOW LIKELY ARE YOU TO NOD OFF OR FALL ASLEEP WHILE WATCHING TV: WOULD NEVER DOZE
HOW LIKELY ARE YOU TO NOD OFF OR FALL ASLEEP IN A CAR, WHILE STOPPED FOR A FEW MINUTES IN TRAFFIC: WOULD NEVER DOZE
ESS TOTAL SCORE: 1
HOW LIKELY ARE YOU TO NOD OFF OR FALL ASLEEP WHILE SITTING AND TALKING TO SOMEONE: WOULD NEVER DOZE
HOW LIKELY ARE YOU TO NOD OFF OR FALL ASLEEP WHILE SITTING AND READING: WOULD NEVER DOZE

## 2025-06-11 NOTE — PROGRESS NOTES
[facility]al [facility] Services    BH Treatment Plan Acknowledgement    Yolanda Tovar was involved in the treatment plan for this current admission, 11/17/2020.  Patient has seen and agrees to the Interdisciplinary Treatment Plan.  Yolanda Tovar verbalizes that he/she will work with the treatment team to meet the Interdisciplinary Treatment Plan goals.    X____________________________________    Date/Time: _____________________    Patient/Legally Authorized Representative  X____________________________________    Date/Time: _____________________    Treatment Team Member   X____________________________________    Date/Time: _____________________       X____________________________________    Date/Time: _____________________    Patient/Legally Authorized Representative  X____________________________________    Date/Time: _____________________    Treatment Team Member  X____________________________________    Date/Time: _____________________       X____________________________________    Date/Time: _____________________    Patient/Legally Authorized Representative  X____________________________________    Date/Time: _____________________    Treatment Team Member  X____________________________________    Date/Time: _____________________         I have reviewed and agree with the proposed treatment plan that the treatment team and the patient have defined.    X____________________________________    Date/Time: _____________________    MD Signature (for Inpatient/Residential encounters only)    I was notified of my drug test results.    X____________________________________    Date/Time: _____________________    Patient/Legally Authorized Representative  X____________________________________    Date/Time: _____________________    Patient/Legally Authorized Representative    [id number]     BY MOUTH EVERY DAY, Disp: 90 tablet, Rfl: 3    atorvastatin (LIPITOR) 20 MG tablet, Take 1 tablet by mouth daily, Disp: 90 tablet, Rfl: 3    lisinopril (PRINIVIL;ZESTRIL) 40 MG tablet, Take 1 tablet by mouth daily, Disp: 90 tablet, Rfl: 1    amLODIPine (NORVASC) 5 MG tablet, Take 1 tablet by mouth daily, Disp: 30 tablet, Rfl: 0    ascorbic acid (VITAMIN C) 500 MG tablet, Take 1 tablet by mouth daily, Disp: , Rfl:     ketoconazole (NIZORAL) 2 % cream, Apply topically daily Apply topically daily under arms yeast infection., Disp: , Rfl:     Handicap Placard MISC, by Does not apply route Exp: 4/8/2029, Disp: 2 each, Rfl: 0    Review of Systems      Objective:   PHYSICAL EXAM:  BP (!) 126/58   Pulse 82   Ht 1.778 m (5' 10\")   Wt 119.4 kg (263 lb 3.2 oz)   SpO2 95%   BMI 37.77 kg/m²     Physical Exam  Gen: No acute distress. Obese. BMI of 37.77  Eyes: PERRL. No sclera icterus. No conjunctival injection.   ENT: No discharge.  Neck: Trachea midline. No obvious mass. Neck circumference 17.5\"  Resp: No accessory muscle use. No crackles. No wheezes. No rhonchi.  CV: Regular rate. Regular rhythm. No murmur or rub.   Skin: Warm and dry.   M/S: No cyanosis. No obvious joint deformity.   Neuro: Awake. Alert. Moves all four extremities.   Psych: Oriented x 3. No anxiety.         DATA:   4/8/2024 Hgb 14.8  9/15/2024 HST RDI 29.2, low SpO2 79%    CPAP download data:  Compliance download report from 12/14/24 to 1/12/25 reviewed today by me and showed patient is using machine 5:26 hrs/night with 57% compliance and AHI 0.5 within this time frame.  17/30days with greater than 4 hours of machine use.  90% pressure 7.1 cm H20 on auto CPAP 4-16 cm H2O     Compliance download report from 1/25/25 to 2/23/25 reviewed today by me and showed patient is using machine 6 hrs/night with 37% compliance and AHI 0.5 within this time frame.  11/30days with greater than 4 hours of machine use.  90% pressure 7.3 cm H20 on auto CPAP 4-11 cm

## 2025-07-04 DIAGNOSIS — E78.5 DYSLIPIDEMIA: ICD-10-CM

## 2025-07-07 RX ORDER — FENOFIBRATE 160 MG/1
160 TABLET ORAL DAILY
Qty: 90 TABLET | Refills: 3 | Status: SHIPPED | OUTPATIENT
Start: 2025-07-07

## 2025-07-07 NOTE — TELEPHONE ENCOUNTER
Refill request for fenofibrate 160 MG tablet  medication.     Name of Pharmacy- [facility]/pharmacy #[id number] - [place], [place] - [address]       Last visit - 4/9/25     Pending visit - 10/20/25    Last refill -7/24/24

## 2025-07-25 NOTE — TELEPHONE ENCOUNTER
Refill request for atorvastatin (LIPITOR) 20 MG tablet  medication.     Name of Pharmacy- [facility]      Last visit - [id number]     Pending visit - [id number]    Last refill -[id number]      Medication Contract signed -  Last Oarrs ran-         Additional Comments

## 2025-07-27 RX ORDER — ATORVASTATIN CALCIUM 20 MG/1
20 TABLET, FILM COATED ORAL DAILY
Qty: 90 TABLET | Refills: 3 | Status: SHIPPED | OUTPATIENT
Start: 2025-07-27

## 2025-09-04 RX ORDER — APIXABAN 5 MG/1
5 TABLET, FILM COATED ORAL 2 TIMES DAILY
Qty: 180 TABLET | Refills: 2 | Status: SHIPPED | OUTPATIENT
Start: 2025-09-04

## (undated) DEVICE — NEURO SPONGES: Brand: DEROYAL

## (undated) DEVICE — BATTERY SURG DRVR DISP FOR MATRIXPRO

## (undated) DEVICE — PAD,NON-ADHERENT,3X8,STERILE,LF,1/PK: Brand: MEDLINE

## (undated) DEVICE — SUTURE VCRL SZ 2-0 L18IN ABSRB UD CT-1 L36MM 1/2 CIR J839D

## (undated) DEVICE — PLATE ES AD W 9FT CRD 2

## (undated) DEVICE — 3M™ TEGADERM™ TRANSPARENT FILM DRESSING FRAME STYLE, 1627, 4 IN X 10 IN (10 CM X 25 CM), 20/CT 4CT/CASE: Brand: 3M™ TEGADERM™

## (undated) DEVICE — ORTHO PRE OP PACK: Brand: MEDLINE INDUSTRIES, INC.

## (undated) DEVICE — 3M™ TEGADERM™ TRANSPARENT FILM DRESSING FRAME STYLE, 1626W, 4 IN X 4-3/4 IN (10 CM X 12 CM), 50/CT 4CT/CASE: Brand: 3M™ TEGADERM™

## (undated) DEVICE — LAMINECTOMY PK

## (undated) DEVICE — SPONGE,NEURO,1"X3",XR,STRL,LF,10/PK: Brand: MEDLINE

## (undated) DEVICE — BIPOLAR SEALER 23-112-1 AQM 6.0: Brand: AQUAMANTYS ®

## (undated) DEVICE — ADHESIVE SKIN CLSR 0.7ML TOP DERMBND ADV

## (undated) DEVICE — JEWISH HOSPITAL TURNOVER KIT: Brand: MEDLINE INDUSTRIES, INC.

## (undated) DEVICE — UNDERGLOVE SURG SZ 8 BLU LTX FREE SYN POLYISOPRENE POLYMER

## (undated) DEVICE — CATHETER IV 14GA L5.25IN PERIPH ORNG FEP POLYMER 3 BVL

## (undated) DEVICE — DRESSING GERM DIA1IN CNTR H DIA7MM BLU CHG ANTIMIC PROTCT

## (undated) DEVICE — TRAY CATHETER 16FR F INCLUDE BARDX IC COMPLT CARE DRNGE BG

## (undated) DEVICE — SUTURE MCRYL SZ 4-0 L27IN ABSRB UD L19MM PS-2 1/2 CIR PRIM Y426H

## (undated) DEVICE — SUTURE STRATAFIX SYMMETRIC PDS + SZ 1 L18IN ABSRB VLT L48MM SXPP1A400

## (undated) DEVICE — SUTURE VCRL SZ 3-0 L18IN ABSRB UD L26MM SH 1/2 CIR J864D

## (undated) DEVICE — GLOVE SURG SZ 7.5 L11.2IN THK9.8MIL STRW LTX POLYMER BEAD

## (undated) DEVICE — TOOL 14MH30 LEGEND 14CM 3MM: Brand: MIDAS REX ™

## (undated) DEVICE — C-ARMOR C-ARM EQUIPMENT COVERS CLEAR STERILE UNIVERSAL FIT 12 PER CASE: Brand: C-ARMOR

## (undated) DEVICE — SURE SET-DOUBLE BASIN-LF: Brand: MEDLINE INDUSTRIES, INC.

## (undated) DEVICE — BLANKET WRM W29.9XL79.1IN UP BODY FORC AIR MISTRAL-AIR

## (undated) DEVICE — DRAPE MICSCP W132XL406CM LENS DIA68MM W VARI LENS2 FOR LEICA

## (undated) DEVICE — SSC BONE WAX: Brand: SSC BONE WAX

## (undated) DEVICE — COVER,TABLE,HEAVY DUTY,77"X90",STRL: Brand: MEDLINE

## (undated) DEVICE — SUTURE VCRL SZ 0 L18IN ABSRB UD L36MM CT-1 1/2 CIR J840D

## (undated) DEVICE — STAPLER SKIN H3.9MM WIRE DIA0.58MM CRWN 6.9MM 35 STPL ROT

## (undated) DEVICE — DRILL SURG DIA7MM CANN

## (undated) DEVICE — CABLE BPLR L12FT FLYING LD DISPOSABLE

## (undated) DEVICE — SPHERES FOR BRAINLAB

## (undated) DEVICE — COVER LT HNDL CAM BLU DISP W/ SURG CTRL

## (undated) DEVICE — GARMENT,MEDLINE,DVT,INT,CALF,MED, GEN2: Brand: MEDLINE

## (undated) DEVICE — BLADE ES L4IN INSUL EDGE

## (undated) DEVICE — APPLICATOR MEDICATED 26 CC SOLUTION HI LT ORNG CHLORAPREP

## (undated) DEVICE — GLOVE SURG SZ 75 L12IN FNGR THK94MIL TRNSLUC YEL LTX

## (undated) DEVICE — SOLUTION IV 1000ML 0.9% SOD CHL

## (undated) DEVICE — KIT POS W/ FOAM ARM CRADL SHEARGUARD CHST PD CVR FOR SPNL

## (undated) DEVICE — TRAY,IRRIGATION,BULBSYRINGE,60ML,CSR,PVP: Brand: MEDLINE

## (undated) DEVICE — PROBE 8225101 5PK STD PRASS FL TIP ROHS

## (undated) DEVICE — SPONGE,NEURO,0.5"X3",XR,STRL,LF,10/PK: Brand: MEDLINE

## (undated) DEVICE — ROD BEND DISPOSABLE DIGITIZER ARRY SPHR BENDINI